# Patient Record
Sex: MALE | Race: BLACK OR AFRICAN AMERICAN | Employment: UNEMPLOYED | URBAN - METROPOLITAN AREA
[De-identification: names, ages, dates, MRNs, and addresses within clinical notes are randomized per-mention and may not be internally consistent; named-entity substitution may affect disease eponyms.]

---

## 2021-05-26 ENCOUNTER — HOSPITAL ENCOUNTER (EMERGENCY)
Age: 61
Discharge: HOME | End: 2021-05-26
Attending: EMERGENCY MEDICINE
Payer: OTHER MISCELLANEOUS

## 2021-05-26 VITALS
TEMPERATURE: 97.5 F | WEIGHT: 200 LBS | SYSTOLIC BLOOD PRESSURE: 153 MMHG | HEIGHT: 73 IN | DIASTOLIC BLOOD PRESSURE: 92 MMHG | HEART RATE: 77 BPM | RESPIRATION RATE: 16 BRPM | OXYGEN SATURATION: 98 % | BODY MASS INDEX: 26.51 KG/M2

## 2021-05-26 DIAGNOSIS — S61.411A LACERATION OF RIGHT HAND WITHOUT FOREIGN BODY, INITIAL ENCOUNTER: Primary | ICD-10-CM

## 2021-05-26 PROCEDURE — 74011250637 HC RX REV CODE- 250/637: Performed by: EMERGENCY MEDICINE

## 2021-05-26 PROCEDURE — 74011000250 HC RX REV CODE- 250: Performed by: EMERGENCY MEDICINE

## 2021-05-26 PROCEDURE — 75810000293 HC SIMP/SUPERF WND  RPR

## 2021-05-26 PROCEDURE — 99283 EMERGENCY DEPT VISIT LOW MDM: CPT

## 2021-05-26 RX ORDER — CEPHALEXIN 250 MG/1
500 CAPSULE ORAL
Status: COMPLETED | OUTPATIENT
Start: 2021-05-26 | End: 2021-05-26

## 2021-05-26 RX ORDER — LIDOCAINE HYDROCHLORIDE AND EPINEPHRINE 10; 10 MG/ML; UG/ML
10 INJECTION, SOLUTION INFILTRATION; PERINEURAL ONCE
Status: COMPLETED | OUTPATIENT
Start: 2021-05-26 | End: 2021-05-26

## 2021-05-26 RX ADMIN — LIDOCAINE HYDROCHLORIDE,EPINEPHRINE BITARTRATE 100 MG: 10; .01 INJECTION, SOLUTION INFILTRATION; PERINEURAL at 05:30

## 2021-05-26 RX ADMIN — CEPHALEXIN 500 MG: 250 CAPSULE ORAL at 06:30

## 2021-05-26 NOTE — ED TRIAGE NOTES
Pt had a cutting blade in his pocket and accidentally hit it with his right hand causing 2 inch laceration to top of hand near thumb.  No bleeding at present

## 2021-06-05 NOTE — ED PROVIDER NOTES
EMERGENCY DEPARTMENT HISTORY AND PHYSICAL EXAM    Date: 5/26/2021  Patient Name: Be Rico    History of Presenting Illness     Chief Complaint   Patient presents with    Laceration         History Provided By: Patient    Additional History (Context):   4:25 AM  Be Rico is a 57 y.o. male with PMHX of hypertension, diabetes who presents to the emergency department C/O right hand laceration. Patient had a laceration across his a.m. after accidentally cutting it with a pocket knife. The blade was clean and he was not using it to clean fish or meat or other dirty material.. He states his tetanus shots are up-to-date. Social History  Denies smoking drinking or drugs    Family History  Positive for high blood pressure and diabetes    PCP: Oni Jerome MD    Current Outpatient Medications   Medication Sig Dispense Refill    ramipril (ALTACE) 2.5 mg capsule Take 1 Cap by mouth daily. 30 Cap 3    omeprazole (PRILOSEC) 20 mg capsule Take 1 Cap by mouth daily. 30 Cap 3    Insulin Syringes, Disposable, 1 mL syrg As directed 50 Syringe 3    insulin glargine (LANTUS) 100 unit/mL injection 25 units sq qday 1 Vial 1    insulin lispro (HUMALOG) 100 unit/mL injection 8 units tid 1 Vial 3    aspirin 81 mg chewable tablet Take 81 mg by mouth daily.  amLODIPine (NORVASC) 10 mg tablet Take 10 mg by mouth daily. Indications: HYPERTENSION      metoprolol (LOPRESSOR) 50 mg tablet Take 50 mg by mouth two (2) times a day. Indications: HYPERTENSION      pravastatin (PRAVACHOL) 40 mg tablet Take 40 mg by mouth nightly. Indications: HYPERCHOLESTEROLEMIA         Past History     Past Medical History:  Past Medical History:   Diagnosis Date    Anginal pain ([facility] [address]     Diabetes ([facility] [address]     new onset 2/2014    High cholesterol        Past Surgical History:  No past surgical history on file. Family History:  No family history on file.     Social History:  Social History     Tobacco Use    Smoking status: Current Some Day Smoker   Substance Use Topics    Alcohol use: No     Comment: former drinker, quit 12/2013    Drug use: No       Allergies:  No Known Allergies      Review of Systems   Review of Systems   Constitutional: Negative. Cardiovascular: Negative. Gastrointestinal: Negative. Musculoskeletal: Negative. Skin: Positive for wound. Negative for color change. Neurological: Positive for numbness. Diabetic peripheral neuropathy. Hematological: Does not bruise/bleed easily. Psychiatric/Behavioral: Negative for agitation and self-injury. The patient is not nervous/anxious. All other systems reviewed and are negative. Physical Exam     Vitals:    05/26/21 0337   BP: (!) 153/92   Pulse: 77   Resp: 16   Temp: 97.5 °F (36.4 °C)   SpO2: 98%   Weight: 90.7 kg (200 lb)   Height: 6' 1\" (1.854 m)     Physical Exam  Vitals and nursing note reviewed. Constitutional:       General: He is not in acute distress. Appearance: He is well-developed. He is not diaphoretic. HENT:      Head: Normocephalic and atraumatic. Eyes:      General: No scleral icterus. Extraocular Movements:      Right eye: Normal extraocular motion. Left eye: Normal extraocular motion. Conjunctiva/sclera: Conjunctivae normal.      Pupils: Pupils are equal, round, and reactive to light. Neck:      Trachea: No tracheal deviation. Pulmonary:      Effort: Pulmonary effort is normal. No respiratory distress. Breath sounds: No stridor. Abdominal:      General: Bowel sounds are normal. There is no distension. Palpations: Abdomen is soft. Tenderness: There is no abdominal tenderness. There is no rebound. Musculoskeletal:         General: No tenderness. Normal range of motion. Hands:       Cervical back: Normal range of motion and neck supple. Comments: 5.5 cm laceration dorsum of the right hand. Bleeding is spontaneously controlled.   Grossly unremarkable without abnormalities   Skin:     General: Skin is warm and dry. Capillary Refill: Capillary refill takes less than 2 seconds. Findings: Laceration and wound present. No rash. Comments: 5.5 cm laceration of the dorsum of the posterior aspect of the thumb on the right hand. He has full opposition of the thumb in full extension. IPJ and MCP function are completely intact. Sensation remains intact the distal phalanges of the thumb. Neurological:      Mental Status: He is alert and oriented to person, place, and time. GCS: GCS eye subscore is 4. GCS verbal subscore is 5. GCS motor subscore is 6. Cranial Nerves: No cranial nerve deficit. Motor: No weakness. Psychiatric:         Mood and Affect: Mood normal.         Behavior: Behavior normal.         Thought Content: Thought content normal.         Judgment: Judgment normal.       Diagnostic Study Results     Labs -  No results found for this or any previous visit (from the past 24 hour(s)). Radiologic Studies -   No orders to display     CT Results  (Last 48 hours)    None        CXR Results  (Last 48 hours)    None          Medications given in the ED-  Medications   lidocaine-EPINEPHrine (XYLOCAINE) 1 %-1:100,000 injection 100 mg (100 mg IntraDERMal Given by Provider 5/26/21 0530)   cephALEXin (KEFLEX) capsule 500 mg (500 mg Oral Given 5/26/21 0630)         Medical Decision Making   I am the first provider for this patient. I reviewed the vital signs, available nursing notes, past medical history, past surgical history, family history and social history. Vital Signs-Reviewed the patient's vital signs. Pulse Oximetry Analysis - 98% on room air      Records Reviewed: NURSING NOTES AND PREVIOUS MEDICAL RECORDS    Provider Notes (Medical Decision Making):   Patient with isolated hand laceration postoperative pain. There is no evidence of foreign body by examination is clearly evident.   We discussed the possibility of x-ray but neither of us feel this is indicated. Wound was cleaned and irrigated and cleansed easily after nerve block and minimal local infiltrate. Patient was instructed in local wound care and asked to return in 10 to 14 days for suture removal.    Procedures:  Wound Repair    Date/Time: 5/26/2021 5:15 AM  Performed by: attendingPreparation: skin prepped with Betadine  Pre-procedure re-eval: Immediately prior to the procedure, the patient was reevaluated and found suitable for the planned procedure and any planned medications. Time out: Immediately prior to the procedure a time out was called to verify the correct patient, procedure, equipment, staff and marking as appropriate. .  Wound length:2.6 - 7.5 cm (5.5 cm)  Anesthesia: nerve block and local infiltration    Anesthesia:  Local Anesthetic: lidocaine 1% with epinephrine  Anesthetic total: 8 mL  Foreign bodies: no foreign bodies  Irrigation solution: saline  Irrigation method: jet lavage  Debridement: none  Skin closure: 4-0 nylon  Subcutaneous closure: Vicryl  Number of sutures: 12  Technique: simple and interrupted  Approximation: close  Dressing: 4x4, antibiotic ointment and non-adhesive packing strip  My total time at bedside, performing this procedure was 16-30 minutes.   Nerve Block    Date/Time: 5/26/2021 5:10 AM  Performed by: Jenney Rubinstein, MD  Authorized by: Jenney Rubinstein, MD     Consent:     Consent obtained:  Verbal    Consent given by:  Patient    Risks discussed:  Bleeding, allergic reaction, infection, intravenous injection, pain, nerve damage, swelling and unsuccessful block    Alternatives discussed:  Delayed treatment  Indications:     Indications:  Pain relief and procedural anesthesia  Location:     Body area:  Upper extremity    Upper extremity nerve:  Radial    Laterality:  Right  Pre-procedure details:     Skin preparation:  Hibiclens  Skin anesthesia (see MAR for exact dosages):     Skin anesthesia method:  None  Procedure details (see MAR for exact dosages): Block needle gauge:  27 G    Anesthetic injected:  Lidocaine 1% WITH epi    Steroid injected:  None    Additive injected:  None    Injection procedure:  Anatomic landmarks identified, anatomic landmarks palpated, incremental injection and negative aspiration for blood    Paresthesia:  None  Post-procedure details:     Dressing:  None    Outcome:  Anesthesia achieved    Patient tolerance of procedure: Tolerated well, no immediate complications        ED Course:   2:01 PM: Initial assessment performed. The patients presenting problems have been discussed, and they are in agreement with the care plan formulated and outlined with them. I have encouraged them to ask questions as they arise throughout their visit. Diagnosis and Disposition       DISCHARGE NOTE:  6:19 AM  Jia Yeboah's  results have been reviewed with him. He has been counseled regarding his diagnosis, treatment, and plan. He verbally conveys understanding and agreement of the signs, symptoms, diagnosis, treatment and prognosis and additionally agrees to follow up as discussed. He also agrees with the care-plan and conveys that all of his questions have been answered. I have also provided discharge instructions for him that include: educational information regarding their diagnosis and treatment, and list of reasons why they would want to return to the ED prior to their follow-up appointment, should his condition change. He has been provided with education for proper emergency department utilization. CLINICAL IMPRESSION:    1. Laceration of right hand without foreign body, initial encounter        PLAN:  1. D/C Home  2. Discharge Medication List as of 5/26/2021  6:22 AM        3.    Follow-up Information     Follow up With Specialties Details Why Contact Info    Lesa Thomas MD Family Medicine In 1 week  [address] [place] [address] EMERGENCY DEPT Emergency Medicine In 10 days For suture removal [address] [place] [address]  [phone]        _______________________________    This note was partially transcribed via voice recognition software. Although efforts have been made to catch any discrepancies, it may contain sound alike words, grammatical errors, or nonsensical words.

## 2023-01-15 ENCOUNTER — HOSPITAL ENCOUNTER (INPATIENT)
Age: 63
LOS: 6 days | Discharge: HOME | DRG: 235 | End: 2023-01-21
Attending: HOSPITALIST | Admitting: HOSPITALIST
Payer: OTHER GOVERNMENT

## 2023-01-15 ENCOUNTER — HOSPITAL ENCOUNTER (INPATIENT)
Age: 63
LOS: 1 days | Discharge: ANOTHER HOSPITAL | DRG: 282 | End: 2023-01-15
Attending: EMERGENCY MEDICINE | Admitting: FAMILY MEDICINE
Payer: OTHER GOVERNMENT

## 2023-01-15 ENCOUNTER — APPOINTMENT (OUTPATIENT)
Dept: VASCULAR SURGERY | Age: 63
DRG: 235 | End: 2023-01-15
Attending: PHYSICIAN ASSISTANT
Payer: OTHER GOVERNMENT

## 2023-01-15 ENCOUNTER — APPOINTMENT (OUTPATIENT)
Dept: GENERAL RADIOLOGY | Age: 63
DRG: 282 | End: 2023-01-15
Attending: HOSPITALIST
Payer: OTHER GOVERNMENT

## 2023-01-15 ENCOUNTER — HOSPITAL ENCOUNTER (INPATIENT)
Dept: CT IMAGING | Age: 63
Discharge: HOME | DRG: 235 | End: 2023-01-15
Attending: PHYSICIAN ASSISTANT
Payer: OTHER GOVERNMENT

## 2023-01-15 ENCOUNTER — APPOINTMENT (OUTPATIENT)
Dept: NON INVASIVE DIAGNOSTICS | Age: 63
DRG: 282 | End: 2023-01-15
Attending: HOSPITALIST
Payer: OTHER GOVERNMENT

## 2023-01-15 VITALS
HEIGHT: 73 IN | RESPIRATION RATE: 13 BRPM | WEIGHT: 195 LBS | BODY MASS INDEX: 25.84 KG/M2 | DIASTOLIC BLOOD PRESSURE: 88 MMHG | SYSTOLIC BLOOD PRESSURE: 162 MMHG | OXYGEN SATURATION: 100 % | HEART RATE: 80 BPM | TEMPERATURE: 98 F

## 2023-01-15 DIAGNOSIS — I21.3 STEMI (ST ELEVATION MYOCARDIAL INFARCTION) (HCC): ICD-10-CM

## 2023-01-15 DIAGNOSIS — I21.11 ST ELEVATION MYOCARDIAL INFARCTION INVOLVING RIGHT CORONARY ARTERY (HCC): Primary | ICD-10-CM

## 2023-01-15 DIAGNOSIS — I25.110 CORONARY ARTERY DISEASE INVOLVING NATIVE CORONARY ARTERY OF NATIVE HEART WITH UNSTABLE ANGINA PECTORIS (HCC): ICD-10-CM

## 2023-01-15 DIAGNOSIS — Z95.1 S/P CABG (CORONARY ARTERY BYPASS GRAFT): ICD-10-CM

## 2023-01-15 DIAGNOSIS — I21.3 ST ELEVATION MYOCARDIAL INFARCTION (STEMI), UNSPECIFIED ARTERY (HCC): Primary | ICD-10-CM

## 2023-01-15 LAB
ACT BLD: 474 SECS (ref 79–138)
ALBUMIN SERPL-MCNC: 4 G/DL (ref 3.4–5)
ALBUMIN/GLOB SERPL: 1.1 (ref 0.8–1.7)
ALP SERPL-CCNC: 109 U/L (ref 45–117)
ALT SERPL-CCNC: 129 U/L (ref 16–61)
AMMONIA PLAS-SCNC: 41 UMOL/L (ref 11–32)
ANION GAP SERPL CALC-SCNC: 14 MMOL/L (ref 3–18)
APTT PPP: 26.3 SEC (ref 23–36.4)
APTT PPP: 43 SEC (ref 23–36.4)
APTT PPP: 56.1 SEC (ref 23–36.4)
AST SERPL-CCNC: 74 U/L (ref 10–38)
ATRIAL RATE: 55 BPM
BASE EXCESS BLD CALC-SCNC: 1.2 MMOL/L
BASOPHILS # BLD: 0 K/UL (ref 0–0.1)
BASOPHILS # BLD: 0 K/UL (ref 0–0.1)
BASOPHILS NFR BLD: 0 % (ref 0–2)
BASOPHILS NFR BLD: 1 % (ref 0–2)
BDY SITE: ABNORMAL
BILIRUB SERPL-MCNC: 0.3 MG/DL (ref 0.2–1)
BODY TEMPERATURE: 98
BUN SERPL-MCNC: 4 MG/DL (ref 7–18)
BUN/CREAT SERPL: 6 (ref 12–20)
CALCIUM SERPL-MCNC: 10 MG/DL (ref 8.5–10.1)
CALCULATED P AXIS, ECG09: 34 DEGREES
CALCULATED R AXIS, ECG10: 43 DEGREES
CALCULATED T AXIS, ECG11: 83 DEGREES
CHLORIDE SERPL-SCNC: 92 MMOL/L (ref 100–111)
CO2 SERPL-SCNC: 23 MMOL/L (ref 21–32)
COVID-19 RAPID TEST, COVR: NOT DETECTED
CREAT SERPL-MCNC: 0.72 MG/DL (ref 0.6–1.3)
DIAGNOSIS, 93000: NORMAL
DIFFERENTIAL METHOD BLD: ABNORMAL
DIFFERENTIAL METHOD BLD: ABNORMAL
EOSINOPHIL # BLD: 0 K/UL (ref 0–0.4)
EOSINOPHIL # BLD: 0.3 K/UL (ref 0–0.4)
EOSINOPHIL NFR BLD: 1 % (ref 0–5)
EOSINOPHIL NFR BLD: 3 % (ref 0–5)
ERYTHROCYTE [DISTWIDTH] IN BLOOD BY AUTOMATED COUNT: 12.3 % (ref 11.6–14.5)
ERYTHROCYTE [DISTWIDTH] IN BLOOD BY AUTOMATED COUNT: 12.4 % (ref 11.6–14.5)
EST. AVERAGE GLUCOSE BLD GHB EST-MCNC: 114 MG/DL
GAS FLOW.O2 O2 DELIVERY SYS: ABNORMAL
GLOBULIN SER CALC-MCNC: 3.8 G/DL (ref 2–4)
GLUCOSE BLD STRIP.AUTO-MCNC: 111 MG/DL (ref 70–110)
GLUCOSE BLD STRIP.AUTO-MCNC: 126 MG/DL (ref 70–110)
GLUCOSE SERPL-MCNC: 128 MG/DL (ref 74–99)
HBA1C MFR BLD: 5.6 % (ref 4.2–5.6)
HCO3 BLD-SCNC: 24.8 MMOL/L (ref 22–26)
HCT VFR BLD AUTO: 43.6 % (ref 36–48)
HCT VFR BLD AUTO: 44.4 % (ref 36–48)
HGB BLD-MCNC: 15.9 G/DL (ref 13–16)
HGB BLD-MCNC: 16.3 G/DL (ref 13–16)
HISTORY CHECKED?,CKHIST: NORMAL
IMM GRANULOCYTES # BLD AUTO: 0 K/UL (ref 0–0.04)
IMM GRANULOCYTES # BLD AUTO: 0 K/UL (ref 0–0.04)
IMM GRANULOCYTES NFR BLD AUTO: 0 % (ref 0–0.5)
IMM GRANULOCYTES NFR BLD AUTO: 1 % (ref 0–0.5)
INR PPP: 0.9 (ref 0.8–1.2)
LEFT BULB EDV: 11.9 CM/S
LEFT BULB PSV: 52 CM/S
LEFT CCA DIST DIAS: 11 CM/S
LEFT CCA DIST SYS: 62.5 CM/S
LEFT CCA MID DIAS: 15.57 CM/S
LEFT CCA MID SYS: 90.47 CM/S
LEFT CCA PROX DIAS: 19.5 CM/S
LEFT CCA PROX SYS: 124 CM/S
LEFT ECA DIAS: 10.9 CM/S
LEFT ECA SYS: 73.6 CM/S
LEFT ICA DIST DIAS: 20.2 CM/S
LEFT ICA DIST SYS: 69.5 CM/S
LEFT ICA MID DIAS: 21.2 CM/S
LEFT ICA MID SYS: 64.4 CM/S
LEFT ICA PROX DIAS: 20.2 CM/S
LEFT ICA PROX SYS: 76.7 CM/S
LEFT ICA/CCA SYS: 1.23 NO UNITS
LEFT VERTEBRAL DIAS: 15.01 CM/S
LEFT VERTEBRAL SYS: 82.3 CM/S
LYMPHOCYTES # BLD: 1.4 K/UL (ref 0.9–3.6)
LYMPHOCYTES # BLD: 4.3 K/UL (ref 0.9–3.6)
LYMPHOCYTES NFR BLD: 17 % (ref 21–52)
LYMPHOCYTES NFR BLD: 51 % (ref 21–52)
MCH RBC QN AUTO: 32.6 PG (ref 24–34)
MCH RBC QN AUTO: 32.7 PG (ref 24–34)
MCHC RBC AUTO-ENTMCNC: 36.5 G/DL (ref 31–37)
MCHC RBC AUTO-ENTMCNC: 36.7 G/DL (ref 31–37)
MCV RBC AUTO: 89.2 FL (ref 78–100)
MCV RBC AUTO: 89.5 FL (ref 78–100)
MONOCYTES # BLD: 0.5 K/UL (ref 0.05–1.2)
MONOCYTES # BLD: 0.9 K/UL (ref 0.05–1.2)
MONOCYTES NFR BLD: 10 % (ref 3–10)
MONOCYTES NFR BLD: 6 % (ref 3–10)
NEUTS SEG # BLD: 2.9 K/UL (ref 1.8–8)
NEUTS SEG # BLD: 6.3 K/UL (ref 1.8–8)
NEUTS SEG NFR BLD: 35 % (ref 40–73)
NEUTS SEG NFR BLD: 76 % (ref 40–73)
NRBC # BLD: 0 K/UL (ref 0–0.01)
NRBC # BLD: 0 K/UL (ref 0–0.01)
NRBC BLD-RTO: 0 PER 100 WBC
NRBC BLD-RTO: 0 PER 100 WBC
P-R INTERVAL, ECG05: 242 MS
PCO2 BLD: 35 MMHG (ref 35–45)
PH BLD: 7.46 (ref 7.35–7.45)
PLATELET # BLD AUTO: 244 K/UL (ref 135–420)
PLATELET # BLD AUTO: 254 K/UL (ref 135–420)
PMV BLD AUTO: 8.4 FL (ref 9.2–11.8)
PMV BLD AUTO: 8.6 FL (ref 9.2–11.8)
PO2 BLD: 81 MMHG (ref 80–100)
POTASSIUM SERPL-SCNC: 3.8 MMOL/L (ref 3.5–5.5)
PROT SERPL-MCNC: 7.8 G/DL (ref 6.4–8.2)
PROTHROMBIN TIME: 12.6 SEC (ref 11.5–15.2)
Q-T INTERVAL, ECG07: 376 MS
QRS DURATION, ECG06: 90 MS
QTC CALCULATION (BEZET), ECG08: 359 MS
RBC # BLD AUTO: 4.87 M/UL (ref 4.35–5.65)
RBC # BLD AUTO: 4.98 M/UL (ref 4.35–5.65)
RIGHT BULB EDV: 7.6 CM/S
RIGHT BULB PSV: 51.6 CM/S
RIGHT CCA DIST DIAS: 8.9 CM/S
RIGHT CCA DIST SYS: 62 CM/S
RIGHT CCA MID DIAS: 13.6 CM/S
RIGHT CCA MID SYS: 98.35 CM/S
RIGHT CCA PROX DIAS: 9.7 CM/S
RIGHT CCA PROX SYS: 122 CM/S
RIGHT ECA DIAS: 8.93 CM/S
RIGHT ECA SYS: 95.6 CM/S
RIGHT ICA DIST DIAS: 24.8 CM/S
RIGHT ICA DIST SYS: 88.6 CM/S
RIGHT ICA MID DIAS: 23.7 CM/S
RIGHT ICA MID SYS: 69.9 CM/S
RIGHT ICA PROX DIAS: 14.7 CM/S
RIGHT ICA PROX SYS: 52.3 CM/S
RIGHT ICA/CCA SYS: 1.4 NO UNITS
RIGHT VERTEBRAL DIAS: 9.45 CM/S
RIGHT VERTEBRAL SYS: 50.1 CM/S
SAO2 % BLD: 96.7 % (ref 92–97)
SERVICE CMNT-IMP: ABNORMAL
SODIUM SERPL-SCNC: 129 MMOL/L (ref 136–145)
SOURCE, COVRS: NORMAL
SPECIMEN TYPE: ABNORMAL
TOTAL RESP. RATE, ITRR: 15
TROPONIN-HIGH SENSITIVITY: 24 NG/L (ref 0–78)
TROPONIN-HIGH SENSITIVITY: 3096 NG/L (ref 0–78)
VAS LEFT SUBCLAVIAN PROX EDV: 11.7 CM/S
VAS LEFT SUBCLAVIAN PROX PSV: 137.1 CM/S
VAS RIGHT SUBCLAVIAN PROX EDV: 0 CM/S
VAS RIGHT SUBCLAVIAN PROX PSV: 121.2 CM/S
VENTRICULAR RATE, ECG03: 55 BPM
WBC # BLD AUTO: 8.3 K/UL (ref 4.6–13.2)
WBC # BLD AUTO: 8.4 K/UL (ref 4.6–13.2)

## 2023-01-15 PROCEDURE — C9113 INJ PANTOPRAZOLE SODIUM, VIA: HCPCS | Performed by: INTERNAL MEDICINE

## 2023-01-15 PROCEDURE — 85730 THROMBOPLASTIN TIME PARTIAL: CPT

## 2023-01-15 PROCEDURE — 80307 DRUG TEST PRSMV CHEM ANLYZR: CPT

## 2023-01-15 PROCEDURE — 74011000258 HC RX REV CODE- 258: Performed by: INTERNAL MEDICINE

## 2023-01-15 PROCEDURE — 87635 SARS-COV-2 COVID-19 AMP PRB: CPT

## 2023-01-15 PROCEDURE — C1769 GUIDE WIRE: HCPCS | Performed by: INTERNAL MEDICINE

## 2023-01-15 PROCEDURE — 84484 ASSAY OF TROPONIN QUANT: CPT

## 2023-01-15 PROCEDURE — 77010033678 HC OXYGEN DAILY

## 2023-01-15 PROCEDURE — 94762 N-INVAS EAR/PLS OXIMTRY CONT: CPT

## 2023-01-15 PROCEDURE — 96375 TX/PRO/DX INJ NEW DRUG ADDON: CPT

## 2023-01-15 PROCEDURE — C1725 CATH, TRANSLUMIN NON-LASER: HCPCS | Performed by: INTERNAL MEDICINE

## 2023-01-15 PROCEDURE — 74011000250 HC RX REV CODE- 250: Performed by: HOSPITALIST

## 2023-01-15 PROCEDURE — 86900 BLOOD TYPING SEROLOGIC ABO: CPT

## 2023-01-15 PROCEDURE — 65620000000 HC RM CCU GENERAL

## 2023-01-15 PROCEDURE — 36415 COLL VENOUS BLD VENIPUNCTURE: CPT

## 2023-01-15 PROCEDURE — 36600 WITHDRAWAL OF ARTERIAL BLOOD: CPT

## 2023-01-15 PROCEDURE — 74011000636 HC RX REV CODE- 636: Performed by: INTERNAL MEDICINE

## 2023-01-15 PROCEDURE — 93880 EXTRACRANIAL BILAT STUDY: CPT

## 2023-01-15 PROCEDURE — 96374 THER/PROPH/DIAG INJ IV PUSH: CPT

## 2023-01-15 PROCEDURE — 65610000006 HC RM INTENSIVE CARE

## 2023-01-15 PROCEDURE — B2111ZZ FLUOROSCOPY OF MULTIPLE CORONARY ARTERIES USING LOW OSMOLAR CONTRAST: ICD-10-PCS | Performed by: INTERNAL MEDICINE

## 2023-01-15 PROCEDURE — 85025 COMPLETE CBC W/AUTO DIFF WBC: CPT

## 2023-01-15 PROCEDURE — 80053 COMPREHEN METABOLIC PANEL: CPT

## 2023-01-15 PROCEDURE — 77030010221 HC SPLNT WR POS TELE -B: Performed by: INTERNAL MEDICINE

## 2023-01-15 PROCEDURE — 83036 HEMOGLOBIN GLYCOSYLATED A1C: CPT

## 2023-01-15 PROCEDURE — 77030013797 HC KT TRNSDUC PRSSR EDWD -A: Performed by: INTERNAL MEDICINE

## 2023-01-15 PROCEDURE — 77030004521 HC CATH ANGI DX COOK -B: Performed by: INTERNAL MEDICINE

## 2023-01-15 PROCEDURE — 99152 MOD SED SAME PHYS/QHP 5/>YRS: CPT | Performed by: INTERNAL MEDICINE

## 2023-01-15 PROCEDURE — 74011250637 HC RX REV CODE- 250/637: Performed by: EMERGENCY MEDICINE

## 2023-01-15 PROCEDURE — 74011250636 HC RX REV CODE- 250/636: Performed by: THORACIC SURGERY (CARDIOTHORACIC VASCULAR SURGERY)

## 2023-01-15 PROCEDURE — 71045 X-RAY EXAM CHEST 1 VIEW: CPT

## 2023-01-15 PROCEDURE — 4A023N7 MEASUREMENT OF CARDIAC SAMPLING AND PRESSURE, LEFT HEART, PERCUTANEOUS APPROACH: ICD-10-PCS | Performed by: INTERNAL MEDICINE

## 2023-01-15 PROCEDURE — 74011250636 HC RX REV CODE- 250/636: Performed by: HOSPITALIST

## 2023-01-15 PROCEDURE — 74011000250 HC RX REV CODE- 250: Performed by: PHYSICIAN ASSISTANT

## 2023-01-15 PROCEDURE — 74011250636 HC RX REV CODE- 250/636: Performed by: PHYSICIAN ASSISTANT

## 2023-01-15 PROCEDURE — 74011250636 HC RX REV CODE- 250/636: Performed by: EMERGENCY MEDICINE

## 2023-01-15 PROCEDURE — 82140 ASSAY OF AMMONIA: CPT

## 2023-01-15 PROCEDURE — 93458 L HRT ARTERY/VENTRICLE ANGIO: CPT | Performed by: INTERNAL MEDICINE

## 2023-01-15 PROCEDURE — 77030004522 HC CATH ANGI DX EXPO BSC -A: Performed by: INTERNAL MEDICINE

## 2023-01-15 PROCEDURE — 86923 COMPATIBILITY TEST ELECTRIC: CPT

## 2023-01-15 PROCEDURE — C1887 CATHETER, GUIDING: HCPCS | Performed by: INTERNAL MEDICINE

## 2023-01-15 PROCEDURE — C9113 INJ PANTOPRAZOLE SODIUM, VIA: HCPCS | Performed by: HOSPITALIST

## 2023-01-15 PROCEDURE — 74176 CT ABD & PELVIS W/O CONTRAST: CPT

## 2023-01-15 PROCEDURE — 82962 GLUCOSE BLOOD TEST: CPT

## 2023-01-15 PROCEDURE — 74011000250 HC RX REV CODE- 250: Performed by: INTERNAL MEDICINE

## 2023-01-15 PROCEDURE — 74011250637 HC RX REV CODE- 250/637: Performed by: PHYSICIAN ASSISTANT

## 2023-01-15 PROCEDURE — 99223 1ST HOSP IP/OBS HIGH 75: CPT | Performed by: THORACIC SURGERY (CARDIOTHORACIC VASCULAR SURGERY)

## 2023-01-15 PROCEDURE — 77030008543 HC TBNG MON PRSS MRTM -A: Performed by: INTERNAL MEDICINE

## 2023-01-15 PROCEDURE — 80346 BENZODIAZEPINES1-12: CPT

## 2023-01-15 PROCEDURE — 74011250636 HC RX REV CODE- 250/636: Performed by: INTERNAL MEDICINE

## 2023-01-15 PROCEDURE — 85347 COAGULATION TIME ACTIVATED: CPT

## 2023-01-15 PROCEDURE — 82542 COL CHROMOTOGRAPHY QUAL/QUAN: CPT

## 2023-01-15 PROCEDURE — 93005 ELECTROCARDIOGRAM TRACING: CPT

## 2023-01-15 PROCEDURE — 82803 BLOOD GASES ANY COMBINATION: CPT

## 2023-01-15 PROCEDURE — 92920 PRQ TRLUML C ANGIOP 1ART&/BR: CPT | Performed by: INTERNAL MEDICINE

## 2023-01-15 PROCEDURE — C1894 INTRO/SHEATH, NON-LASER: HCPCS | Performed by: INTERNAL MEDICINE

## 2023-01-15 PROCEDURE — 99285 EMERGENCY DEPT VISIT HI MDM: CPT

## 2023-01-15 PROCEDURE — 77030016699 HC CATH ANGI DX INFN1 CARD -A: Performed by: INTERNAL MEDICINE

## 2023-01-15 PROCEDURE — 85610 PROTHROMBIN TIME: CPT

## 2023-01-15 PROCEDURE — 99223 1ST HOSP IP/OBS HIGH 75: CPT | Performed by: HOSPITALIST

## 2023-01-15 PROCEDURE — 93571 IV DOP VEL&/PRESS C FLO 1ST: CPT | Performed by: INTERNAL MEDICINE

## 2023-01-15 PROCEDURE — 99153 MOD SED SAME PHYS/QHP EA: CPT | Performed by: INTERNAL MEDICINE

## 2023-01-15 RX ORDER — HEPARIN SODIUM 1000 [USP'U]/ML
3000 INJECTION, SOLUTION INTRAVENOUS; SUBCUTANEOUS ONCE
Status: COMPLETED | OUTPATIENT
Start: 2023-01-15 | End: 2023-01-15

## 2023-01-15 RX ORDER — HEPARIN SODIUM 1000 [USP'U]/ML
4000 INJECTION, SOLUTION INTRAVENOUS; SUBCUTANEOUS ONCE
Status: COMPLETED | OUTPATIENT
Start: 2023-01-15 | End: 2023-01-15

## 2023-01-15 RX ORDER — MORPHINE SULFATE 2 MG/ML
2-4 INJECTION, SOLUTION INTRAMUSCULAR; INTRAVENOUS
Status: DISCONTINUED | OUTPATIENT
Start: 2023-01-15 | End: 2023-01-16

## 2023-01-15 RX ORDER — ONDANSETRON 2 MG/ML
4 INJECTION INTRAMUSCULAR; INTRAVENOUS
Status: DISCONTINUED | OUTPATIENT
Start: 2023-01-15 | End: 2023-01-15 | Stop reason: HOSPADM

## 2023-01-15 RX ORDER — FOLIC ACID 1 MG/1
1 TABLET ORAL DAILY
Status: DISCONTINUED | OUTPATIENT
Start: 2023-01-16 | End: 2023-01-21 | Stop reason: HOSPADM

## 2023-01-15 RX ORDER — GUAIFENESIN 100 MG/5ML
325 LIQUID (ML) ORAL
Status: COMPLETED | OUTPATIENT
Start: 2023-01-15 | End: 2023-01-15

## 2023-01-15 RX ORDER — ONDANSETRON 4 MG/1
4 TABLET, ORALLY DISINTEGRATING ORAL
Status: DISCONTINUED | OUTPATIENT
Start: 2023-01-15 | End: 2023-01-15 | Stop reason: HOSPADM

## 2023-01-15 RX ORDER — LORAZEPAM 2 MG/ML
2 INJECTION INTRAMUSCULAR
Status: DISCONTINUED | OUTPATIENT
Start: 2023-01-15 | End: 2023-01-16

## 2023-01-15 RX ORDER — PHENYLEPHRINE HCL IN 0.9% NACL 1 MG/10 ML
SYRINGE (ML) INTRAVENOUS AS NEEDED
Status: COMPLETED | OUTPATIENT
Start: 2023-01-15 | End: 2023-01-15

## 2023-01-15 RX ORDER — ACETAMINOPHEN 325 MG/1
650 TABLET ORAL
Status: DISCONTINUED | OUTPATIENT
Start: 2023-01-15 | End: 2023-01-15 | Stop reason: HOSPADM

## 2023-01-15 RX ORDER — LORAZEPAM 1 MG/1
1 TABLET ORAL
Status: DISCONTINUED | OUTPATIENT
Start: 2023-01-15 | End: 2023-01-16

## 2023-01-15 RX ORDER — EPTIFIBATIDE 2 MG/ML
INJECTION, SOLUTION INTRAVENOUS AS NEEDED
Status: DISCONTINUED | OUTPATIENT
Start: 2023-01-15 | End: 2023-01-15 | Stop reason: HOSPADM

## 2023-01-15 RX ORDER — ATORVASTATIN CALCIUM 80 MG/1
80 TABLET, FILM COATED ORAL DAILY
Qty: 30 TABLET | Refills: 0 | Status: ON HOLD
Start: 2023-01-15 | End: 2023-01-20 | Stop reason: SDUPTHER

## 2023-01-15 RX ORDER — NITROGLYCERIN 0.4 MG/1
0.4 TABLET SUBLINGUAL
Status: DISCONTINUED | OUTPATIENT
Start: 2023-01-15 | End: 2023-01-15 | Stop reason: HOSPADM

## 2023-01-15 RX ORDER — HYDROCODONE BITARTRATE AND ACETAMINOPHEN 5; 325 MG/1; MG/1
1-2 TABLET ORAL
Status: DISCONTINUED | OUTPATIENT
Start: 2023-01-15 | End: 2023-01-16

## 2023-01-15 RX ORDER — SODIUM CHLORIDE 0.9 % (FLUSH) 0.9 %
5-40 SYRINGE (ML) INJECTION AS NEEDED
Status: DISCONTINUED | OUTPATIENT
Start: 2023-01-15 | End: 2023-01-16

## 2023-01-15 RX ORDER — INSULIN LISPRO 100 [IU]/ML
INJECTION, SOLUTION INTRAVENOUS; SUBCUTANEOUS
Status: DISCONTINUED | OUTPATIENT
Start: 2023-01-15 | End: 2023-01-15

## 2023-01-15 RX ORDER — LIDOCAINE HYDROCHLORIDE 10 MG/ML
INJECTION INFILTRATION; PERINEURAL AS NEEDED
Status: COMPLETED | OUTPATIENT
Start: 2023-01-15 | End: 2023-01-15

## 2023-01-15 RX ORDER — IBUPROFEN 200 MG
4 TABLET ORAL AS NEEDED
Status: DISCONTINUED | OUTPATIENT
Start: 2023-01-15 | End: 2023-01-15 | Stop reason: HOSPADM

## 2023-01-15 RX ORDER — ACETAMINOPHEN 325 MG/1
650 TABLET ORAL
Status: DISCONTINUED | OUTPATIENT
Start: 2023-01-15 | End: 2023-01-16

## 2023-01-15 RX ORDER — INSULIN LISPRO 100 [IU]/ML
INJECTION, SOLUTION INTRAVENOUS; SUBCUTANEOUS
Status: DISCONTINUED | OUTPATIENT
Start: 2023-01-15 | End: 2023-01-16

## 2023-01-15 RX ORDER — ATORVASTATIN CALCIUM 40 MG/1
80 TABLET, FILM COATED ORAL DAILY
Status: DISCONTINUED | OUTPATIENT
Start: 2023-01-16 | End: 2023-01-21 | Stop reason: HOSPADM

## 2023-01-15 RX ORDER — FENTANYL CITRATE 50 UG/ML
INJECTION, SOLUTION INTRAMUSCULAR; INTRAVENOUS AS NEEDED
Status: COMPLETED | OUTPATIENT
Start: 2023-01-15 | End: 2023-01-15

## 2023-01-15 RX ORDER — HEPARIN SODIUM 5000 [USP'U]/ML
5000 INJECTION, SOLUTION INTRAVENOUS; SUBCUTANEOUS EVERY 8 HOURS
Status: DISCONTINUED | OUTPATIENT
Start: 2023-01-15 | End: 2023-01-15

## 2023-01-15 RX ORDER — ACETAMINOPHEN 650 MG/1
650 SUPPOSITORY RECTAL
Status: DISCONTINUED | OUTPATIENT
Start: 2023-01-15 | End: 2023-01-15 | Stop reason: HOSPADM

## 2023-01-15 RX ORDER — IBUPROFEN 200 MG
4 TABLET ORAL AS NEEDED
Status: DISCONTINUED | OUTPATIENT
Start: 2023-01-15 | End: 2023-01-16

## 2023-01-15 RX ORDER — ONDANSETRON 2 MG/ML
INJECTION INTRAMUSCULAR; INTRAVENOUS
Status: DISCONTINUED
Start: 2023-01-15 | End: 2023-01-15 | Stop reason: HOSPADM

## 2023-01-15 RX ORDER — ATORVASTATIN CALCIUM 20 MG/1
80 TABLET, FILM COATED ORAL DAILY
Status: DISCONTINUED | OUTPATIENT
Start: 2023-01-15 | End: 2023-01-15 | Stop reason: HOSPADM

## 2023-01-15 RX ORDER — HEPARIN SODIUM 10000 [USP'U]/100ML
11-25 INJECTION, SOLUTION INTRAVENOUS
Qty: 100 ML | Refills: 0 | Status: ON HOLD | OUTPATIENT
Start: 2023-01-15 | End: 2023-01-20

## 2023-01-15 RX ORDER — METOPROLOL TARTRATE 25 MG/1
12.5 TABLET, FILM COATED ORAL ONCE
Status: COMPLETED | OUTPATIENT
Start: 2023-01-16 | End: 2023-01-16

## 2023-01-15 RX ORDER — LANOLIN ALCOHOL/MO/W.PET/CERES
100 CREAM (GRAM) TOPICAL DAILY
Status: DISCONTINUED | OUTPATIENT
Start: 2023-01-16 | End: 2023-01-21 | Stop reason: HOSPADM

## 2023-01-15 RX ORDER — METOPROLOL TARTRATE 25 MG/1
12.5 TABLET, FILM COATED ORAL 2 TIMES DAILY
Status: DISCONTINUED | OUTPATIENT
Start: 2023-01-16 | End: 2023-01-18

## 2023-01-15 RX ORDER — NITROGLYCERIN 0.4 MG/1
0.4 TABLET SUBLINGUAL
Qty: 3 EACH | Refills: 0 | Status: SHIPPED
Start: 2023-01-15 | End: 2023-01-21 | Stop reason: ALTCHOICE

## 2023-01-15 RX ORDER — SODIUM CHLORIDE 0.9 % (FLUSH) 0.9 %
5-40 SYRINGE (ML) INJECTION AS NEEDED
Status: DISCONTINUED | OUTPATIENT
Start: 2023-01-15 | End: 2023-01-15 | Stop reason: HOSPADM

## 2023-01-15 RX ORDER — DEXTROSE MONOHYDRATE 100 MG/ML
0-250 INJECTION, SOLUTION INTRAVENOUS AS NEEDED
Status: DISCONTINUED | OUTPATIENT
Start: 2023-01-15 | End: 2023-01-15 | Stop reason: HOSPADM

## 2023-01-15 RX ORDER — METOPROLOL TARTRATE 5 MG/5ML
1.25 INJECTION INTRAVENOUS ONCE
Status: DISCONTINUED | OUTPATIENT
Start: 2023-01-16 | End: 2023-01-16

## 2023-01-15 RX ORDER — PHENYLEPHRINE HYDROCHLORIDE 10 MG/ML
INJECTION INTRAVENOUS AS NEEDED
Status: COMPLETED | OUTPATIENT
Start: 2023-01-15 | End: 2023-01-15

## 2023-01-15 RX ORDER — SODIUM CHLORIDE 0.9 % (FLUSH) 0.9 %
5-40 SYRINGE (ML) INJECTION EVERY 8 HOURS
Status: DISCONTINUED | OUTPATIENT
Start: 2023-01-15 | End: 2023-01-16

## 2023-01-15 RX ORDER — HEPARIN SODIUM 200 [USP'U]/100ML
INJECTION, SOLUTION INTRAVENOUS
Status: COMPLETED | OUTPATIENT
Start: 2023-01-15 | End: 2023-01-15

## 2023-01-15 RX ORDER — SODIUM CHLORIDE 9 MG/ML
75 INJECTION, SOLUTION INTRAVENOUS CONTINUOUS
Status: DISCONTINUED | OUTPATIENT
Start: 2023-01-16 | End: 2023-01-16

## 2023-01-15 RX ORDER — LORAZEPAM 2 MG/ML
1 INJECTION INTRAMUSCULAR
Status: DISCONTINUED | OUTPATIENT
Start: 2023-01-15 | End: 2023-01-16

## 2023-01-15 RX ORDER — INSULIN LISPRO 100 [IU]/ML
INJECTION, SOLUTION INTRAVENOUS; SUBCUTANEOUS EVERY 6 HOURS
Status: DISCONTINUED | OUTPATIENT
Start: 2023-01-15 | End: 2023-01-15 | Stop reason: HOSPADM

## 2023-01-15 RX ORDER — MUPIROCIN 20 MG/G
OINTMENT TOPICAL 2 TIMES DAILY
Status: DISCONTINUED | OUTPATIENT
Start: 2023-01-15 | End: 2023-01-16 | Stop reason: SDUPTHER

## 2023-01-15 RX ORDER — EPTIFIBATIDE 0.75 MG/ML
2 INJECTION, SOLUTION INTRAVENOUS CONTINUOUS
Status: DISCONTINUED | OUTPATIENT
Start: 2023-01-15 | End: 2023-01-15 | Stop reason: HOSPADM

## 2023-01-15 RX ORDER — EPTIFIBATIDE 0.75 MG/ML
INJECTION, SOLUTION INTRAVENOUS
Status: COMPLETED | OUTPATIENT
Start: 2023-01-15 | End: 2023-01-15

## 2023-01-15 RX ORDER — POLYETHYLENE GLYCOL 3350 17 G/17G
17 POWDER, FOR SOLUTION ORAL DAILY PRN
Status: DISCONTINUED | OUTPATIENT
Start: 2023-01-15 | End: 2023-01-15 | Stop reason: HOSPADM

## 2023-01-15 RX ORDER — HEPARIN SODIUM 10000 [USP'U]/100ML
11-25 INJECTION, SOLUTION INTRAVENOUS
Status: DISCONTINUED | OUTPATIENT
Start: 2023-01-15 | End: 2023-01-15 | Stop reason: HOSPADM

## 2023-01-15 RX ORDER — LORAZEPAM 1 MG/1
2 TABLET ORAL
Status: DISCONTINUED | OUTPATIENT
Start: 2023-01-15 | End: 2023-01-16

## 2023-01-15 RX ORDER — HEPARIN SODIUM 10000 [USP'U]/100ML
11-25 INJECTION, SOLUTION INTRAVENOUS
Status: DISPENSED | OUTPATIENT
Start: 2023-01-15 | End: 2023-01-16

## 2023-01-15 RX ORDER — DOCUSATE SODIUM 100 MG/1
100 CAPSULE, LIQUID FILLED ORAL 2 TIMES DAILY
Status: DISCONTINUED | OUTPATIENT
Start: 2023-01-15 | End: 2023-01-16

## 2023-01-15 RX ORDER — INSULIN LISPRO 100 [IU]/ML
INJECTION, SOLUTION INTRAVENOUS; SUBCUTANEOUS
Qty: 1 EACH | Refills: 0 | Status: ON HOLD
Start: 2023-01-15 | End: 2023-01-21

## 2023-01-15 RX ORDER — ENOXAPARIN SODIUM 100 MG/ML
40 INJECTION SUBCUTANEOUS DAILY
Status: DISCONTINUED | OUTPATIENT
Start: 2023-01-16 | End: 2023-01-15

## 2023-01-15 RX ORDER — GUAIFENESIN 100 MG/5ML
81 LIQUID (ML) ORAL DAILY
Status: DISCONTINUED | OUTPATIENT
Start: 2023-01-16 | End: 2023-01-15 | Stop reason: HOSPADM

## 2023-01-15 RX ORDER — ONDANSETRON 2 MG/ML
4 INJECTION INTRAMUSCULAR; INTRAVENOUS
Status: COMPLETED | OUTPATIENT
Start: 2023-01-15 | End: 2023-01-15

## 2023-01-15 RX ORDER — SODIUM CHLORIDE 0.9 % (FLUSH) 0.9 %
5-40 SYRINGE (ML) INJECTION EVERY 8 HOURS
Status: DISCONTINUED | OUTPATIENT
Start: 2023-01-15 | End: 2023-01-15 | Stop reason: HOSPADM

## 2023-01-15 RX ORDER — THERA TABS 400 MCG
1 TAB ORAL DAILY
Status: DISCONTINUED | OUTPATIENT
Start: 2023-01-16 | End: 2023-01-21 | Stop reason: HOSPADM

## 2023-01-15 RX ORDER — MIDAZOLAM HYDROCHLORIDE 1 MG/ML
INJECTION, SOLUTION INTRAMUSCULAR; INTRAVENOUS AS NEEDED
Status: COMPLETED | OUTPATIENT
Start: 2023-01-15 | End: 2023-01-15

## 2023-01-15 RX ORDER — LORAZEPAM 2 MG/ML
3 INJECTION INTRAMUSCULAR
Status: DISCONTINUED | OUTPATIENT
Start: 2023-01-15 | End: 2023-01-16

## 2023-01-15 RX ORDER — NOREPINEPHRINE BITARTRATE/D5W 8 MG/250ML
PLASTIC BAG, INJECTION (ML) INTRAVENOUS
Status: COMPLETED | OUTPATIENT
Start: 2023-01-15 | End: 2023-01-15

## 2023-01-15 RX ORDER — POLYETHYLENE GLYCOL 3350 17 G/17G
17 POWDER, FOR SOLUTION ORAL DAILY PRN
Status: DISCONTINUED | OUTPATIENT
Start: 2023-01-15 | End: 2023-01-16

## 2023-01-15 RX ORDER — DEXTROSE MONOHYDRATE 100 MG/ML
0-250 INJECTION, SOLUTION INTRAVENOUS AS NEEDED
Status: DISCONTINUED | OUTPATIENT
Start: 2023-01-15 | End: 2023-01-16

## 2023-01-15 RX ORDER — ONDANSETRON 2 MG/ML
4 INJECTION INTRAMUSCULAR; INTRAVENOUS
Status: DISCONTINUED | OUTPATIENT
Start: 2023-01-15 | End: 2023-01-16

## 2023-01-15 RX ORDER — FENTANYL CITRATE 50 UG/ML
50 INJECTION, SOLUTION INTRAMUSCULAR; INTRAVENOUS
Status: COMPLETED | OUTPATIENT
Start: 2023-01-15 | End: 2023-01-15

## 2023-01-15 RX ORDER — MORPHINE SULFATE 2 MG/ML
1 INJECTION, SOLUTION INTRAMUSCULAR; INTRAVENOUS
Status: DISCONTINUED | OUTPATIENT
Start: 2023-01-15 | End: 2023-01-15 | Stop reason: HOSPADM

## 2023-01-15 RX ORDER — POLYETHYLENE GLYCOL 3350 17 G/17G
17 POWDER, FOR SOLUTION ORAL DAILY
Status: DISCONTINUED | OUTPATIENT
Start: 2023-01-15 | End: 2023-01-16

## 2023-01-15 RX ORDER — ACETAMINOPHEN 650 MG/1
650 SUPPOSITORY RECTAL
Status: DISCONTINUED | OUTPATIENT
Start: 2023-01-15 | End: 2023-01-16

## 2023-01-15 RX ORDER — ONDANSETRON 4 MG/1
4 TABLET, ORALLY DISINTEGRATING ORAL
Status: DISCONTINUED | OUTPATIENT
Start: 2023-01-15 | End: 2023-01-16

## 2023-01-15 RX ADMIN — SODIUM CHLORIDE, PRESERVATIVE FREE 10 ML: 5 INJECTION INTRAVENOUS at 15:19

## 2023-01-15 RX ADMIN — SODIUM CHLORIDE, PRESERVATIVE FREE 40 MG: 5 INJECTION INTRAVENOUS at 07:39

## 2023-01-15 RX ADMIN — HEPARIN SODIUM 4000 UNITS: 1000 INJECTION INTRAVENOUS; SUBCUTANEOUS at 06:14

## 2023-01-15 RX ADMIN — POLYETHYLENE GLYCOL 3350 17 G: 17 POWDER, FOR SOLUTION ORAL at 15:19

## 2023-01-15 RX ADMIN — HEPARIN SODIUM 3000 UNITS: 1000 INJECTION INTRAVENOUS; SUBCUTANEOUS at 23:14

## 2023-01-15 RX ADMIN — HEPARIN SODIUM 11 UNITS/KG/HR: 10000 INJECTION, SOLUTION INTRAVENOUS at 19:19

## 2023-01-15 RX ADMIN — SODIUM CHLORIDE 500 ML: 9 INJECTION, SOLUTION INTRAVENOUS at 06:07

## 2023-01-15 RX ADMIN — EPTIFIBATIDE 2 MCG/KG/MIN: 0.75 INJECTION, SOLUTION INTRAVENOUS at 10:00

## 2023-01-15 RX ADMIN — ONDANSETRON 4 MG: 2 INJECTION INTRAMUSCULAR; INTRAVENOUS at 06:34

## 2023-01-15 RX ADMIN — SODIUM CHLORIDE, PRESERVATIVE FREE 10 ML: 5 INJECTION INTRAVENOUS at 14:14

## 2023-01-15 RX ADMIN — FENTANYL CITRATE 50 MCG: 50 INJECTION, SOLUTION INTRAMUSCULAR; INTRAVENOUS at 06:08

## 2023-01-15 RX ADMIN — DOCUSATE SODIUM 100 MG: 100 CAPSULE, LIQUID FILLED ORAL at 15:19

## 2023-01-15 RX ADMIN — HEPARIN SODIUM 11 UNITS/KG/HR: 10000 INJECTION, SOLUTION INTRAVENOUS at 14:09

## 2023-01-15 RX ADMIN — MUPIROCIN: 20 OINTMENT TOPICAL at 17:34

## 2023-01-15 RX ADMIN — ASPIRIN 243 MG: 81 TABLET, CHEWABLE ORAL at 06:08

## 2023-01-15 RX ADMIN — SODIUM CHLORIDE, PRESERVATIVE FREE 10 ML: 5 INJECTION INTRAVENOUS at 23:08

## 2023-01-15 RX ADMIN — SODIUM CHLORIDE, PRESERVATIVE FREE 40 MG: 5 INJECTION INTRAVENOUS at 14:14

## 2023-01-15 NOTE — ED PROVIDER NOTES
EMERGENCY DEPARTMENT HISTORY AND PHYSICAL EXAM      Date: 1/15/2023  Patient Name: Sixto Snyder    History of Presenting Illness     Chief Complaint   Patient presents with    Chest Pain       History Provided By: Patient    HPI: Sixto Snyder, 60 y.o. male with PMHx as noted below presents the emergency department chief complaint of chest pain. Patient had onset of severe chest pain that began approximately midnight. Pain is constant, nonradiating. Denies any cardiac history. Denies vomiting, abdominal pain, recent illness    PCP: Jaun Rodriguez MD    Current Facility-Administered Medications   Medication Dose Route Frequency Provider Last Rate Last Admin    heparin (porcine) 1,000 unit/mL injection 4,000 Units  4,000 Units IntraVENous Marge Cid MD         Current Outpatient Medications   Medication Sig Dispense Refill    aspirin 81 mg chewable tablet Take 81 mg by mouth daily. amLODIPine (NORVASC) 10 mg tablet Take 10 mg by mouth daily. Indications: HYPERTENSION      metoprolol tartrate (LOPRESSOR) 50 mg tablet Take 50 mg by mouth two (2) times a day. Indications: HYPERTENSION      ramipril (ALTACE) 2.5 mg capsule Take 1 Cap by mouth daily. 30 Cap 3    omeprazole (PRILOSEC) 20 mg capsule Take 1 Cap by mouth daily. 30 Cap 3    Insulin Syringes, Disposable, 1 mL syrg As directed 50 Syringe 3    insulin glargine (LANTUS) 100 unit/mL injection 25 units sq qday (Patient not taking: Reported on 1/15/2023) 1 Vial 1    insulin lispro (HUMALOG) 100 unit/mL injection 8 units tid 1 Vial 3    pravastatin (PRAVACHOL) 40 mg tablet Take 40 mg by mouth nightly. Indications: HYPERCHOLESTEROLEMIA         Past History     Past Medical History:  Past Medical History:   Diagnosis Date    Anginal pain ([facility] [address]     Diabetes ([facility] [address]     new onset 2/2014    High cholesterol     Hypertension        Past Surgical History:  History reviewed. No pertinent surgical history. Family History:  History reviewed. No pertinent family history. Social History:  Social History     Tobacco Use    Smoking status: Some Days   Vaping Use    Vaping Use: Never used   Substance Use Topics    Alcohol use: No     Comment: drinks daily last at midnight    Drug use: Yes     Types: Marijuana       Allergies:  No Known Allergies      Review of Systems   Review of Systems  Pertinent positives negatives per HPI    Physical Exam   Physical Exam    GENERAL: alert and oriented, no acute distress  EYES: PEERL, No injection, discharge or icterus. ENT: Mucous membranes pink and moist.  NECK: Supple  LUNGS: Airway patent. Non-labored respirations. Breath sounds clear with good air entry bilaterally. HEART: Regular rate and rhythm. No peripheral edema  ABDOMEN: Non-distended and non-tender, without guarding or rebound. SKIN:  warm, diaphoretic  EXTREMITIES: Without swelling, tenderness or deformity, symmetric with normal ROM  NEUROLOGICAL: Alert, oriented      Diagnostic Study Results     Labs -         Radiologic Studies -   No orders to display     CT Results  (Last 48 hours)      None          CXR Results  (Last 48 hours)      None              Medical Decision Making     I reviewed the vital signs, available nursing notes, past medical history, past surgical history, family history and social history. Vital Signs-Reviewed the patient's vital signs. Patient Vitals for the past 12 hrs:   Temp Pulse Resp SpO2   01/15/23 0600 98.2 °F (36.8 °C) (!) 52 16 99 %           EKG interpretation: (Preliminary)  Rhythm: normal sinus rhythm; and regular . Rate (approx.): 55; Axis: normal; P wave: normal; QRS interval: normal ; ST/T wave: Inferior and anterior ST elevations was interpreted by Lien Sr MD,ED Provider. Provider Notes (Medical Decision Making): On presentation, the patient is ill-appearing, chest pain. EKG on arrival is consistent with inferior STEMI, likely RV infarct.   Patient was given 325 aspirin, 4000 units of heparin, will refrain from nitro due to the likely RV involvement. Patient was started on IV fluids. Consult with Dr. Nedra Marcelo, cardiology who will take patient emergently to the Cath Lab. ED Course:   Initial assessment performed. The patients presenting problems have been discussed, and they are in agreement with the care plan formulated and outlined with them. I have encouraged them to ask questions as they arise throughout their visit. Patient was given the following medications:  Medications   heparin (porcine) 1,000 unit/mL injection 4,000 Units (has no administration in time range)   aspirin chewable tablet 324 mg (243 mg Oral Given 1/15/23 0608)   sodium chloride 0.9 % bolus infusion 500 mL (500 mL IntraVENous New Bag 1/15/23 0607)   fentaNYL citrate (PF) injection 50 mcg (50 mcg IntraVENous Given 1/15/23 0608)              CONSULTS: (Who and What was discussed)  Cardiology: [facility] for details      PROGRESS:  The patient has been re-evaluated and sx have improved. Reviewed available results with patient and have counseled them on diagnosis and care plan. They have expressed understanding, and all their questions have been answered. They agree with plan for admission. Admit Note  Patient is being admitted to the cardiologist the results of their tests and reasons for their admission have been discussed with them and/or available family. They convey agreement and understanding for the need to be admitted and for their admission diagnosis. Consultation has been made with the inpatient physician specialist for hospitalization.     Critical Care Note      IMPENDING DETERIORATION -Cardiovascular  ASSOCIATED RISK FACTORS - Hypotension  MANAGEMENT- Bedside Assessment and Supervision of Care  INTERPRETATION -  ECG and Blood Pressure  INTERVENTIONS -Cath Lab activation, IV heparin  CASE REVIEW - Medical Sub-Specialist  TREATMENT RESPONSE -Stable  PERFORMED BY - Self    NOTES   :  I have provided a total of 35 minutes of critical time not including time spent on separately documented procedures. The reason for providing this level of medical care for this critically ill patient was due to a critical illness that impaired one or more vital organ systems such that there was a high probability of imminent or life threatening deterioration in the patients condition. This care involved high complexity decision making to assess, manipulate, and support vital system functions,  lab review, consultations with specialist, family decision- making, bedside attention and documentation. During this entire length of time I was immediately available to the patient      Disposition:  admission    PLAN:  1. Admit     Diagnosis     Clinical Impression:   1. ST elevation myocardial infarction (STEMI), unspecified artery [facility])        Please note that this dictation was completed with Dragon, computer voice recognition software. Quite often unanticipated grammatical, syntax, homophones, and other interpretive errors are inadvertently transcribed by the computer software. Please disregard these errors. Additionally, please excuse any errors that have escaped final proofreading.

## 2023-01-15 NOTE — BRIEF OP NOTE
Brief Postoperative Note    Patient: Boyd Felipe  YOB: 1960  MRN: [id number]    Date of Procedure: 1/15/2023     Pre-Op Diagnosis: STEMI (ST elevation myocardial infarction) ([facility] [address] [I21.3]    Post-Op Diagnosis: Same as preoperative diagnosis. Procedure(s):  LEFT HEART CATH / CORONARY ANGIOGRAPHY  PERCUTANEOUS CORONARY INTERVENTION  FRACTIONAL FLOW RESERVE    Surgeon(s):  Risa Augustine MD    Surgical Assistant: None    Anesthesia: Con-Sed     Estimated Blood Loss (mL): less than 089     Complications: None    Specimens: * No specimens in log *     Implants:   Implant Name Type Inv. Item Serial No.  Lot No. LRB No. Used Action   STENT CORONARY RX [id number] MM ZOTAROLIMUS ELUT SUE - [id number]  STENT CORONARY RX 4X38 MM ZOTAROLIMUS ELUT SUE  [employer][id number]   1 Implanted       Drains: * No LDAs found *    Findings: 100% occluded proximal RCA. Baseline CAROLINE 0 flow post angioplasty CAROLINE-3 flow. Patient has severely calcified RCA and left coronary system. Patient also have significant disease in the ramus branch and mid/distal LAD. I have recommended multivessel bypass surgery. Discussed with surgeon Dr. Mona Fletcher who have kindly excepted the patient. Angiogram reviewed with patient and wife. Complete report to follow.     Electronically Signed by Sina Cr MD on 1/15/2023 at 9:20 AM

## 2023-01-15 NOTE — PROGRESS NOTES
Cardiology Progress Note        Patient: Jacqueline Han        Sex: male          DOA: 1/15/2023  YOB: 1960      Age:  60 y.o.        LOS:  LOS: 0 days   Assessment/Plan   Cath angiogram reviewed with patient and wife  Finding of Cath Lab was also discussed with patient  Patient will need CABG  Discussed with Dr. Jenny Molina who have kindly accepted the patient for CABG  Discussed with Dr. Susan Trinidad hospitalist at [facility] who have kindly accepted the patient on his service  Discussed with Dr. Patricia Donovan.    Thx    Signed By: Niranjan Arreguin MD     January 15, 2023

## 2023-01-15 NOTE — ED TRIAGE NOTES
Pt reports he has chest pain, nausea \"and I feel like I have a fever\"  Pt ambulates to room w/o complications. Pt alert and oriented.

## 2023-01-15 NOTE — H&P
History & Physical    Patient: Boyd Felipe MRN: [id number]  CSN: [id number]    YOB: 1960  Age: 60 y.o. Sex: male      DOA: 1/15/2023  Primary Care Provider:  Maureen Sever, MD      Assessment/Plan     Hospital Problems  Date Reviewed: 2/28/2014            Codes Class Noted POA    * (Principal) STEMI (ST elevation myocardial infarction) ([facility] [address] ICD-10-CM: I21.3  ICD-9-CM: 410.90  1/15/2023 Unknown    Overview Signed 1/15/2023  6:26 AM by Raquel Pratt     Added automatically from request for surgery [id number]             Essential hypertension, benign ICD-10-CM: I10  ICD-9-CM: 401.1  2/28/2014 Yes        Hyperosmolar (nonketotic) coma ([facility] ICD-10-CM: E11.01  ICD-9-CM: 250.20  2/27/2014 Yes           Pt was admitted for stemi   Admit to icu      CRITICAL CARE PLAN    Resp -   No acute issue now   ID - so far no infection     CVS -   Stemi -inferior wall stemi   Post cath, needs cabg    Case discussed with Dr. Juan Manuel Boyce   Echo , trop trend , ntiro prn  Will continue heparin gtt, lipitor and aspirin , bbb will defer to cardiologist    Bed rest     Hypotension in cath lab  Resolved per short time levophed   Monitor HD. Wean pressors, levo as needed  for MAP>65. Heme/onc -   Will continue monitoring   Follow H&H, plts. INR. Renal - Trend BUN, Cr, follow I/O, stevenson in place. Check and replace Mg, K, phos. Icu electrolytes placement     Endocrine -    DM type II , not on insulin at home,ssi , npo now     Neuro/  Alert and oriented  Morphine prn for chest pain     GI - NPO for now. NG tube, tube feeding. Prophylaxis - DVT: heparin, GI: protonix       Full code   Please note that this dictation was completed with Jamplify, the [employer] voice recognition software. Quite often unanticipated grammatical, syntax, homophones, and other interpretive errors are inadvertently transcribed by the computer software. Please disregard these errors.   Please excuse any errors that have escaped final proofreading    Wife was at the bedside    60 minutes of critical care time spent in the direct evaluation and treatment of this high risk patient. The reason for providing this level of medical care for this critically ill patient was due a critical illness that impaired one or more vital organ systems such that there was a high probability of imminent or life threatening deterioration in the patients condition. This care involved high complexity decision making to assess, manipulate, and support vital system functions, to treat this degreee vital organ system failure and to prevent further life threatening deterioration of the patients condition. Estimate  length of stay : need to be transfer to [facility]     DVT : heparin gtt   CC: chest pain        HPI:     Sue Velasco is a 60 y.o. male with history of diabetes, hypertension, hyperlipidemia presented to ER due to chest pain started at midnight. The chest pain is sharp having pain, located on the middle of the chest no radiation. EKG indicated STEMI. Code STEMI is called in ER. Patient Was Sent to Cath Lab directly from ER. Visit Vitals  BP (!) 162/90 (BP 1 Location: Right upper arm, BP Patient Position: At rest)   Pulse (!) 59   Temp 98.2 °F (36.8 °C)   Resp 18   Ht 6' 1\" (1.854 m)   Wt 88.5 kg (195 lb)   SpO2 100%   BMI 25.73 kg/m²    O2 Flow Rate (L/min): 4 l/min O2 Device: Nasal cannula      Past Medical History:   Diagnosis Date    Anginal pain (HCC)     Diabetes ([facility] [address]     new onset 2/2014    High cholesterol     Hypertension      Hx of surgery none   Fhx HTn parents     Social History     Socioeconomic History    Marital status:    Tobacco Use    Smoking status: Some Days   Vaping Use    Vaping Use: Never used   Substance and Sexual Activity    Alcohol use: No     Comment: drinks daily last at midnight    Drug use: Yes     Types: Marijuana       Prior to Admission medications    Medication Sig Start Date End Date Taking?  Authorizing Provider   aspirin 81 mg chewable tablet Take 81 mg by mouth daily. Yes Other, MD Laura   amLODIPine (NORVASC) 10 mg tablet Take 10 mg by mouth daily. Indications: HYPERTENSION   Yes Provider, Historical   metoprolol tartrate (LOPRESSOR) 50 mg tablet Take 50 mg by mouth two (2) times a day. Indications: HYPERTENSION   Yes Provider, Historical   ramipril (ALTACE) 2.5 mg capsule Take 1 Cap by mouth daily. 3/1/14   Srinivas Ashton MD   omeprazole (PRILOSEC) 20 mg capsule Take 1 Cap by mouth daily. 3/1/14   Srinivas Ashton MD   Insulin Syringes, Disposable, 1 mL syrg As directed 3/1/14   Srinivas Ashton MD   insulin glargine (LANTUS) 100 unit/mL injection 25 units sq qday  Patient not taking: Reported on 1/15/2023 3/1/14   Srinivas Ashton MD   insulin lispro (HUMALOG) 100 unit/mL injection 8 units tid 3/1/14   Srinivas Ashton MD   pravastatin (PRAVACHOL) 40 mg tablet Take 40 mg by mouth nightly. Indications: HYPERCHOLESTEROLEMIA    Provider, Historical       No Known Allergies    Review of Systems  Gen: No fever, chills, malaise, weight loss/gain. Heent: No headache, rhinorrhea, epistaxis, ear pain, hearing loss, sinus pain, neck pain/stiffness, sore throat. Heart: +chest pain, no  palpitations, CASSIDY, pnd, or orthopnea. Resp: No cough, hemoptysis, wheezing and shortness of breath. GI: No nausea, vomiting, diarrhea, constipation, melena or hematochezia. : No urinary obstruction, dysuria or hematuria. Derm: No rash, new skin lesion or pruritis. Musc/skeletal: no bone or joint complains. Vasc: No edema, cyanosis or claudication. Endo: No heat/cold intolerance, no polyuria,polydipsia or polyphagia. Neuro: No unilateral weakness, numbness, tingling. No seizures. Heme: No easy bruising or bleeding.           Physical Exam:     Physical Exam:  Visit Vitals  BP (!) 162/90 (BP 1 Location: Right upper arm, BP Patient Position: At rest)   Pulse (!) 59   Temp 98.2 °F (36.8 °C)   Resp 18   Ht 6' 1\" (1.854 m) Wt 88.5 kg (195 lb)   SpO2 100%   BMI 25.73 kg/m²    O2 Flow Rate (L/min): 4 l/min O2 Device: Nasal cannula    Temp (24hrs), Av.2 °F (36.8 °C), Min:98.2 °F (36.8 °C), Max:98.2 °F (36.8 °C)    No intake/output data recorded. No intake/output data recorded. General:  Awake, cooperative, no distress. Head:  Normocephalic, without obvious abnormality, atraumatic. Eyes:  Conjunctivae/corneas clear, sclera anicteric, PERRL, EOMs intact. Nose: Nares normal. No drainage or sinus tenderness. Throat: Lips, mucosa, and tongue normal. .   Neck: Supple, symmetrical, trachea midline, no adenopathy. Lungs:   Clear to auscultation bilaterally. Heart:  Regular rate and rhythm, S1, S2 normal, no murmur, click, rub or gallop. Abdomen: Soft, non-tender. Bowel sounds normal. No masses,  No organomegaly. Extremities: Extremities normal, atraumatic, no cyanosis or edema. Pulses: 2+ and symmetric all extremities. Skin: Skin color-pink, texture, turgor normal. No rashes or lesions. Capillary refill normal    Neurologic: CNII-XII intact. No focal motor or sensory deficit.        Labs Reviewed:    BMP:   Lab Results   Component Value Date/Time     (L) 01/15/2023 06:11 AM    K 3.8 01/15/2023 06:11 AM    CL 92 (L) 01/15/2023 06:11 AM    CO2 23 01/15/2023 06:11 AM    AGAP 14 01/15/2023 06:11 AM     (H) 01/15/2023 06:11 AM    BUN 4 (L) 01/15/2023 06:11 AM    CREA 0.72 01/15/2023 06:11 AM     CMP:   Lab Results   Component Value Date/Time     (L) 01/15/2023 06:11 AM    K 3.8 01/15/2023 06:11 AM    CL 92 (L) 01/15/2023 06:11 AM    CO2 23 01/15/2023 06:11 AM    AGAP 14 01/15/2023 06:11 AM     (H) 01/15/2023 06:11 AM    BUN 4 (L) 01/15/2023 06:11 AM    CREA 0.72 01/15/2023 06:11 AM    CA 10.0 01/15/2023 06:11 AM    ALB 4.0 01/15/2023 06:11 AM    TP 7.8 01/15/2023 06:11 AM    GLOB 3.8 01/15/2023 06:11 AM    AGRAT 1.1 01/15/2023 06:11 AM     (H) 01/15/2023 06:11 AM     CBC:   Lab Results   Component Value Date/Time    WBC 8.3 01/15/2023 06:11 AM    HGB 15.9 01/15/2023 06:11 AM    HCT 43.6 01/15/2023 06:11 AM     01/15/2023 06:11 AM     All Cardiac Markers in the last 24 hours: No results found for: CPK, CK, CKMMB, CKMB, RCK3, CKMBT, CKNDX, CKND1, YARELIS, TROPT, TROIQ, ARIELA, TROPT, TNIPOC, BNP, BNPP  Recent Glucose Results:   Lab Results   Component Value Date/Time     (H) 01/15/2023 06:11 AM     ABG: No results found for: PH, PHI, PCO2, PCO2I, PO2, PO2I, HCO3, HCO3I, FIO2, FIO2I  COAGS:   Lab Results   Component Value Date/Time    APTT 26.3 01/15/2023 06:11 AM    PTP 12.6 01/15/2023 06:11 AM    INR 0.9 01/15/2023 06:11 AM     Liver Panel:   Lab Results   Component Value Date/Time    ALB 4.0 01/15/2023 06:11 AM    TP 7.8 01/15/2023 06:11 AM    GLOB 3.8 01/15/2023 06:11 AM    AGRAT 1.1 01/15/2023 06:11 AM     (H) 01/15/2023 06:11 AM     01/15/2023 06:11 AM     Pancreatic Markers: No results found for: AMYLPOCT, AML, LIPPOCT, LPSE    No results found.   Procedures/imaging: see electronic medical records for all procedures/Xrays and details which were not copied into this note but were reviewed prior to creation of Rafa Samano MD, Internal Medicine     CC: Sonia Angelucci, MD

## 2023-01-15 NOTE — ED NOTES
Dr Randolph Pérez has left bedside. Pt has signed consent for for the cath lab procedure, pt remains diaphoretic. Pt ready for transport.

## 2023-01-15 NOTE — PROGRESS NOTES
Discussed with Dr Candida Bowens at [facility] regarding transfer to [facility] CVT ICU. Pt presented with inferior wall STEMI and taken to Cath lab - Shows Calcified RCA - pre Cath CAROLINE flow 0, Post cath CAROLINE flow 3. Also shows moderate to severe LAD stenosis - pt is a candidate for CABG, discussed with Dr Magda Jason. Pt will be accepted to CVT ICU.

## 2023-01-15 NOTE — PROGRESS NOTES
Reason for Admission:  ED chart reviewed, patient is a \" 60 y.o. male with PMHx as noted below presents the emergency department chief complaint of chest pain. Patient had onset of severe chest pain that began approximately midnight. Pain is constant, nonradiating. Denies any cardiac history. Denies vomiting, abdominal pain, recent illness. \" STEMI by EKG, to cath lab for left heart cath and PCI.                      RUR Score:      low, 9%               Plan for utilizing home health:     TBD, do not anticipate     PCP: First and Last name:  Bing Sandhoff, MD     Name of Practice: Family Medicine   Are you a current patient: Yes/No:    Approximate date of last visit:    Can you participate in a virtual visit with your PCP:                     Current Advanced Directive/Advance Care Plan: Full Code      Healthcare Decision Maker:   Click here to complete [id number] including selection of the Healthcare Decision Maker Relationship (ie \"Primary\")                             Transition of Care Plan:

## 2023-01-15 NOTE — CONSULTS
[facility]  -  Consult      1/15/2023        Umair العلي is a 60 y.o. male who is being seen in consult for STEMI from coronary artery disease, at  Dr. Monique Rooney request.      Assessment:     Inferior STEMI from severe three-vessel coronary artery disease, including left main, now status post balloon angioplasty of RCA. Diabetes mellitus  Hypertension  Hypercholesterolemia  Daily drinker with history of withdrawals, no seizures or AMS  Daily smoker x25 years  Transaminitis  Marijuana use  COVID vaccinated (obtaining documentation for EMR)  Bilateral foot diabetic neuropathy      Plan:     1. The pathophysiology and treatment options were discussed by Dr. Casimiro Agudelo and the patient and his family. Recommendations of surgical revascularization were made. The procedure, complications and expected recovery were discussed and questions were answered. We will plan on CABG tomorrow morning. 2.  We will put him on alcohol withdrawal protocol, check ammonia and assess CT scan for signs of cirrhosis. Complex decision making used for acute and/or chronic illness with multiple treatment options and risks defined. He will need the additional preoperative tests, which I will order:  Type and cross for 2 units PRBC  CBC  BMP  Liver function tests  HbA1c  EKG  Chest CT scan to assess the size of the ascending aorta, to check for ascending aortic calcifications  Echocardiogram to assess RV and LV function and assess for valvular abnormality  Carotid duplex scanning given left main disease  Room air arterial blood gas given extensive smoking history  COVID rule out if COVID vaccination documentation cannot be obtained  No amiodarone prophylaxis due to bradycardia    Subjective:     CHIEF COMPLAINT: Currently without complaint.     History of Present Illness:     77-year-old male patient who works nights dinner at midnight last night per his routine when he developed chest pain, diaphoresis and then, several hours later, nausea, short of breath and a progression in his chest pain. He is a daily long-term smoker and drinker. He denies any frequent respiratory infections or pneumonias. He does not have a daily cough. He is at least a sixpack drinker per day. His daughter reports withdrawal symptoms not including altered mental status or seizures in the past when he is attempted to stop drinking. He denies any diagnosis of cirrhosis. He denies any GI bleed. He sought treatment at [facility].  He was found to have an inferior STEMI. He was brought to the Cath Lab and found to have a totally occluded RCA which was balloon angioplastied with good results. He has significant three-vessel coronary artery disease including left main disease. His LV gram appeared normal.  He is currently chest pain-free. He was started on heparin and Angiomax and required low dose levo for support. [facility]. He did not arrive on levo and he was not hypotensive. Past Medical History:     Past Medical History:   Diagnosis Date    Anginal pain ([facility] [address]     Diabetes ([facility] [address]     new onset 2/2014    High cholesterol     Hypertension        Past Surgical History:   Denies    Social History:    with 3 adult children. Daily smoker for 25 years. Daily heavy drinker. Works as a  at night. Family History:   Family history reviewed and does not include a first or second degree relative with heart disease. Allergies and Intolerances:   No Known Allergies    Home Medications:     Prior to Admission Medications   Prescriptions Last Dose Informant Patient Reported? Taking? Insulin Syringes, Disposable, 1 mL syrg   No No   Sig: As directed   amLODIPine (NORVASC) 10 mg tablet   Yes No   Sig: Take 10 mg by mouth daily.  Indications: HYPERTENSION   aspirin 81 mg chewable tablet   Yes No Sig: Take 81 mg by mouth daily. atorvastatin (LIPITOR) 80 mg tablet   No No   Sig: Take 1 Tablet by mouth daily. heparin sodium,porcine/D5W (heparin 25,000 units in D5W 250 ml) 25,000 unit/250 mL(100 unit/mL) infusion   No No   Si.5-2,212.5 Units/hr by IntraVENous route TITRATE.   insulin glargine (LANTUS) 100 unit/mL injection   No No   Si units sq qday   Patient not taking: Reported on 1/15/2023   insulin lispro (HUMALOG) 100 unit/mL injection   No No   Sig: INITIATE INSULIN CORRECTIVE PROTOCOL: Normal Insulin Sensitivity   For Blood Sugar (mg/dL) of:     Less than 150 =   0 units           150 -199 =   2 units  200 -249 =   4 units  250 -299 =   6 units  300 -349 =   8 units  350 and above = 10 units and Call Physician  If 2 glucose readings are above 200 mg/dL within a 24 hr period, proceed to \"Insulin Resistant\" dosing. Initiate Hypoglycemia protocol if blood glucose is <70 mg/dL  Fast Acting - Administer Immediately - or within 15 minutes of start of meal, if mealtime coverage. metoprolol tartrate (LOPRESSOR) 50 mg tablet   Yes No   Sig: Take 50 mg by mouth two (2) times a day. Indications: HYPERTENSION   nitroglycerin (NITROSTAT) 0.4 mg SL tablet   No No   Si Tablet by SubLINGual route every five (5) minutes as needed for Chest Pain for up to 3 doses. Up to 3 doses. omeprazole (PRILOSEC) 20 mg capsule   No No   Sig: Take 1 Cap by mouth daily. pravastatin (PRAVACHOL) 40 mg tablet   Yes No   Sig: Take 40 mg by mouth nightly. Indications: HYPERCHOLESTEROLEMIA   ramipril (ALTACE) 2.5 mg capsule   No No   Sig: Take 1 Cap by mouth daily.       Facility-Administered Medications: None       Current Facility-Administered Medications   Medication Dose Route Frequency Provider Last Rate Last Admin    [START ON 2023] atorvastatin (LIPITOR) tablet 80 mg  80 mg Oral DAILY Haylie Napier MD        pantoprazole (PROTONIX) 40 mg in 0.9% sodium chloride 10 mL injection  40 mg IntraVENous Q24H Haylie Napier MD        heparin (porcine) 25,000 units in 0.45% saline 250 ml infusion  11-25 Units/kg/hr IntraVENous TITRATE Haylie Napier MD        sodium chloride (NS) flush 5-40 mL  5-40 mL IntraVENous Q8H Haylie Napier MD        sodium chloride (NS) flush 5-40 mL  5-40 mL IntraVENous PRN Haylie Napier MD        acetaminophen (TYLENOL) tablet 650 mg  650 mg Oral Q6H PRN Haylie Napier MD        Or    acetaminophen (TYLENOL) suppository 650 mg  650 mg Rectal Q6H PRN Haylie Napier MD        polyethylene glycol (MIRALAX) packet 17 g  17 g Oral DAILY PRN Haylie Napier MD        ondansetron (ZOFRAN ODT) tablet 4 mg  4 mg Oral Q8H PRN Haylie Napier MD        Or    ondansetron [facility]) injection 4 mg  4 mg IntraVENous Q6H PRN Haylie Napier MD       .     Review of Systems:   As in  HPI including and:    Constitutional: No fever, unintentional weight loss or weight gain. Skin: No rashes, petechiae or easy bruising. Does report bumps on his feet. Denies open skin wounds. HENT: No headache, hearing loss or nosebleeds. He reports he has loose teeth but no known infection. Eyes: No visual field cuts or double vision. No drainage from eyes. Respiratory: No frequent respiratory infections or pneumonia. No history of difficult intubation. No history of wheezing. No hemoptysis. Cardiovascular: . No history of bulging veins, clots in the veins or infection of veins. Gastrointestinal: No constipation or diarrhea. No dark or bloody stools. No frequent nausea or vomiting. Genitourinary: No difficulty with urination. No urgency or frequency. No blood in urine. Musculoskeletal: No muscle or joint pain. No difficulty walking or climbing. Endo/Heme/Allergy: yes diabetes. no skin rashes. No easy or severe bleeding history. Neurological: No loss of consciousness, one sided weakness, difficulty with speech or eating or drinking.   No foot drop or difficulty with walking. He denies TIAs or strokes. All other systems reviewed and negative. Physical Examination:   There were no vitals taken for this visit. General: Well appearing, NAD   Psych:  Good eye contact and flow of speech and content   Eyes  PEARLA, anicteric   Ears/nose/mouth/throat:  MMM, tongue is midline. Poor dentition. Respiratory  Clear to auscultation without wheezes, rales or rhonchi   Cardiovascular:  Regular rate and rhythm without murmur or rub. 2+ distal pulses x4. Negative bruit. NO Varicose veins or evidence of chronic venous skin changes. Abdominal:  Not distended. Soft and non tender without HSM. Musculoskeletal  warm and well perfused. No clubbing,  cyanosis or edema. Neurological:  DARDEN x 4 strong and equal.  No motor or sensory loss noted     Laboratory Data:     Lab Results   Component Value Date/Time    WBC 8.4 01/15/2023 10:12 AM    HGB 16.3 (H) 01/15/2023 10:12 AM    HCT 44.4 01/15/2023 10:12 AM    PLATELET 810 42/67/2048 10:12 AM     Lab Results   Component Value Date/Time    Sodium 129 (L) 01/15/2023 06:11 AM    Potassium 3.8 01/15/2023 06:11 AM    Chloride 92 (L) 01/15/2023 06:11 AM    CO2 23 01/15/2023 06:11 AM    Glucose 128 (H) 01/15/2023 06:11 AM    BUN 4 (L) 01/15/2023 06:11 AM    Creatinine 0.72 01/15/2023 06:11 AM     Lab Results   Component Value Date/Time    Cholesterol, total 140 03/01/2014 04:28 AM    HDL Cholesterol 27 (L) 03/01/2014 04:28 AM    LDL, calculated 74.2 03/01/2014 04:28 AM    Triglyceride 194 (H) 03/01/2014 04:28 AM     Lab Results   Component Value Date/Time    Hemoglobin A1c >16.0 (H) 02/27/2014 07:25 AM     Recent Labs     01/15/23  0611   CA 10.0   ALB 4.0   TP 7.8   TBILI 0.3     ABG:  No results found for: PH, PHI, PCO2, PCO2I, PO2, PO2I, HCO3, HCO3I, FIO2, FIO2I  No results for input(s): TROIQ, CPK, CKMB in the last 72 hours.     STS RISK:      Risk of Mortality:  0.882%  Renal Failure:  0.698%  Permanent Stroke:  1.327%  Prolonged Ventilation:  7.307%  DSW Infection:  0.181%  Reoperation:  1.570%  Morbidity or Mortality:  9.920%  Short Length of Stay:  57.435%  Long Length of Stay:  4.194%    EKG Results       None          ECHO: pending    STRESS TEST: None    01/15/23    CARDIAC PROCEDURE 01/15/2023 (Needs Review) 1/15/2023  This result has not been signed. Information might be incomplete. Conclusion  Acute inferior STEMI    100% occluded proximal/mid very calcified large RCA. Baseline CAROLINE 0 flow post angioplasty CAROLINE-3 flow. Due to severe calcification stent is unable to cross the lesion. Moderate-sized ramus intermedius have a 80% calcified ostial/proximal disease. Large sized mid/distal LAD have 80% disease. Large left main artery have ostial 40% to 50% disease in cranial views with nonischemic IFR of 0.96. LVEDP 16 mmHg. Recommendation. Multivessel CABG. Discussed with patient and wife both agreed. Signed by: Chaim Mcclain MD on 1/15/2023 10:36 AM      RADIOLOGY DATA: (images independently reviewed)   XR Results (most recent):  Results from Hospital Encounter encounter on 01/15/23    XR CHEST PORT    Narrative  EXAM: CHEST RADIOGRAPH    CLINICAL INDICATION/HISTORY: chest pain  > Additional: None    COMPARISON: None    TECHNIQUE: Portable frontal view of the chest    _______________    FINDINGS:    SUPPORT DEVICES: None. HEART AND MEDIASTINUM: Heart size is normal.    LUNGS AND PLEURAL SPACES: No focal consolidation, effusion, or pneumothorax. BONES AND SOFT TISSUES: Unremarkable.    _______________    Impression  No active cardiopulmonary disease. Stanley Callaway PA-C    ATTENDING ADDENDUM:  I saw and examined the patient. I agree with the history and physical examination as well as assessment documented above. 58-year-old gentleman admitted to [facility] with chest pain starting at midnight.   Patient ruled in for STEMI and was taken emergently to cardiac catheterization laboratory. Patient was found to have a totally occluded right coronary artery in the setting of a very heavily calcified vessel. Percutaneous coronary intervention resulted in recanalization and establishment of CAROLINE-3 flow using balloon angioplasty however due to heavy calcification intracoronary stent could not be placed. Patient also has significant stenosis of the ramus intermedius as well as distal left main and ostial left main disease as well as proximal left anterior descending artery stenosis. Patient is diabetic, hypertensive, and he has history of heavy alcohol abuse. He is also a smoker. An echocardiogram is pending at the time of this dictation. I reviewed the cardiac catheterization images personally and discussed with Dr. Angel Scott. Considering the significant three-vessel coronary artery disease and apparent left mainstem coronary artery stenosis, a large right coronary artery with heavy calcification being the culprit with limited percutaneous options, in the setting of diabetes, hypertension, heavy tobacco abuse and high risk for recurrent ischemic event due to less than optimal revascularization of the right coronary artery lesion, I think patient will benefit from urgent coronary artery bypass graft surgery. Patient is chest pain-free at the moment on Integrilin as well as heparin. I spoke to the patient and his daughter and son-in-law in great detail regarding the extent of coronary artery disease, degenerative adequately revascularized right coronary artery due to heavy calcification, treatment options including continued medical therapy versus coronary artery bypass graft surgery. I explained the risks and benefits of both treatment options in great detail. Surgical revascularization procedure, postprocedure hospital stay, recuperation after discharge from the hospital was discussed with the patient and his family in great detail.   Risks of coronary artery bypass graft surgery including but not limited to infection the sternotomy incision which might require drainage and debridement, bleeding and blood transfusion, perioperative myocardial infarction, perioperative organ failure, perioperative liver failure, perioperative stroke, perioperative death was discussed with the patient and family in great detail. They understand and they are ready to proceed. Patient will have a CT scan of the chest without contrast, bilateral carotid duplex ultrasounds, a set of liver function tests including ammonia, hemoglobin A1c to establish degree of diabetic control will be ordered. Patient will be scheduled early tomorrow morning considering that he stays chest pain-free. PLEASE NOTE:  This document has been produced using voice recognition software. Unrecognized errors in transcription may be present. NOTE TO PATIENT:  The purpose of this note is to communicate optimally with the other providers involved in your care. It is written using standard medical terminology. If you have questions regarding details of the note please call my office at [phone] and make an appointment to discuss your concerns.

## 2023-01-15 NOTE — Clinical Note
TRANSFER - OUT REPORT:     Verbal report given to: ED RN. Report consisted of patient's Situation, Background, Assessment and   Recommendations(SBAR). Opportunity for questions and clarification was provided. Patient transported with a Registered Nurse and [address] [place] [address] / [facility].

## 2023-01-15 NOTE — PROGRESS NOTES
TRANSFER - OUT REPORT:    Verbal report given to Kanika RN(name) on Sourav Yeboah  being transferred to [facility] RN(unit) for urgent transfer       Report consisted of patients Situation, Background, Assessment and   Recommendations(SBAR). Information from the following report(s) SBAR, Kardex, Intake/Output, and MAR was reviewed with the receiving nurse. Lines:   Peripheral IV 01/15/23 Left Antecubital (Active)       Peripheral IV 01/15/23 Arm (Active)   Site Assessment Clean, dry, & intact 01/15/23 0617   Phlebitis Assessment 0 01/15/23 0617   Infiltration Assessment 0 01/15/23 0617   Dressing Status Clean, dry, & intact 01/15/23 0617   Hub Color/Line Status Pink 01/15/23 0617        Opportunity for questions and clarification was provided.       Patient transported with:   O2 @ 4 liters  Tech

## 2023-01-15 NOTE — CONSULTS
[facility] Consult Note      Patient: Dorie Costa MRN: [id number]  SSN: [id number]    YOB: 1960  Age: 60 y.o. Sex: male    Date of Consultation: 1/15/2023    Reason for Consultation: Chest pain and inferior STEMI    HPI:  I was asked by  to see this pleasant patient for chest pain. Carmen Jaffe is a 65-year-old gentleman with a history of diabetes, hyperlipidemia, hypertension came to the hospital with severe chest pain started in the middle of night and found to have inferior STEMI on EKG. Code STEMI was called I have discussed with Dr. Xochilt Merchant. Patient started having chest pain 9/10 in intensity central in location associated with some nauseated feeling and sweating no shortness of breath. Patient denied any history of CAD, PCI, CABG. No history of DVT pulmonary embolism  No history of GI bleeding. No plan for recent history of surgery. Past Medical History:   Diagnosis Date    Anginal pain ([facility] [address]     Diabetes ([facility] [address]     new onset 2/2014    High cholesterol     Hypertension      History reviewed. No pertinent surgical history.   Current Facility-Administered Medications   Medication Dose Route Frequency    ondansetron (ZOFRAN) 4 mg/2 mL injection        sodium chloride (NS) flush 5-40 mL  5-40 mL IntraVENous Q8H    sodium chloride (NS) flush 5-40 mL  5-40 mL IntraVENous PRN    acetaminophen (TYLENOL) tablet 650 mg  650 mg Oral Q6H PRN    Or    acetaminophen (TYLENOL) suppository 650 mg  650 mg Rectal Q6H PRN    polyethylene glycol (MIRALAX) packet 17 g  17 g Oral DAILY PRN    ondansetron (ZOFRAN ODT) tablet 4 mg  4 mg Oral Q8H PRN    Or    ondansetron (ZOFRAN) injection 4 mg  4 mg IntraVENous Q6H PRN    [START ON 1/16/2023] aspirin chewable tablet 81 mg  81 mg Oral DAILY    nitroglycerin (NITROSTAT) tablet 0.4 mg  0.4 mg SubLINGual Q5MIN PRN    acetaminophen (TYLENOL) tablet 650 mg  650 mg Oral Q4H PRN    morphine injection 1 mg  1 mg IntraVENous Q4H PRN    atorvastatin (LIPITOR) tablet 80 mg  80 mg Oral DAILY    glucose chewable tablet 16 g  4 Tablet Oral PRN    glucagon (GLUCAGEN) injection 1 mg  1 mg IntraMUSCular PRN    dextrose 10% infusion 0-250 mL  0-250 mL IntraVENous PRN    ELECTROLYTE REPLACEMENT PROTOCOL - Potassium Standard Dosing   1 Each Other PRN    ELECTROLYTE REPLACEMENT PROTOCOL - Magnesium   1 Each Other PRN    ELECTROLYTE REPLACEMENT PROTOCOL - Phosphorus  Standard Dosing  1 Each Other PRN    ELECTROLYTE REPLACEMENT PROTOCOL - Calcium   1 Each Other PRN    eptifibatide (INTEGRILIN) 0.75 mg/mL infusion  2 mcg/kg/min IntraVENous CONTINUOUS    heparin 25,000 units in D5W 250 ml infusion  11-25 Units/kg/hr IntraVENous TITRATE    insulin lispro (HUMALOG) injection   SubCUTAneous Q6H       Allergies and Intolerances:   No Known Allergies    Family History:   History reviewed. No pertinent family history. Social History:   He  reports that he has been smoking. He does not have any smokeless tobacco history on file. He  reports no history of alcohol use. Review of Systems:     Review of Systems  Gen: No fever, chills, malaise, weight loss/gain. Heent: No headache, rhinorrhea, epistaxis, ear pain, hearing loss, sinus pain, neck pain/stiffness, sore throat. Heart: +chest pain, palpitations, CASSIDY, pnd, or orthopnea. Resp: No cough, hemoptysis, wheezing and shortness of breath. GI: No nausea, vomiting, diarrhea, constipation, melena or hematochezia. : No urinary obstruction, dysuria or hematuria. Derm: No rash, new skin lesion or pruritis. Musc/skeletal: no bone or joint complains. Vasc: No edema, cyanosis or claudication. Endo: No heat/cold intolerance, no polyuria,polydipsia or polyphagia. Neuro: No unilateral weakness, numbness, tingling. No seizures. Heme: No easy bruising or bleeding.         Physical:   Patient Vitals for the past 6 hrs:   Temp Pulse Resp BP SpO2   01/15/23 0842 -- (!) 59 18 (!) 162/90 100 %   01/15/23 0638 -- (!) 55 17 -- 100 %   01/15/23 0636 -- 60 16 112/75 100 %   01/15/23 0630 -- (!) 47 16 98/66 100 %   01/15/23 0627 -- (!) 49 -- 93/64 100 %   01/15/23 0625 -- (!) 48 19 -- 100 %   01/15/23 0624 -- -- -- -- 100 %   01/15/23 0622 -- (!) 48 16 (!) 90/59 100 %   01/15/23 0600 98.2 °F (36.8 °C) (!) 52 16 -- 99 %         Exam:   General Appearance: Patient is in moderate distress  HEENT: EDGAR. HEAD: Atraumatic  NECK: No JVD, no thyroidomeglay. LUNGS: Clear bilaterally. HEART: S1+S2     ABD: Non-tender, BS Audible    EXT: No edema, and no cysnosis. VASCULAR EXAM: Pulses are intact. PSYCHIATRIC EXAM: Mood is appropriate. MUSCULOSKELETAL: Grossly no joint deformity. NEUROLOGICAL: Motor and sensory sytem intact and Cranial nerves II-XII intact.     Review of Data:   LABS:   Lab Results   Component Value Date/Time    WBC 8.3 01/15/2023 06:11 AM    HGB 15.9 01/15/2023 06:11 AM    HCT 43.6 01/15/2023 06:11 AM    PLATELET 250 98/37/3213 06:11 AM     Lab Results   Component Value Date/Time    Sodium 129 (L) 01/15/2023 06:11 AM    Potassium 3.8 01/15/2023 06:11 AM    Chloride 92 (L) 01/15/2023 06:11 AM    CO2 23 01/15/2023 06:11 AM    Glucose 128 (H) 01/15/2023 06:11 AM    BUN 4 (L) 01/15/2023 06:11 AM    Creatinine 0.72 01/15/2023 06:11 AM     Lab Results   Component Value Date/Time    Cholesterol, total 140 03/01/2014 04:28 AM    HDL Cholesterol 27 (L) 03/01/2014 04:28 AM    LDL, calculated 74.2 03/01/2014 04:28 AM    Triglyceride 194 (H) 03/01/2014 04:28 AM     No components found for: GPT  Lab Results   Component Value Date/Time    Hemoglobin A1c >16.0 (H) 02/27/2014 07:25 AM       RADIOLOGY:  CT Results  (Last 48 hours)      None          CXR Results  (Last 48 hours)      None              Cardiology Procedures:      Results for orders placed or performed during the hospital encounter of 01/15/23   EKG, 12 LEAD, INITIAL   Result Value Ref Range    Ventricular Rate 55 BPM    Atrial Rate 55 BPM    P-R Interval 242 ms    QRS Duration 90 ms    Q-T Interval 376 ms    QTC Calculation (Bezet) 359 ms    Calculated P Axis 34 degrees    Calculated R Axis 43 degrees    Calculated T Axis 83 degrees    Diagnosis       Critical Test Result: STEMI  Sinus bradycardia with 1st degree AV block  Inferior infarct , new  Anterior infarct (cited on or before 15-JOHNNY-2023)  ** ** ACUTE MI / STEMI ** **  Consider right ventricular involvement in acute inferior infarct  Abnormal ECG  When compared with ECG of 27-FEB-2014 00:57,  [place] interval has increased  Acute Inferior infarct is now present       Echo Results  (Last 48 hours)      None         Cardiolite (Tc-99m Sestamibi) stress test        Impression / Plan:    Patient Active Problem List   Diagnosis Code    Hyperosmolar (nonketotic) coma ([facility]) E11.01    Type II or unspecified type diabetes mellitus without mention of complication, uncontrolled [id number]    Hypokalemia E87.6    Essential hypertension, benign I10    STEMI (ST elevation myocardial infarction) ([facility]) I21.3     Assessment and plan    Acute chest pain  Acute inferior STEMI possible RV infarct  Mild hypotension  Mild bradycardia  History of hypertension  Diabetes mellitus  Hyperlipidemia  Smoking history        Plan  Patient's clinical findings consistent with acute inferior massive AMI. I have recommended emergent cardiac catheterization possible PCI. Risk, benefits and alternatives were discussed in detail with the patient and patient agreed to proceed. Start aspirin  Start heparin bolus and then infusion  Continue with statin  Continue with antihypertensive medications  Rest of the recommendation after cath procedure  Patient was seen and examined in the ER  Discussed with ER physician.           Signed By: Niranjan Arreguin MD     January 15, 2023

## 2023-01-15 NOTE — Clinical Note
November 4, 2020        Vicki Basurto MD  [address]  [place] [address]             [facility]  [address] [facility]  [place] [place] [address]  Phone: [phone]  Fax: [phone]   Patient: Martínez Serrano   MR Number: [id number]   YOB: 2015   Date of Visit: 11/4/2020       Dear Dr. Basurto:    Thank you for referring Martínez Serrano to me for evaluation. Attached you will find relevant portions of my assessment and plan of care.    If you have questions, please do not hesitate to call me. I look forward to following Martínez Serrano along with you.    Sincerely,      Daniel Burton MD            CC  No Recipients    Enclosure          Physician has arrived.

## 2023-01-15 NOTE — H&P
HISTORY & PHYSICAL      Patient: Robert Montero MRN: [id number]  CSN: [id number]    YOB: 1960  Age: 60 y.o. Sex: male    DOA: 1/15/2023 LOS:  LOS: 0 days        DOA: 1/15/2023        Assessment/Plan     Active Problems:    STEMI (ST elevation myocardial infarction) ([facility] [address] (1/15/2023)      Overview: Added automatically from request for surgery [id number]        Patient Active Problem List   Diagnosis Code    Hyperosmolar (nonketotic) coma (HCC) E11.01    Type II or unspecified type diabetes mellitus without mention of complication, uncontrolled [id number]    Hypokalemia E87.6    Essential hypertension, benign I10    STEMI (ST elevation myocardial infarction) ([facility] [address] I21.3         Plan:  STEMI status post cardiac catheterization at [facility], patient transferred to [facility] for evaluation by CT surgery for possible CABG. Continue heparin GTT, continue statin therapy. Chronic smoker-patient has been counseled to stop smoking  Alcohol abuse-patient mentions he drinks a sixpack daily, will place on CIWA protocol  History of hypertension-was hypotensive at [facility] on arrival and was placed on a Levophed drip which is currently off, will hold on blood pressure medications at this time. GERD-continue Protonix  History of THC use-patient has been counseled to stop  History of type 2 diabetes-continue insulin sliding scale, monitor blood sugars  DVT prophylaxis-Heparin GTT  Full code          History of Presenting Illness:      69-year-old male with a past medical history of type 2 diabetes, hypertension, hyperlipidemia, chronic smoker, alcohol abuse presents to the emergency room secondary to chest pain that started last night. Patient mentions that chest pain initially relieved with rest however continued to get worse and he presented to the emergency room at 4:30 AM this morning. Code STEMI was called and patient was taken to the Cath Lab.   Per cardiology patient noted to have significant right coronary artery disease with calcification. Initial CAROLINE flow 0, post catheterization CAROLINE flow 3 however stent could not be placed secondary to heavy calcification. Patient was also noted to have significant left-sided coronary artery disease and recommended that patient be transferred to a higher level of care for evaluation for CABG. Patient is currently admitted to the hospital for further evaluation. Past Medical History:   Diagnosis Date    Anginal pain ([facility] [address]     Diabetes ([facility] [address]     new onset 2/2014    High cholesterol     Hypertension        No past surgical history on file. No family history on file. Social History     Socioeconomic History    Marital status:    Tobacco Use    Smoking status: Some Days   Vaping Use    Vaping Use: Never used   Substance and Sexual Activity    Alcohol use: No     Comment: drinks daily last at midnight    Drug use: Yes     Types: Marijuana       Prior to Admission medications    Medication Sig Start Date End Date Taking? Authorizing Provider   insulin lispro (HUMALOG) 100 unit/mL injection INITIATE INSULIN CORRECTIVE PROTOCOL: Normal Insulin Sensitivity   For Blood Sugar (mg/dL) of:     Less than 150 =   0 units           150 -199 =   2 units  200 -249 =   4 units  250 -299 =   6 units  300 -349 =   8 units  350 and above = 10 units and Call Physician  If 2 glucose readings are above 200 mg/dL within a 24 hr period, proceed to \"Insulin Resistant\" dosing. Initiate Hypoglycemia protocol if blood glucose is <70 mg/dL  Fast Acting - Administer Immediately - or within 15 minutes of start of meal, if mealtime coverage. 1/15/23   Rizwan Hardy MD   atorvastatin (LIPITOR) 80 mg tablet Take 1 Tablet by mouth daily. 1/15/23   Rizwan Hardy MD   nitroglycerin (NITROSTAT) 0.4 mg SL tablet 1 Tablet by SubLINGual route every five (5) minutes as needed for Chest Pain for up to 3 doses. Up to 3 doses.  1/15/23   Rizwan Hardy MD   heparin sodium,porcine/D5W (heparin 25,000 units in D5W 250 ml) 25,000 unit/250 mL(100 unit/mL) infusion 973.5-2,212.5 Units/hr by IntraVENous route TITRATE. 1/15/23   Ophelia Garcia MD   ramipril (ALTACE) 2.5 mg capsule Take 1 Cap by mouth daily. 3/1/14   Veda Muñoz MD   omeprazole (PRILOSEC) 20 mg capsule Take 1 Cap by mouth daily. 3/1/14   Veda Muñoz MD   Insulin Syringes, Disposable, 1 mL syrg As directed 3/1/14   Veda Muñoz MD   insulin glargine (LANTUS) 100 unit/mL injection 25 units sq qday  Patient not taking: Reported on 1/15/2023 3/1/14   Veda Muñoz MD   aspirin 81 mg chewable tablet Take 81 mg by mouth daily. Other, MD Laura   amLODIPine (NORVASC) 10 mg tablet Take 10 mg by mouth daily. Indications: HYPERTENSION    Provider, Historical   metoprolol tartrate (LOPRESSOR) 50 mg tablet Take 50 mg by mouth two (2) times a day. Indications: HYPERTENSION    Provider, Historical   pravastatin (PRAVACHOL) 40 mg tablet Take 40 mg by mouth nightly. Indications: HYPERCHOLESTEROLEMIA    Provider, Historical       No Known Allergies    Review of Systems:    Pertinent Positives noted in HPI. Rest all other ROS were noted to be negative. Physical Exam:      There were no vitals taken for this visit. Physical Exam:    Gen: In general, this is a well nourished male in no acute distress  HEENT: Sclerae nonicteric. Oral mucous membranes moist. Dentition poor  Neck: Supple with midline trachea. CV: RRR without murmur or rub appreciated. Resp:Respirations are unlabored without use of accessory muscles. Lung fields B/L without wheezes or rhonchi. Abd: Soft, nontender, nondistended. Extrem: Extremities are warm, without cyanosis or clubbing. No pitting pretibial edema. Skin: Warm, no visible rashes. Neuro: Patient is alert, oriented, and cooperative. No obvious focal defects. Moves all 4 extremities.     Labs Reviewed:    Recent Results (from the past 24 hour(s))   EKG, 12 LEAD, INITIAL    Collection Time: 01/15/23  5:49 AM   Result Value Ref Range Ventricular Rate 55 BPM    Atrial Rate 55 BPM    P-R Interval 242 ms    QRS Duration 90 ms    Q-T Interval 376 ms    QTC Calculation (Bezet) 359 ms    Calculated P Axis 34 degrees    Calculated R Axis 43 degrees    Calculated T Axis 83 degrees    Diagnosis       Critical Test Result: STEMI  Sinus bradycardia with 1st degree AV block  Inferior infarct , new  Anterior infarct (cited on or before 15-JOHNNY-2023)  ACUTE MI / STEMI  Consider right ventricular involvement in acute inferior infarct  Abnormal ECG  When compared with ECG of 27-FEB-2014 00:57,  NC interval has increased  Acute Inferior infarct is now present     CBC WITH AUTOMATED DIFF    Collection Time: 01/15/23  6:11 AM   Result Value Ref Range    WBC 8.3 4.6 - 13.2 K/uL    RBC 4.87 4.35 - 5.65 M/uL    HGB 15.9 13.0 - 16.0 g/dL    HCT 43.6 36.0 - 48.0 %    MCV 89.5 78.0 - 100.0 FL    MCH 32.6 24.0 - 34.0 PG    MCHC 36.5 31.0 - 37.0 g/dL    RDW 12.3 11.6 - 14.5 %    PLATELET 939 880 - 697 K/uL    MPV 8.4 (L) 9.2 - 11.8 FL    NRBC 0.0 0  WBC    ABSOLUTE NRBC 0.00 0.00 - 0.01 K/uL    NEUTROPHILS 35 (L) 40 - 73 %    LYMPHOCYTES 51 21 - 52 %    MONOCYTES 10 3 - 10 %    EOSINOPHILS 3 0 - 5 %    BASOPHILS 1 0 - 2 %    IMMATURE GRANULOCYTES 0 0.0 - 0.5 %    ABS. NEUTROPHILS 2.9 1.8 - 8.0 K/UL    ABS. LYMPHOCYTES 4.3 (H) 0.9 - 3.6 K/UL    ABS. MONOCYTES 0.9 0.05 - 1.2 K/UL    ABS. EOSINOPHILS 0.3 0.0 - 0.4 K/UL    ABS. BASOPHILS 0.0 0.0 - 0.1 K/UL    ABS. IMM.  GRANS. 0.0 0.00 - 0.04 K/UL    DF AUTOMATED     TROPONIN-HIGH SENSITIVITY    Collection Time: 01/15/23  6:11 AM   Result Value Ref Range    Troponin-High Sensitivity 24 0 - 78 ng/L   METABOLIC PANEL, COMPREHENSIVE    Collection Time: 01/15/23  6:11 AM   Result Value Ref Range    Sodium 129 (L) 136 - 145 mmol/L    Potassium 3.8 3.5 - 5.5 mmol/L    Chloride 92 (L) 100 - 111 mmol/L    CO2 23 21 - 32 mmol/L    Anion gap 14 3.0 - 18 mmol/L    Glucose 128 (H) 74 - 99 mg/dL    BUN 4 (L) 7.0 - 18 MG/DL Creatinine 0.72 0.6 - 1.3 MG/DL    BUN/Creatinine ratio 6 (L) 12 - 20      eGFR >60 >60 ml/min/1.73m2    Calcium 10.0 8.5 - 10.1 MG/DL    Bilirubin, total 0.3 0.2 - 1.0 MG/DL    ALT (SGPT) 129 (H) 16 - 61 U/L    AST (SGOT) 74 (H) 10 - 38 U/L    Alk. phosphatase 109 45 - 117 U/L    Protein, total 7.8 6.4 - 8.2 g/dL    Albumin 4.0 3.4 - 5.0 g/dL    Globulin 3.8 2.0 - 4.0 g/dL    A-G Ratio 1.1 0.8 - 1.7     PROTHROMBIN TIME + INR    Collection Time: 01/15/23  6:11 AM   Result Value Ref Range    Prothrombin time 12.6 11.5 - 15.2 sec    INR 0.9 0.8 - 1.2     PTT    Collection Time: 01/15/23  6:11 AM   Result Value Ref Range    aPTT 26.3 23.0 - 36.4 SEC   POC ACTIVATED CLOTTING TIME    Collection Time: 01/15/23  7:18 AM   Result Value Ref Range    Activated Clotting Time (POC) 474 (H) 79 - 138 SECS   TROPONIN-HIGH SENSITIVITY    Collection Time: 01/15/23 10:12 AM   Result Value Ref Range    Troponin-High Sensitivity 3,096 (HH) 0 - 78 ng/L   CBC WITH AUTOMATED DIFF    Collection Time: 01/15/23 10:12 AM   Result Value Ref Range    WBC 8.4 4.6 - 13.2 K/uL    RBC 4.98 4.35 - 5.65 M/uL    HGB 16.3 (H) 13.0 - 16.0 g/dL    HCT 44.4 36.0 - 48.0 %    MCV 89.2 78.0 - 100.0 FL    MCH 32.7 24.0 - 34.0 PG    MCHC 36.7 31.0 - 37.0 g/dL    RDW 12.4 11.6 - 14.5 %    PLATELET 820 678 - 884 K/uL    MPV 8.6 (L) 9.2 - 11.8 FL    NRBC 0.0 0  WBC    ABSOLUTE NRBC 0.00 0.00 - 0.01 K/uL    NEUTROPHILS 76 (H) 40 - 73 %    LYMPHOCYTES 17 (L) 21 - 52 %    MONOCYTES 6 3 - 10 %    EOSINOPHILS 1 0 - 5 %    BASOPHILS 0 0 - 2 %    IMMATURE GRANULOCYTES 1 (H) 0.0 - 0.5 %    ABS. NEUTROPHILS 6.3 1.8 - 8.0 K/UL    ABS. LYMPHOCYTES 1.4 0.9 - 3.6 K/UL    ABS. MONOCYTES 0.5 0.05 - 1.2 K/UL    ABS. EOSINOPHILS 0.0 0.0 - 0.4 K/UL    ABS. BASOPHILS 0.0 0.0 - 0.1 K/UL    ABS. IMM.  GRANS. 0.0 0.00 - 0.04 K/UL    DF AUTOMATED     PTT    Collection Time: 01/15/23 10:12 AM   Result Value Ref Range    aPTT 56.1 (H) 23.0 - 36.4 SEC       Imaging Reviewed:    XR Results (most recent):  Results from Hospital Encounter encounter on 01/15/23    XR CHEST PORT    Narrative  EXAM: CHEST RADIOGRAPH    CLINICAL INDICATION/HISTORY: chest pain  > Additional: None    COMPARISON: None    TECHNIQUE: Portable frontal view of the chest    _______________    FINDINGS:    SUPPORT DEVICES: None. HEART AND MEDIASTINUM: Heart size is normal.    LUNGS AND PLEURAL SPACES: No focal consolidation, effusion, or pneumothorax. BONES AND SOFT TISSUES: Unremarkable.    _______________    Impression  No active cardiopulmonary disease. CT Results (most recent):  No results found for this or any previous visit. MRI Results (most recent):  No results found for this or any previous visit. Bere Alvarado MD  1/15/2023, 1:12 PM        Disclaimer: Sections of this note are dictated using utilizing voice recognition software. Minor typographical errors may be present. If questions arise, please do not hesitate to contact me or call our department.

## 2023-01-15 NOTE — Clinical Note
TRANSFER - IN REPORT:     Verbal report received from: ED RN. Report consisted of patient's Situation, Background, Assessment and   Recommendations(SBAR). Opportunity for questions and clarification was provided. Assessment completed upon patient's arrival to unit and care assumed. Patient transported with a Registered Nurse and [address] [place] [address] / [facility] [employer].

## 2023-01-15 NOTE — CONSULTS
[facility]  Pulmonary, Critical Care, and Sleep Medicine    Name: John Gagnon MRN: [id number]   : 1960 Hospital: [facility] [place]    Date: 1/15/2023  Room: [address] [place] [address] Note                                              Consult requesting physician: Dr. Jay Bustamante  Reason for Consult: STEMI    IMPRESSION:       Active Hospital Problems    Diagnosis Date Noted    STEMI (ST elevation myocardial infarction) ([facility] 01/15/2023     Added automatically from request for surgery [id number]      Essential hypertension, benign 2014    Hyperosmolar (nonketotic) coma ([facility] 2014        Patient Active Problem List   Diagnosis Code    Hyperosmolar (nonketotic) coma ([facility] [address] E11.01    Type II or unspecified type diabetes mellitus without mention of complication, uncontrolled [id number]    Hypokalemia E87.6    Essential hypertension, benign I10    STEMI (ST elevation myocardial infarction) ([facility] [address] I21.3         Code status: Full Code       RECOMMENDATIONS:     Respiratory:   No acute issues. Protecting airway. Active smoker; 1/3 PPD since more than 25 years. Advised to quit smoking. Recommended outpatient pulmonary work-up and evaluation after discharge. Keep SPO2 >=92%. HOB 30 degree elevation all the time. Aggressive pulmonary toileting. Aspiration precautions. Incentive spirometry. CVS: History of HTN, admitted with NSTEMI, s/p RCA balloon angioplasty. Triple-vessel disease; going for CABG evaluation at [facility]. Continue Integrilin. Continue aspirin, Lipitor. Continue cardiac medication with Dr. Jensen Gonzales. Discussed with Dr. Jensen Gonzales. ID: No acute issues. Hematology/Oncology: Normal hemoglobin and platelets. Monitor. Renal: Normal creatinine. Hypokalemia replaced. Hyponatremia; monitor closely. GI/: Mildly elevated ALT and AST; continue to monitor. Endocrine: Monitor BS. SSI. Neurology: Alert awake oriented x3. No focal deficit.     Psychiatry: No acute issues. Toxicology: Advised to quit smoking. Pain/Sedation: Chest pain significantly improved since after cardiac cath. Skin/Wound: No acute issues. Electrolytes: Replace electrolytes per ICU electrolyte replacement protocol. IVF: None    Nutrition: N.p.o.    Prophylaxis: DVT Prophylaxis: SCD. GI Prophylaxis: Protonix. Restraints: none    Lines/Tubes: PIV  Patient does not have central line or Kim catheter. Will defer respective systems problem management to primary and other respective consultant and follow patient in ICU with primary and other medical team.  Further recommendations will be based on the patient's response to recommended treatment and results of the investigation ordered. Quality Care: PPI, DVT prophylaxis, HOB elevated, Infection control all reviewed and addressed. Care of plan d/w RN, RT, MDR, Dr. Melissa Adams  D/w patient, family-wife and daughter at bedside (answered all questions to satisfaction). High complexity decision making was performed during the evaluation of this patient at high risk for decompensation with multiple organ involvement. Total critical care time spent rendering care exclusive of procedures/family discussion/coordination of care: 35 minutes. Subjective/History of Present Illness:     Patient is a 60 y.o. male with PMHx significant for smoker, HTN, DM; came to ER with chest pain; diagnosed to have NSTEMI; underwent cardiac catheterization and RCA balloon angioplasty; due to triple-vessel disease, patient is kept on Integrilin and will be transferred to [facility] for CABG. 1/15/2023 :     Seen in ICU room 101. Chest pain has significantly improved; now very mild precordial chest pain. Denied lightheadedness, dizziness, nausea, vomiting, dyspnea, cough, diaphoresis. Right wrist under immobilizer; no bleeding or hematoma.       I/O last 24 hrs: No intake or output data in the 24 hours ending 01/15/23 1012      The patient is critically ill History taken from patient, EMR     Review of Systems:  HEENT: No epistaxis, no nasal drainage, no difficulty in swallowing, no redness in eyes  Respiratory: as above  Cardiovascular: no palpitations, no chronic leg edema, no syncope  Gastrointestinal: no abd pain, no vomiting, no diarrhea, no bleeding symptoms  Genitourinary: No urinary symptoms or hematuria  Musculoskeletal: Neg  Neurological: No focal weakness, no seizures, no headaches  Behvioral/Psych: No anxiety, no depression  Constitutional: No fever, no chills, no weight loss, no night sweats     No Known Allergies   Past Medical History:   Diagnosis Date    Anginal pain ([facility]     Diabetes ([facility]     new onset 2/2014    High cholesterol     Hypertension       History reviewed. No pertinent surgical history. Social History     Tobacco Use    Smoking status: Some Days    Smokeless tobacco: Not on file   Substance Use Topics    Alcohol use: No     Comment: drinks daily last at midnight      History reviewed. No pertinent family history. Prior to Admission medications    Medication Sig Start Date End Date Taking? Authorizing Provider   insulin lispro (HUMALOG) 100 unit/mL injection INITIATE INSULIN CORRECTIVE PROTOCOL: Normal Insulin Sensitivity   For Blood Sugar (mg/dL) of:     Less than 150 =   0 units           150 -199 =   2 units  200 -249 =   4 units  250 -299 =   6 units  300 -349 =   8 units  350 and above = 10 units and Call Physician  If 2 glucose readings are above 200 mg/dL within a 24 hr period, proceed to \"Insulin Resistant\" dosing. Initiate Hypoglycemia protocol if blood glucose is <70 mg/dL  Fast Acting - Administer Immediately - or within 15 minutes of start of meal, if mealtime coverage. 1/15/23  Yes Neno Mchugh MD   atorvastatin (LIPITOR) 80 mg tablet Take 1 Tablet by mouth daily.  1/15/23  Yes Neno Mchugh MD   nitroglycerin (NITROSTAT) 0.4 mg SL tablet 1 Tablet by SubLINGual route every five (5) minutes as needed for Chest Pain for up to 3 doses. Up to 3 doses. 1/15/23  Yes Hitesh Farris MD   heparin sodium,porcine/D5W (heparin 25,000 units in D5W 250 ml) 25,000 unit/250 mL(100 unit/mL) infusion 973.5-2,212.5 Units/hr by IntraVENous route TITRATE. 1/15/23  Yes Hitesh Farris MD   aspirin 81 mg chewable tablet Take 81 mg by mouth daily. Yes Other, MD Laura   amLODIPine (NORVASC) 10 mg tablet Take 10 mg by mouth daily. Indications: HYPERTENSION   Yes Provider, Historical   metoprolol tartrate (LOPRESSOR) 50 mg tablet Take 50 mg by mouth two (2) times a day. Indications: HYPERTENSION   Yes Provider, Historical   ramipril (ALTACE) 2.5 mg capsule Take 1 Cap by mouth daily. 3/1/14   Kevin Brenner MD   omeprazole (PRILOSEC) 20 mg capsule Take 1 Cap by mouth daily. 3/1/14   Kevin Brenner MD   Insulin Syringes, Disposable, 1 mL syrg As directed 3/1/14   Kevin Brenner MD   insulin glargine (LANTUS) 100 unit/mL injection 25 units sq qday  Patient not taking: Reported on 1/15/2023 3/1/14   Kevin Brenner MD   insulin lispro (HUMALOG) 100 unit/mL injection 8 units tid 3/1/14   Kevin Brenner MD   pravastatin (PRAVACHOL) 40 mg tablet Take 40 mg by mouth nightly.  Indications: HYPERCHOLESTEROLEMIA    Provider, Historical     Current Facility-Administered Medications   Medication Dose Route Frequency    ondansetron (ZOFRAN) 4 mg/2 mL injection        sodium chloride (NS) flush 5-40 mL  5-40 mL IntraVENous Q8H    [START ON 1/16/2023] aspirin chewable tablet 81 mg  81 mg Oral DAILY    atorvastatin (LIPITOR) tablet 80 mg  80 mg Oral DAILY    eptifibatide (INTEGRILIN) 0.75 mg/mL infusion  2 mcg/kg/min IntraVENous CONTINUOUS    heparin 25,000 units in D5W 250 ml infusion  11-25 Units/kg/hr IntraVENous TITRATE    insulin lispro (HUMALOG) injection   SubCUTAneous Q6H         Objective:   Vital Signs:    Visit Vitals  BP (!) 162/90 (BP 1 Location: Right upper arm, BP Patient Position: At rest)   Pulse (!) 59   Temp 98.2 °F (36.8 °C)   Resp 18   Ht 6' 1\" (1.854 m)   Wt 88.5 kg (195 lb)   SpO2 100%   BMI 25.73 kg/m²       O2 Device: Nasal cannula   O2 Flow Rate (L/min): 4 l/min   Temp (24hrs), Av.2 °F (36.8 °C), Min:98.2 °F (36.8 °C), Max:98.2 °F (36.8 °C)       Intake/Output:   Last shift:      No intake/output data recorded. Last 3 shifts: No intake/output data recorded. No intake or output data in the 24 hours ending 01/15/23 1012    Last 3 Recorded Weights in this Encounter    01/15/23 0600   Weight: 88.5 kg (195 lb)       Physical Exam:     General/Neurology: Alert, Awake, NAD. O2 NC. Head:   Normocephalic, without obvious abnormality, atraumatic. Eye:   EOM intact. No scleral icterus, no pallor, no cyanosis. Nose:   No sinus tenderness  Throat:  Lips, mucosa, and tongue normal. No oral thrush. Neck:   Supple, symmetric. No lymphadenopathy. Trachea midline  Lung: Moderate air entry bilateral equal. No rales. No rhonchi. No wheezing. No stridors. No prolongded expiration. No accessory muscle use. Heart:   Regular rate & rhythm. S1 S2 present. No murmur. No JVD. Abdomen:  Soft. NT. ND. +BS. No masses. Extremities:  No pedal edema. No cyanosis. No clubbing. Pulses: 2+ and symmetric in DP. Capillary refill: normal  Lymphatic:  No cervical or supraclavicular palpable lymphadenopathy. Musculoskeletal: No joint swelling. No tenderness. Right wrist cardiac cath site under immobilizer. Skin:   Color, texture, turgor normal. No rashes or lesions. Data:             Chemistry Recent Labs     01/15/23  0611   *   *   K 3.8   CL 92*   CO2 23   BUN 4*   CREA 0.72   CA 10.0   AGAP 14   BUCR 6*      TP 7.8   ALB 4.0   GLOB 3.8   AGRAT 1.1        Lactic Acid No results found for: LAC  No results for input(s): LAC in the last 72 hours.      Liver Enzymes Protein, total   Date Value Ref Range Status   01/15/2023 7.8 6.4 - 8.2 g/dL Final     Albumin   Date Value Ref Range Status   01/15/2023 4.0 3.4 - 5.0 g/dL Final     Globulin   Date Value Ref Range Status   01/15/2023 3.8 2.0 - 4.0 g/dL Final     A-G Ratio   Date Value Ref Range Status   01/15/2023 1.1 0.8 - 1.7   Final     Alk. phosphatase   Date Value Ref Range Status   01/15/2023 109 45 - 117 U/L Final     Recent Labs     01/15/23  0611   TP 7.8   ALB 4.0   GLOB 3.8   AGRAT 1.1           CBC w/Diff Recent Labs     01/15/23  0611   WBC 8.3   RBC 4.87   HGB 15.9   HCT 43.6      GRANS 35*   LYMPH 51   EOS 3        Cardiac Enzymes No results found for: CPK, CK, CKMMB, CKMB, RCK3, CKMBT, CKNDX, CKND1, YARELIS, TROPT, TROIQ, ARIELA, TROPT, TNIPOC, BNP, BNPP     BNP No results found for: BNP, BNPP, XBNPT     Coagulation Recent Labs     01/15/23  0611   PTP 12.6   INR 0.9   APTT 26.3         Thyroid  No results found for: T4, T3U, TSH, TSHEXT    No results found for: T4     Urinalysis Lab Results   Component Value Date/Time    Color STRAW 02/27/2014 12:16 AM    Appearance CLEAR 02/27/2014 12:16 AM    Specific gravity <1.005 02/27/2014 12:16 AM    pH (UA) 5.5 02/27/2014 12:16 AM    Protein NEGATIVE 02/27/2014 12:16 AM    Glucose >1,000 (A) 02/27/2014 12:16 AM    Ketone 15 (A) 02/27/2014 12:16 AM    Bilirubin NEGATIVE 02/27/2014 12:16 AM    Urobilinogen 0.2 02/27/2014 12:16 AM    Nitrites NEGATIVE 02/27/2014 12:16 AM    Leukocyte Esterase NEGATIVE 02/27/2014 12:16 AM        Micro  No results for input(s): SDES, CULT in the last 72 hours. No results for input(s): CULT in the last 72 hours. Culture data during this hospitalization. All Micro Results       None                 Images report reviewed by me:  CT (Most Recent) (CT chest reviewed by me) No results found for this or any previous visit. CXR reviewed by me:  XR (Most Recent). CXR  reviewed by me and compared with previous CXR No results found for this or any previous visit.          ·Please note: Voice-recognition software may have been used to generate this report, which may have resulted in some phonetic-based errors in grammar and contents. Even though attempts were made to correct all the mistakes, some may have been missed, and remained in the body of the document.       Jasmeet Shields MD  1/15/2023

## 2023-01-15 NOTE — DISCHARGE SUMMARY
Discharge Summary    Patient: Mariaelena Harden MRN: [id number]  CSN: [id number]    YOB: 1960  Age: 60 y.o. Sex: male    DOA: 1/15/2023 LOS:  LOS: 0 days   Discharge Date:      Primary Care Provider:  Siomara Casarez MD    Admission Diagnoses: STEMI (ST elevation myocardial infarction) [facility]) [I21.3]    Discharge Diagnoses:    Hospital Problems  Date Reviewed: 2/28/2014            Codes Class Noted POA    * (Principal) [facility] ([facility] ICD-10-CM: I21.3  ICD-9-CM: 410.90  1/15/2023 Unknown    Overview Signed 1/15/2023  6:26 AM by Gwendolyn Thomason     Added automatically from request for surgery [id number]             Essential hypertension, benign ICD-10-CM: I10  ICD-9-CM: 401.1  2/28/2014 Yes        Hyperosmolar (nonketotic) coma ([facility] ICD-10-CM: E11.01  ICD-9-CM: 250.20  2/27/2014 Yes           Discharge Condition: stable     Discharge Medications:     Current Discharge Medication List        START taking these medications    Details   atorvastatin (LIPITOR) 80 mg tablet Take 1 Tablet by mouth daily. Qty: 30 Tablet, Refills: 0  Start date: 1/15/2023      nitroglycerin (NITROSTAT) 0.4 mg SL tablet 1 Tablet by SubLINGual route every five (5) minutes as needed for Chest Pain for up to 3 doses. Up to 3 doses. Qty: 3 Each, Refills: 0  Start date: 1/15/2023      heparin sodium,porcine/D5W (heparin 25,000 units in D5W 250 ml) 25,000 unit/250 mL(100 unit/mL) infusion 973.5-2,212.5 Units/hr by IntraVENous route TITRATE.   Qty: 100 mL, Refills: 0  Start date: 1/15/2023           CONTINUE these medications which have CHANGED    Details   insulin lispro (HUMALOG) 100 unit/mL injection INITIATE INSULIN CORRECTIVE PROTOCOL: Normal Insulin Sensitivity   For Blood Sugar (mg/dL) of:     Less than 150 =   0 units           150 -199 =   2 units  200 -249 =   4 units  250 -299 =   6 units  300 -349 =   8 units  350 and above = 10 units and Call Physician  If 2 glucose readings are above 200 mg/dL within a 24 hr period, proceed to \"Insulin Resistant\" dosing. Initiate Hypoglycemia protocol if blood glucose is <70 mg/dL  Fast Acting - Administer Immediately - or within 15 minutes of start of meal, if mealtime coverage. Qty: 1 Each, Refills: 0  Start date: 1/15/2023           CONTINUE these medications which have NOT CHANGED    Details   aspirin 81 mg chewable tablet Take 81 mg by mouth daily. Procedures : left heart cath     Consults: Cardiology, pulmonologist -icu attending       PHYSICAL EXAM   Visit Vitals  BP (!) 162/90 (BP 1 Location: Right upper arm, BP Patient Position: At rest)   Pulse (!) 59   Temp 98.2 °F (36.8 °C)   Resp 18   Ht 6' 1\" (1.854 m)   Wt 88.5 kg (195 lb)   SpO2 100%   BMI 25.73 kg/m²     General: Awake, cooperative, no acute distress    HEENT: NC, Atraumatic. PERRLA, EOMI. Anicteric sclerae. Lungs:  CTA Bilaterally. No Wheezing/Rhonchi/Rales. Heart:  Regular  rhythm,  No murmur, No Rubs, No Gallops  Abdomen: Soft, Non distended, Non tender. +Bowel sounds,   Extremities: No c/c/e  Psych:   Not anxious or agitated. Neurologic:  No acute neurological deficits. Admission HPI :   Sixto Snyder is a 60 y.o. male with history of diabetes, hypertension, hyperlipidemia presented to ER due to chest pain started at midnight. The chest pain is sharp having pain, located on the middle of the chest no radiation. EKG indicated STEMI. Code STEMI is called in ER. Patient Was Sent to Cath Lab directly from ER. Hospital Course :   Pt was admitted to the icu after the left heart cath. He received aspirin and heparin gtt before going to the cath. Recommend to have CABG. Dr. Ruthann Haley discussed [facility] Dr. Yosvany Rangel who have kindly accepted the patient for CABG and also Dr. Maral Feliciano hospitalist accepted the pt on his service. He received integrillin during the procedure.  Dr. Ruthann Haley recommend to start  heparin gtt around 15 PM.     Pt and wife agree with the plan. Activity: Bedrest    Diet: npo    Follow-up: per [facility] physician recommendation     Disposition: transfer to [facility]    Minutes spent on discharge: 45 min       Labs: Results:       Chemistry Recent Labs     01/15/23  0611   *   *   K 3.8   CL 92*   CO2 23   BUN 4*   CREA 0.72   CA 10.0   AGAP 14   BUCR 6*      TP 7.8   ALB 4.0   GLOB 3.8   AGRAT 1.1      CBC w/Diff Recent Labs     01/15/23  0611   WBC 8.3   RBC 4.87   HGB 15.9   HCT 43.6      GRANS 35*   LYMPH 51   EOS 3      Cardiac Enzymes No results for input(s): CPK, CKND1, YARELIS in the last 72 hours. No lab exists for component: CKRMB, TROIP   Coagulation Recent Labs     01/15/23  0611   PTP 12.6   INR 0.9   APTT 26.3       Lipid Panel Lab Results   Component Value Date/Time    Cholesterol, total 140 03/01/2014 04:28 AM    HDL Cholesterol 27 (L) 03/01/2014 04:28 AM    LDL, calculated 74.2 03/01/2014 04:28 AM    VLDL, calculated 38.8 03/01/2014 04:28 AM    Triglyceride 194 (H) 03/01/2014 04:28 AM    CHOL/HDL Ratio 5.2 (H) 03/01/2014 04:28 AM      BNP No results for input(s): BNPP in the last 72 hours. Liver Enzymes Recent Labs     01/15/23  0611   TP 7.8   ALB 4.0         Thyroid Studies No results found for: T4, T3U, TSH, TSHEXT         Significant Diagnostic Studies: No results found.           [facility]     CC: Art Fuentes MD

## 2023-01-16 ENCOUNTER — ANESTHESIA (OUTPATIENT)
Dept: CARDIOTHORACIC SURGERY | Age: 63
DRG: 236 | End: 2023-01-16
Payer: OTHER GOVERNMENT

## 2023-01-16 ENCOUNTER — APPOINTMENT (OUTPATIENT)
Dept: GENERAL RADIOLOGY | Age: 63
DRG: 235 | End: 2023-01-16
Attending: PHYSICIAN ASSISTANT
Payer: OTHER GOVERNMENT

## 2023-01-16 ENCOUNTER — ANESTHESIA EVENT (OUTPATIENT)
Dept: CARDIOTHORACIC SURGERY | Age: 63
DRG: 236 | End: 2023-01-16
Payer: OTHER GOVERNMENT

## 2023-01-16 ENCOUNTER — HOSPITAL ENCOUNTER (INPATIENT)
Dept: LAB | Age: 63
Discharge: HOME | DRG: 235 | End: 2023-01-16
Payer: OTHER GOVERNMENT

## 2023-01-16 ENCOUNTER — APPOINTMENT (OUTPATIENT)
Dept: NON INVASIVE DIAGNOSTICS | Age: 63
DRG: 235 | End: 2023-01-16
Attending: PHYSICIAN ASSISTANT
Payer: OTHER GOVERNMENT

## 2023-01-16 LAB
ALBUMIN SERPL-MCNC: 3.3 G/DL (ref 3.4–5)
ALBUMIN SERPL-MCNC: 3.7 G/DL (ref 3.4–5)
ALBUMIN/GLOB SERPL: 0.9 (ref 0.8–1.7)
ALBUMIN/GLOB SERPL: 1.5 (ref 0.8–1.7)
ALP SERPL-CCNC: 61 U/L (ref 45–117)
ALP SERPL-CCNC: 87 U/L (ref 45–117)
ALT SERPL-CCNC: 102 U/L (ref 16–61)
ALT SERPL-CCNC: 62 U/L (ref 16–61)
ANION GAP BLD CALC-SCNC: 13 (ref 10–20)
ANION GAP BLD CALC-SCNC: 14 (ref 10–20)
ANION GAP BLD CALC-SCNC: 15 (ref 10–20)
ANION GAP BLD CALC-SCNC: 16 (ref 10–20)
ANION GAP BLD CALC-SCNC: 17 (ref 10–20)
ANION GAP SERPL CALC-SCNC: 6 MMOL/L (ref 3–18)
ANION GAP SERPL CALC-SCNC: 9 MMOL/L (ref 3–18)
APTT PPP: 32.2 SEC (ref 23–36.4)
AST SERPL-CCNC: 89 U/L (ref 10–38)
AST SERPL-CCNC: 91 U/L (ref 10–38)
ATRIAL RATE: 83 BPM
BASE DEFICIT BLD-SCNC: 0.4 MMOL/L
BASE DEFICIT BLD-SCNC: 0.6 MMOL/L
BASE DEFICIT BLD-SCNC: 0.8 MMOL/L
BASE DEFICIT BLD-SCNC: 1.1 MMOL/L
BASE EXCESS BLD CALC-SCNC: 0 MMOL/L
BASE EXCESS BLD CALC-SCNC: 0 MMOL/L
BASE EXCESS BLD CALC-SCNC: 0.2 MMOL/L
BASE EXCESS BLD CALC-SCNC: 0.4 MMOL/L
BASE EXCESS BLD CALC-SCNC: 0.4 MMOL/L
BASE EXCESS BLD CALC-SCNC: 1.1 MMOL/L
BASE EXCESS BLD CALC-SCNC: 1.2 MMOL/L
BASOPHILS # BLD: 0 K/UL (ref 0–0.1)
BASOPHILS # BLD: 0 K/UL (ref 0–0.1)
BASOPHILS NFR BLD: 0 % (ref 0–2)
BASOPHILS NFR BLD: 0 % (ref 0–2)
BDY SITE: ABNORMAL
BILIRUB DIRECT SERPL-MCNC: 0.4 MG/DL (ref 0–0.2)
BILIRUB SERPL-MCNC: 1 MG/DL (ref 0.2–1)
BILIRUB SERPL-MCNC: 1.1 MG/DL (ref 0.2–1)
BUN SERPL-MCNC: 5 MG/DL (ref 7–18)
BUN SERPL-MCNC: 5 MG/DL (ref 7–18)
BUN/CREAT SERPL: 6 (ref 12–20)
BUN/CREAT SERPL: 7 (ref 12–20)
CA-I BLD-MCNC: 1.05 MMOL/L (ref 1.12–1.32)
CA-I BLD-MCNC: 1.12 MMOL/L (ref 1.12–1.32)
CA-I BLD-MCNC: 1.13 MMOL/L (ref 1.12–1.32)
CA-I BLD-MCNC: 1.2 MMOL/L (ref 1.12–1.32)
CA-I BLD-MCNC: 1.21 MMOL/L (ref 1.12–1.32)
CA-I SERPL-SCNC: 1.18 MMOL/L (ref 1.15–1.33)
CALCIUM SERPL-MCNC: 8.8 MG/DL (ref 8.5–10.1)
CALCIUM SERPL-MCNC: 9.7 MG/DL (ref 8.5–10.1)
CALCULATED P AXIS, ECG09: 17 DEGREES
CALCULATED R AXIS, ECG10: -30 DEGREES
CALCULATED T AXIS, ECG11: -52 DEGREES
CHLORIDE BLD-SCNC: 87 MMOL/L (ref 98–107)
CHLORIDE BLD-SCNC: 91 MMOL/L (ref 98–107)
CHLORIDE BLD-SCNC: 92 MMOL/L (ref 98–107)
CHLORIDE BLD-SCNC: 94 MMOL/L (ref 98–107)
CHLORIDE BLD-SCNC: 94 MMOL/L (ref 98–107)
CHLORIDE BLD-SCNC: 97 MMOL/L (ref 98–107)
CHLORIDE BLD-SCNC: 97 MMOL/L (ref 98–107)
CHLORIDE SERPL-SCNC: 102 MMOL/L (ref 100–111)
CHLORIDE SERPL-SCNC: 96 MMOL/L (ref 100–111)
CO2 BLD-SCNC: 23 MMOL/L (ref 19–24)
CO2 BLD-SCNC: 24 MMOL/L (ref 19–24)
CO2 BLD-SCNC: 25 MMOL/L (ref 19–24)
CO2 BLD-SCNC: 26 MMOL/L (ref 19–24)
CO2 SERPL-SCNC: 26 MMOL/L (ref 21–32)
CO2 SERPL-SCNC: 27 MMOL/L (ref 21–32)
CREAT BLD-MCNC: 0.68 MG/DL (ref 0.6–1.3)
CREAT BLD-MCNC: 0.69 MG/DL (ref 0.6–1.3)
CREAT BLD-MCNC: 0.69 MG/DL (ref 0.6–1.3)
CREAT BLD-MCNC: 0.7 MG/DL (ref 0.6–1.3)
CREAT BLD-MCNC: 0.73 MG/DL (ref 0.6–1.3)
CREAT BLD-MCNC: 0.75 MG/DL (ref 0.6–1.3)
CREAT BLD-MCNC: 0.76 MG/DL (ref 0.6–1.3)
CREAT SERPL-MCNC: 0.67 MG/DL (ref 0.6–1.3)
CREAT SERPL-MCNC: 0.81 MG/DL (ref 0.6–1.3)
DIAGNOSIS, 93000: NORMAL
DIFFERENTIAL METHOD BLD: ABNORMAL
DIFFERENTIAL METHOD BLD: ABNORMAL
ECHO AO ROOT DIAM: 2.5 CM
ECHO AO ROOT INDEX: 1.17 CM/M2
ECHO LV INTERNAL DIMENSION DIASTOLE INDEX: 1.88 CM/M2
ECHO LV INTERNAL DIMENSION DIASTOLIC: 4 CM (ref 4.2–5.9)
ECHO LV IVSD: 1.4 CM (ref 0.6–1)
ECHO LV MASS 2D: 155.4 G (ref 88–224)
ECHO LV MASS INDEX 2D: 73 G/M2 (ref 49–115)
ECHO LV POSTERIOR WALL DIASTOLIC: 0.9 CM (ref 0.6–1)
ECHO LV RELATIVE WALL THICKNESS RATIO: 0.45
ECHO LVOT AREA: 4.5 CM2
ECHO LVOT DIAM: 2.4 CM
ECHO RV LONGITUDINAL DIMENSION: 5.2 CM
ECHO RV TAPSE: 1.9 CM (ref 1.7–?)
EOSINOPHIL # BLD: 0.1 K/UL (ref 0–0.4)
EOSINOPHIL # BLD: 0.1 K/UL (ref 0–0.4)
EOSINOPHIL NFR BLD: 1 % (ref 0–5)
EOSINOPHIL NFR BLD: 1 % (ref 0–5)
ERYTHROCYTE [DISTWIDTH] IN BLOOD BY AUTOMATED COUNT: 12.1 % (ref 11.6–14.5)
ERYTHROCYTE [DISTWIDTH] IN BLOOD BY AUTOMATED COUNT: 12.5 % (ref 11.6–14.5)
GAS FLOW.O2 O2 DELIVERY SYS: ABNORMAL
GAS FLOW.O2 O2 DELIVERY SYS: NORMAL
GAS FLOW.O2 SETTING OXYMISER: 12 BPM
GAS FLOW.O2 SETTING OXYMISER: 12 BPM
GAS FLOW.O2 SETTING OXYMISER: 15 BPM
GAS FLOW.O2 SETTING OXYMISER: 18 BPM
GLOBULIN SER CALC-MCNC: 2.2 G/DL (ref 2–4)
GLOBULIN SER CALC-MCNC: 4 G/DL (ref 2–4)
GLUCOSE BLD STRIP.AUTO-MCNC: 107 MG/DL (ref 70–110)
GLUCOSE BLD STRIP.AUTO-MCNC: 109 MG/DL (ref 70–110)
GLUCOSE BLD STRIP.AUTO-MCNC: 116 MG/DL (ref 70–110)
GLUCOSE BLD STRIP.AUTO-MCNC: 121 MG/DL (ref 70–110)
GLUCOSE BLD STRIP.AUTO-MCNC: 125 MG/DL (ref 70–110)
GLUCOSE BLD STRIP.AUTO-MCNC: 135 MG/DL (ref 70–110)
GLUCOSE BLD STRIP.AUTO-MCNC: 139 MG/DL (ref 70–110)
GLUCOSE BLD STRIP.AUTO-MCNC: 147 MG/DL (ref 70–110)
GLUCOSE BLD-MCNC: 106 MG/DL (ref 65–100)
GLUCOSE BLD-MCNC: 111 MG/DL (ref 65–100)
GLUCOSE BLD-MCNC: 111 MG/DL (ref 65–100)
GLUCOSE BLD-MCNC: 120 MG/DL (ref 65–100)
GLUCOSE BLD-MCNC: 92 MG/DL (ref 65–100)
GLUCOSE BLD-MCNC: 95 MG/DL (ref 65–100)
GLUCOSE BLD-MCNC: 96 MG/DL (ref 65–100)
GLUCOSE SERPL-MCNC: 104 MG/DL (ref 74–99)
GLUCOSE SERPL-MCNC: 119 MG/DL (ref 74–99)
HCO3 BLD-SCNC: 24.3 MMOL/L (ref 22–26)
HCO3 BLD-SCNC: 24.5 MMOL/L (ref 22–26)
HCO3 BLD-SCNC: 24.5 MMOL/L (ref 22–26)
HCO3 BLD-SCNC: 24.7 MMOL/L (ref 22–26)
HCO3 BLD-SCNC: 24.8 MMOL/L (ref 22–26)
HCO3 BLD-SCNC: 25.2 MMOL/L (ref 22–26)
HCO3 BLD-SCNC: 25.3 MMOL/L (ref 22–26)
HCO3 BLD-SCNC: 25.4 MMOL/L (ref 22–26)
HCO3 BLD-SCNC: 25.5 MMOL/L (ref 22–26)
HCO3 BLD-SCNC: 25.7 MMOL/L (ref 22–26)
HCO3 BLD-SCNC: 26.9 MMOL/L (ref 22–26)
HCT VFR BLD AUTO: 29.7 % (ref 36–48)
HCT VFR BLD AUTO: 42 % (ref 36–48)
HGB BLD-MCNC: 11.2 G/DL (ref 13–16)
HGB BLD-MCNC: 15.9 G/DL (ref 13–16)
HIV1 P24 AG SERPL QL IA: NONREACTIVE
HIV1+2 AB SERPL QL IA: NONREACTIVE
IMM GRANULOCYTES # BLD AUTO: 0 K/UL (ref 0–0.04)
IMM GRANULOCYTES # BLD AUTO: 0.1 K/UL (ref 0–0.04)
IMM GRANULOCYTES NFR BLD AUTO: 0 % (ref 0–0.5)
IMM GRANULOCYTES NFR BLD AUTO: 1 % (ref 0–0.5)
INR PPP: 1.4 (ref 0.8–1.2)
LACTATE BLD-SCNC: 1.31 MMOL/L (ref 0.4–2)
LACTATE BLD-SCNC: 1.74 MMOL/L (ref 0.4–2)
LACTATE BLD-SCNC: 1.84 MMOL/L (ref 0.4–2)
LACTATE BLD-SCNC: 1.92 MMOL/L (ref 0.4–2)
LACTATE BLD-SCNC: 1.98 MMOL/L (ref 0.4–2)
LACTATE BLD-SCNC: 2.34 MMOL/L (ref 0.4–2)
LACTATE BLD-SCNC: 2.35 MMOL/L (ref 0.4–2)
LACTATE BLD-SCNC: 2.54 MMOL/L (ref 0.4–2)
LACTATE BLD-SCNC: 2.9 MMOL/L (ref 0.4–2)
LACTATE BLD-SCNC: 2.95 MMOL/L (ref 0.4–2)
LACTATE BLD-SCNC: 3.08 MMOL/L (ref 0.4–2)
LACTATE SERPL-SCNC: 3.2 MMOL/L (ref 0.4–2)
LYMPHOCYTES # BLD: 1.5 K/UL (ref 0.9–3.6)
LYMPHOCYTES # BLD: 1.6 K/UL (ref 0.9–3.6)
LYMPHOCYTES NFR BLD: 15 % (ref 21–52)
LYMPHOCYTES NFR BLD: 23 % (ref 21–52)
MAGNESIUM SERPL-MCNC: 2.6 MG/DL (ref 1.6–2.6)
MCH RBC QN AUTO: 33.1 PG (ref 24–34)
MCH RBC QN AUTO: 33.1 PG (ref 24–34)
MCHC RBC AUTO-ENTMCNC: 37.7 G/DL (ref 31–37)
MCHC RBC AUTO-ENTMCNC: 37.9 G/DL (ref 31–37)
MCV RBC AUTO: 87.3 FL (ref 78–100)
MCV RBC AUTO: 87.9 FL (ref 78–100)
MONOCYTES # BLD: 0.7 K/UL (ref 0.05–1.2)
MONOCYTES # BLD: 0.9 K/UL (ref 0.05–1.2)
MONOCYTES NFR BLD: 12 % (ref 3–10)
MONOCYTES NFR BLD: 7 % (ref 3–10)
NEUTS SEG # BLD: 4.5 K/UL (ref 1.8–8)
NEUTS SEG # BLD: 7.7 K/UL (ref 1.8–8)
NEUTS SEG NFR BLD: 63 % (ref 40–73)
NEUTS SEG NFR BLD: 77 % (ref 40–73)
NRBC # BLD: 0 K/UL (ref 0–0.01)
NRBC # BLD: 0 K/UL (ref 0–0.01)
NRBC BLD-RTO: 0 PER 100 WBC
NRBC BLD-RTO: 0 PER 100 WBC
O2/TOTAL GAS SETTING VFR VENT: 40 %
O2/TOTAL GAS SETTING VFR VENT: 40 %
O2/TOTAL GAS SETTING VFR VENT: 80 %
P-R INTERVAL, ECG05: 176 MS
PCO2 BLD: 36.6 MMHG (ref 35–45)
PCO2 BLD: 38.4 MMHG (ref 35–45)
PCO2 BLD: 39.2 MMHG (ref 35–45)
PCO2 BLD: 40.7 MMHG (ref 35–45)
PCO2 BLD: 42.1 MMHG (ref 35–45)
PCO2 BLD: 42.4 MMHG (ref 35–45)
PCO2 BLD: 42.6 MMHG (ref 35–45)
PCO2 BLD: 43.1 MMHG (ref 35–45)
PCO2 BLD: 43.6 MMHG (ref 35–45)
PCO2 BLD: 45.3 MMHG (ref 35–45)
PCO2 BLD: 47.6 MMHG (ref 35–45)
PEEP RESPIRATORY: 5 CMH2O
PH BLD: 7.36 (ref 7.35–7.45)
PH BLD: 7.37 (ref 7.35–7.45)
PH BLD: 7.38 (ref 7.35–7.45)
PH BLD: 7.39 (ref 7.35–7.45)
PH BLD: 7.4 (ref 7.35–7.45)
PH BLD: 7.4 (ref 7.35–7.45)
PH BLD: 7.43 (ref 7.35–7.45)
PH BLD: 7.43 (ref 7.35–7.45)
PIP ISTAT,IPIP: 18
PLATELET # BLD AUTO: 132 K/UL (ref 135–420)
PLATELET # BLD AUTO: 233 K/UL (ref 135–420)
PMV BLD AUTO: 8.4 FL (ref 9.2–11.8)
PMV BLD AUTO: 9 FL (ref 9.2–11.8)
PO2 BLD: 102 MMHG (ref 80–100)
PO2 BLD: 242 MMHG (ref 80–100)
PO2 BLD: 245 MMHG (ref 80–100)
PO2 BLD: 263 MMHG (ref 80–100)
PO2 BLD: 282 MMHG (ref 80–100)
PO2 BLD: 308 MMHG (ref 80–100)
PO2 BLD: 349 MMHG (ref 80–100)
PO2 BLD: 414 MMHG (ref 80–100)
PO2 BLD: 524 MMHG (ref 80–100)
PO2 BLD: 86 MMHG (ref 80–100)
PO2 BLD: 92 MMHG (ref 80–100)
POTASSIUM BLD-SCNC: 3.3 MMOL/L (ref 3.5–5.1)
POTASSIUM BLD-SCNC: 3.5 MMOL/L (ref 3.5–5.1)
POTASSIUM BLD-SCNC: 3.8 MMOL/L (ref 3.5–5.1)
POTASSIUM BLD-SCNC: 3.8 MMOL/L (ref 3.5–5.1)
POTASSIUM BLD-SCNC: 3.9 MMOL/L (ref 3.5–5.1)
POTASSIUM BLD-SCNC: 4.1 MMOL/L (ref 3.5–5.1)
POTASSIUM BLD-SCNC: 4.2 MMOL/L (ref 3.5–5.1)
POTASSIUM SERPL-SCNC: 3.5 MMOL/L (ref 3.5–5.5)
POTASSIUM SERPL-SCNC: 3.8 MMOL/L (ref 3.5–5.5)
PRESSURE SUPPORT SETTING VENT: 8 CMH2O
PROT SERPL-MCNC: 5.5 G/DL (ref 6.4–8.2)
PROT SERPL-MCNC: 7.7 G/DL (ref 6.4–8.2)
PROTHROMBIN TIME: 17.2 SEC (ref 11.5–15.2)
Q-T INTERVAL, ECG07: 438 MS
QRS DURATION, ECG06: 94 MS
QTC CALCULATION (BEZET), ECG08: 514 MS
RBC # BLD AUTO: 3.38 M/UL (ref 4.35–5.65)
RBC # BLD AUTO: 4.81 M/UL (ref 4.35–5.65)
SAO2 % BLD: 100 %
SAO2 % BLD: 96.9 % (ref 92–97)
SAO2 % BLD: 97.5 % (ref 92–97)
SAO2 % BLD: 97.6 % (ref 92–97)
SAO2 % BLD: 99.9 % (ref 92–97)
SERVICE CMNT-IMP: ABNORMAL
SERVICE CMNT-IMP: NORMAL
SODIUM BLD-SCNC: 127 MMOL/L (ref 136–145)
SODIUM BLD-SCNC: 129 MMOL/L (ref 136–145)
SODIUM BLD-SCNC: 130 MMOL/L (ref 136–145)
SODIUM BLD-SCNC: 131 MMOL/L (ref 136–145)
SODIUM BLD-SCNC: 134 MMOL/L (ref 136–145)
SODIUM SERPL-SCNC: 129 MMOL/L (ref 136–145)
SODIUM SERPL-SCNC: 137 MMOL/L (ref 136–145)
SPECIMEN SITE: ABNORMAL
SPECIMEN TYPE: ABNORMAL
SPECIMEN TYPE: NORMAL
VENTILATION MODE VENT: ABNORMAL
VENTRICULAR RATE, ECG03: 83 BPM
VT SETTING VENT: 550 ML
WBC # BLD AUTO: 10.1 K/UL (ref 4.6–13.2)
WBC # BLD AUTO: 7.1 K/UL (ref 4.6–13.2)

## 2023-01-16 PROCEDURE — 77030020061 HC IV BLD WRMR ADMIN SET 3M -B: Performed by: ANESTHESIOLOGY

## 2023-01-16 PROCEDURE — 74011000250 HC RX REV CODE- 250: Performed by: PHYSICIAN ASSISTANT

## 2023-01-16 PROCEDURE — 74011000272 HC RX REV CODE- 272: Performed by: THORACIC SURGERY (CARDIOTHORACIC VASCULAR SURGERY)

## 2023-01-16 PROCEDURE — 77030010813: Performed by: THORACIC SURGERY (CARDIOTHORACIC VASCULAR SURGERY)

## 2023-01-16 PROCEDURE — 77030011267 HC ELECTRD BLD COVD -A: Performed by: THORACIC SURGERY (CARDIOTHORACIC VASCULAR SURGERY)

## 2023-01-16 PROCEDURE — 76010000198 HC CV SURG 5 TO 5.5 HR INTENSV-TIER 1: Performed by: THORACIC SURGERY (CARDIOTHORACIC VASCULAR SURGERY)

## 2023-01-16 PROCEDURE — 77030041021 HC STBLZR CARD OCTPS MEDT -G1: Performed by: THORACIC SURGERY (CARDIOTHORACIC VASCULAR SURGERY)

## 2023-01-16 PROCEDURE — 0BH17EZ INSERTION OF ENDOTRACHEAL AIRWAY INTO TRACHEA, VIA NATURAL OR ARTIFICIAL OPENING: ICD-10-PCS | Performed by: THORACIC SURGERY (CARDIOTHORACIC VASCULAR SURGERY)

## 2023-01-16 PROCEDURE — 77030018723 HC ELCTRD BLD COVD -A: Performed by: THORACIC SURGERY (CARDIOTHORACIC VASCULAR SURGERY)

## 2023-01-16 PROCEDURE — 33533 CABG ARTERIAL SINGLE: CPT | Performed by: PHYSICIAN ASSISTANT

## 2023-01-16 PROCEDURE — 77030002945 HC SUT NRLN J&J -A: Performed by: THORACIC SURGERY (CARDIOTHORACIC VASCULAR SURGERY)

## 2023-01-16 PROCEDURE — 77030016037 HC MANFLD MULTI QUES -B: Performed by: ANESTHESIOLOGY

## 2023-01-16 PROCEDURE — 65620000000 HC RM CCU GENERAL

## 2023-01-16 PROCEDURE — 2709999900 HC NON-CHARGEABLE SUPPLY

## 2023-01-16 PROCEDURE — 93308 TTE F-UP OR LMTD: CPT

## 2023-01-16 PROCEDURE — 77030002987 HC SUT PROL J&J -B: Performed by: THORACIC SURGERY (CARDIOTHORACIC VASCULAR SURGERY)

## 2023-01-16 PROCEDURE — 74011250636 HC RX REV CODE- 250/636: Performed by: PHYSICIAN ASSISTANT

## 2023-01-16 PROCEDURE — 83605 ASSAY OF LACTIC ACID: CPT

## 2023-01-16 PROCEDURE — 74011250636 HC RX REV CODE- 250/636: Performed by: THORACIC SURGERY (CARDIOTHORACIC VASCULAR SURGERY)

## 2023-01-16 PROCEDURE — 77030018547 HC SUT ETHBND1 J&J -B: Performed by: THORACIC SURGERY (CARDIOTHORACIC VASCULAR SURGERY)

## 2023-01-16 PROCEDURE — 77030018719 HC DRSG PTCH ANTIMIC J&J -A: Performed by: ANESTHESIOLOGY

## 2023-01-16 PROCEDURE — 36620 INSERTION CATHETER ARTERY: CPT | Performed by: ANESTHESIOLOGY

## 2023-01-16 PROCEDURE — 77010033678 HC OXYGEN DAILY

## 2023-01-16 PROCEDURE — 77030010929 HC CLMP VASC FGRTY EDWD -B: Performed by: THORACIC SURGERY (CARDIOTHORACIC VASCULAR SURGERY)

## 2023-01-16 PROCEDURE — 77030010827: Performed by: THORACIC SURGERY (CARDIOTHORACIC VASCULAR SURGERY)

## 2023-01-16 PROCEDURE — 77030008771 HC TU NG SALEM SUMP -A: Performed by: ANESTHESIOLOGY

## 2023-01-16 PROCEDURE — 77030034848: Performed by: THORACIC SURGERY (CARDIOTHORACIC VASCULAR SURGERY)

## 2023-01-16 PROCEDURE — 93321 DOPPLER ECHO F-UP/LMTD STD: CPT | Performed by: INTERNAL MEDICINE

## 2023-01-16 PROCEDURE — 77030012390 HC DRN CHST BTL GTNG -B: Performed by: THORACIC SURGERY (CARDIOTHORACIC VASCULAR SURGERY)

## 2023-01-16 PROCEDURE — 36415 COLL VENOUS BLD VENIPUNCTURE: CPT

## 2023-01-16 PROCEDURE — 00567 ANES DIRECT CABG W/PUMP: CPT | Performed by: ANESTHESIOLOGY

## 2023-01-16 PROCEDURE — 93325 DOPPLER ECHO COLOR FLOW MAPG: CPT | Performed by: ANESTHESIOLOGY

## 2023-01-16 PROCEDURE — C9113 INJ PANTOPRAZOLE SODIUM, VIA: HCPCS | Performed by: PHYSICIAN ASSISTANT

## 2023-01-16 PROCEDURE — 77030002933 HC SUT MCRYL J&J -A: Performed by: THORACIC SURGERY (CARDIOTHORACIC VASCULAR SURGERY)

## 2023-01-16 PROCEDURE — 93320 DOPPLER ECHO COMPLETE: CPT | Performed by: ANESTHESIOLOGY

## 2023-01-16 PROCEDURE — 77030013797 HC KT TRNSDUC PRSSR EDWD -A: Performed by: ANESTHESIOLOGY

## 2023-01-16 PROCEDURE — 83735 ASSAY OF MAGNESIUM: CPT

## 2023-01-16 PROCEDURE — 77030040392 HC DRSG OPTIFOAM MDII -A: Performed by: THORACIC SURGERY (CARDIOTHORACIC VASCULAR SURGERY)

## 2023-01-16 PROCEDURE — 85025 COMPLETE CBC W/AUTO DIFF WBC: CPT

## 2023-01-16 PROCEDURE — 77030018390 HC SPNG HEMSTAT2 J&J -B: Performed by: THORACIC SURGERY (CARDIOTHORACIC VASCULAR SURGERY)

## 2023-01-16 PROCEDURE — 77030002912 HC SUT ETHBND J&J -A: Performed by: THORACIC SURGERY (CARDIOTHORACIC VASCULAR SURGERY)

## 2023-01-16 PROCEDURE — 77030007667 HC INSRT SUT HLD MEDT -B: Performed by: THORACIC SURGERY (CARDIOTHORACIC VASCULAR SURGERY)

## 2023-01-16 PROCEDURE — 74011250637 HC RX REV CODE- 250/637: Performed by: PHYSICIAN ASSISTANT

## 2023-01-16 PROCEDURE — 5A1935Z RESPIRATORY VENTILATION, LESS THAN 24 CONSECUTIVE HOURS: ICD-10-PCS | Performed by: THORACIC SURGERY (CARDIOTHORACIC VASCULAR SURGERY)

## 2023-01-16 PROCEDURE — 77030031139 HC SUT VCRL2 J&J -A: Performed by: THORACIC SURGERY (CARDIOTHORACIC VASCULAR SURGERY)

## 2023-01-16 PROCEDURE — 77030013313: Performed by: THORACIC SURGERY (CARDIOTHORACIC VASCULAR SURGERY)

## 2023-01-16 PROCEDURE — 77030008683 HC TU ET CUF COVD -A: Performed by: ANESTHESIOLOGY

## 2023-01-16 PROCEDURE — 82330 ASSAY OF CALCIUM: CPT

## 2023-01-16 PROCEDURE — C1751 CATH, INF, PER/CENT/MIDLINE: HCPCS | Performed by: THORACIC SURGERY (CARDIOTHORACIC VASCULAR SURGERY)

## 2023-01-16 PROCEDURE — 77030028842 HC SHNT IC CLRVW MEDT -B: Performed by: THORACIC SURGERY (CARDIOTHORACIC VASCULAR SURGERY)

## 2023-01-16 PROCEDURE — 76060000041 HC ANESTHESIA 5 TO 5.5 HR: Performed by: THORACIC SURGERY (CARDIOTHORACIC VASCULAR SURGERY)

## 2023-01-16 PROCEDURE — 77030013079 HC BLNKT BAIR HGGR 3M -A: Performed by: ANESTHESIOLOGY

## 2023-01-16 PROCEDURE — 33533 CABG ARTERIAL SINGLE: CPT | Performed by: THORACIC SURGERY (CARDIOTHORACIC VASCULAR SURGERY)

## 2023-01-16 PROCEDURE — 77030026855 HC PNCH AORT MYO AEMC -B: Performed by: THORACIC SURGERY (CARDIOTHORACIC VASCULAR SURGERY)

## 2023-01-16 PROCEDURE — 93005 ELECTROCARDIOGRAM TRACING: CPT

## 2023-01-16 PROCEDURE — 74011250636 HC RX REV CODE- 250/636: Performed by: ANESTHESIOLOGY

## 2023-01-16 PROCEDURE — P9045 ALBUMIN (HUMAN), 5%, 250 ML: HCPCS | Performed by: ANESTHESIOLOGY

## 2023-01-16 PROCEDURE — 74011636637 HC RX REV CODE- 636/637: Performed by: THORACIC SURGERY (CARDIOTHORACIC VASCULAR SURGERY)

## 2023-01-16 PROCEDURE — 85347 COAGULATION TIME ACTIVATED: CPT

## 2023-01-16 PROCEDURE — 77030002986 HC SUT PROL J&J -A: Performed by: THORACIC SURGERY (CARDIOTHORACIC VASCULAR SURGERY)

## 2023-01-16 PROCEDURE — 82962 GLUCOSE BLOOD TEST: CPT

## 2023-01-16 PROCEDURE — 33518 CABG ARTERY-VEIN TWO: CPT | Performed by: THORACIC SURGERY (CARDIOTHORACIC VASCULAR SURGERY)

## 2023-01-16 PROCEDURE — 021109W BYPASS CORONARY ARTERY, TWO ARTERIES FROM AORTA WITH AUTOLOGOUS VENOUS TISSUE, OPEN APPROACH: ICD-10-PCS | Performed by: THORACIC SURGERY (CARDIOTHORACIC VASCULAR SURGERY)

## 2023-01-16 PROCEDURE — 93308 TTE F-UP OR LMTD: CPT | Performed by: INTERNAL MEDICINE

## 2023-01-16 PROCEDURE — 74011000258 HC RX REV CODE- 258: Performed by: THORACIC SURGERY (CARDIOTHORACIC VASCULAR SURGERY)

## 2023-01-16 PROCEDURE — 06BP4ZZ EXCISION OF RIGHT SAPHENOUS VEIN, PERCUTANEOUS ENDOSCOPIC APPROACH: ICD-10-PCS | Performed by: THORACIC SURGERY (CARDIOTHORACIC VASCULAR SURGERY)

## 2023-01-16 PROCEDURE — 77030015683 HC ADPT CRDPLG MEDT -B: Performed by: THORACIC SURGERY (CARDIOTHORACIC VASCULAR SURGERY)

## 2023-01-16 PROCEDURE — 74011636637 HC RX REV CODE- 636/637: Performed by: ANESTHESIOLOGY

## 2023-01-16 PROCEDURE — 76937 US GUIDE VASCULAR ACCESS: CPT | Performed by: ANESTHESIOLOGY

## 2023-01-16 PROCEDURE — 80053 COMPREHEN METABOLIC PANEL: CPT

## 2023-01-16 PROCEDURE — 2709999900 HC NON-CHARGEABLE SUPPLY: Performed by: THORACIC SURGERY (CARDIOTHORACIC VASCULAR SURGERY)

## 2023-01-16 PROCEDURE — 33508 ENDOSCOPIC VEIN HARVEST: CPT | Performed by: THORACIC SURGERY (CARDIOTHORACIC VASCULAR SURGERY)

## 2023-01-16 PROCEDURE — 5A1221Z PERFORMANCE OF CARDIAC OUTPUT, CONTINUOUS: ICD-10-PCS | Performed by: THORACIC SURGERY (CARDIOTHORACIC VASCULAR SURGERY)

## 2023-01-16 PROCEDURE — 74011000250 HC RX REV CODE- 250: Performed by: ANESTHESIOLOGY

## 2023-01-16 PROCEDURE — 77030018729 HC ELECTRD DEFIB PAD CARD -B: Performed by: ANESTHESIOLOGY

## 2023-01-16 PROCEDURE — 77030002996 HC SUT SLK J&J -A: Performed by: ANESTHESIOLOGY

## 2023-01-16 PROCEDURE — 94762 N-INVAS EAR/PLS OXIMTRY CONT: CPT

## 2023-01-16 PROCEDURE — 33508 ENDOSCOPIC VEIN HARVEST: CPT | Performed by: PHYSICIAN ASSISTANT

## 2023-01-16 PROCEDURE — 93325 DOPPLER ECHO COLOR FLOW MAPG: CPT | Performed by: INTERNAL MEDICINE

## 2023-01-16 PROCEDURE — 77030026918 HC ADMN ST IV BLD BD -A: Performed by: ANESTHESIOLOGY

## 2023-01-16 PROCEDURE — 80076 HEPATIC FUNCTION PANEL: CPT

## 2023-01-16 PROCEDURE — 33518 CABG ARTERY-VEIN TWO: CPT | Performed by: PHYSICIAN ASSISTANT

## 2023-01-16 PROCEDURE — 85730 THROMBOPLASTIN TIME PARTIAL: CPT

## 2023-01-16 PROCEDURE — 77030013140 HC MSK NEB VYRM -A: Performed by: ANESTHESIOLOGY

## 2023-01-16 PROCEDURE — 71045 X-RAY EXAM CHEST 1 VIEW: CPT

## 2023-01-16 PROCEDURE — 77030018836 HC SOL IRR NACL ICUM -A: Performed by: THORACIC SURGERY (CARDIOTHORACIC VASCULAR SURGERY)

## 2023-01-16 PROCEDURE — 85610 PROTHROMBIN TIME: CPT

## 2023-01-16 PROCEDURE — 82947 ASSAY GLUCOSE BLOOD QUANT: CPT

## 2023-01-16 PROCEDURE — 77030005401 HC CATH RAD ARRO -A: Performed by: ANESTHESIOLOGY

## 2023-01-16 PROCEDURE — 02100Z9 BYPASS CORONARY ARTERY, ONE ARTERY FROM LEFT INTERNAL MAMMARY, OPEN APPROACH: ICD-10-PCS | Performed by: THORACIC SURGERY (CARDIOTHORACIC VASCULAR SURGERY)

## 2023-01-16 PROCEDURE — 93312 ECHO TRANSESOPHAGEAL: CPT | Performed by: ANESTHESIOLOGY

## 2023-01-16 PROCEDURE — C1769 GUIDE WIRE: HCPCS | Performed by: THORACIC SURGERY (CARDIOTHORACIC VASCULAR SURGERY)

## 2023-01-16 PROCEDURE — 77030008500 HC TBNG CONN PRSS BD -A: Performed by: ANESTHESIOLOGY

## 2023-01-16 PROCEDURE — 87389 HIV-1 AG W/HIV-1&-2 AB AG IA: CPT

## 2023-01-16 PROCEDURE — 77030014491 HC PLEDG PTFE BARD -A: Performed by: THORACIC SURGERY (CARDIOTHORACIC VASCULAR SURGERY)

## 2023-01-16 PROCEDURE — 99223 1ST HOSP IP/OBS HIGH 75: CPT | Performed by: INTERNAL MEDICINE

## 2023-01-16 PROCEDURE — 77030012407 HC DRN WND BARD -B: Performed by: THORACIC SURGERY (CARDIOTHORACIC VASCULAR SURGERY)

## 2023-01-16 PROCEDURE — 77030002996 HC SUT SLK J&J -A: Performed by: THORACIC SURGERY (CARDIOTHORACIC VASCULAR SURGERY)

## 2023-01-16 PROCEDURE — 74011000258 HC RX REV CODE- 258: Performed by: ANESTHESIOLOGY

## 2023-01-16 PROCEDURE — 84132 ASSAY OF SERUM POTASSIUM: CPT

## 2023-01-16 PROCEDURE — 36556 INSERT NON-TUNNEL CV CATH: CPT | Performed by: ANESTHESIOLOGY

## 2023-01-16 PROCEDURE — 87521 HEPATITIS C PROBE&RVRS TRNSC: CPT

## 2023-01-16 PROCEDURE — 77030010818: Performed by: THORACIC SURGERY (CARDIOTHORACIC VASCULAR SURGERY)

## 2023-01-16 PROCEDURE — 77030022199 HC SYS HARV VESL GTNG -G1: Performed by: THORACIC SURGERY (CARDIOTHORACIC VASCULAR SURGERY)

## 2023-01-16 PROCEDURE — 77030005401 HC CATH RAD ARRO -A: Performed by: THORACIC SURGERY (CARDIOTHORACIC VASCULAR SURGERY)

## 2023-01-16 PROCEDURE — 77030033036 HC BLWR MISTR ACUMIST MEDT -C: Performed by: THORACIC SURGERY (CARDIOTHORACIC VASCULAR SURGERY)

## 2023-01-16 PROCEDURE — 94002 VENT MGMT INPAT INIT DAY: CPT

## 2023-01-16 PROCEDURE — 87340 HEPATITIS B SURFACE AG IA: CPT

## 2023-01-16 PROCEDURE — 77030025415: Performed by: THORACIC SURGERY (CARDIOTHORACIC VASCULAR SURGERY)

## 2023-01-16 PROCEDURE — 74011250637 HC RX REV CODE- 250/637: Performed by: THORACIC SURGERY (CARDIOTHORACIC VASCULAR SURGERY)

## 2023-01-16 PROCEDURE — 77030019896 HC KT ARTERIAL LN TELE -B: Performed by: THORACIC SURGERY (CARDIOTHORACIC VASCULAR SURGERY)

## 2023-01-16 PROCEDURE — P9045 ALBUMIN (HUMAN), 5%, 250 ML: HCPCS | Performed by: PHYSICIAN ASSISTANT

## 2023-01-16 RX ORDER — METOPROLOL TARTRATE 25 MG/1
12.5 TABLET, FILM COATED ORAL 2 TIMES DAILY
Status: CANCELLED | OUTPATIENT
Start: 2023-01-16

## 2023-01-16 RX ORDER — MUPIROCIN 20 MG/G
OINTMENT TOPICAL 2 TIMES DAILY
Status: COMPLETED | OUTPATIENT
Start: 2023-01-16 | End: 2023-01-21

## 2023-01-16 RX ORDER — NALOXONE HYDROCHLORIDE 0.4 MG/ML
0.4 INJECTION, SOLUTION INTRAMUSCULAR; INTRAVENOUS; SUBCUTANEOUS AS NEEDED
Status: DISCONTINUED | OUTPATIENT
Start: 2023-01-16 | End: 2023-01-21 | Stop reason: HOSPADM

## 2023-01-16 RX ORDER — DEXTROSE MONOHYDRATE 100 MG/ML
0-250 INJECTION, SOLUTION INTRAVENOUS AS NEEDED
Status: DISCONTINUED | OUTPATIENT
Start: 2023-01-16 | End: 2023-01-21 | Stop reason: HOSPADM

## 2023-01-16 RX ORDER — LIDOCAINE HYDROCHLORIDE 20 MG/ML
INJECTION, SOLUTION EPIDURAL; INFILTRATION; INTRACAUDAL; PERINEURAL AS NEEDED
Status: DISCONTINUED | OUTPATIENT
Start: 2023-01-16 | End: 2023-01-16 | Stop reason: HOSPADM

## 2023-01-16 RX ORDER — ROCURONIUM BROMIDE 10 MG/ML
INJECTION, SOLUTION INTRAVENOUS AS NEEDED
Status: DISCONTINUED | OUTPATIENT
Start: 2023-01-16 | End: 2023-01-16 | Stop reason: HOSPADM

## 2023-01-16 RX ORDER — ONDANSETRON 2 MG/ML
4 INJECTION INTRAMUSCULAR; INTRAVENOUS
Status: DISCONTINUED | OUTPATIENT
Start: 2023-01-16 | End: 2023-01-21 | Stop reason: HOSPADM

## 2023-01-16 RX ORDER — IBUPROFEN 200 MG
4 TABLET ORAL AS NEEDED
Status: DISCONTINUED | OUTPATIENT
Start: 2023-01-16 | End: 2023-01-17 | Stop reason: SDUPTHER

## 2023-01-16 RX ORDER — ALBUTEROL SULFATE 0.83 MG/ML
2.5 SOLUTION RESPIRATORY (INHALATION)
Status: DISCONTINUED | OUTPATIENT
Start: 2023-01-16 | End: 2023-01-21 | Stop reason: HOSPADM

## 2023-01-16 RX ORDER — ALBUMIN HUMAN 50 G/1000ML
12.5 SOLUTION INTRAVENOUS
Status: DISCONTINUED | OUTPATIENT
Start: 2023-01-16 | End: 2023-01-21 | Stop reason: HOSPADM

## 2023-01-16 RX ORDER — DOCUSATE SODIUM 100 MG/1
100 CAPSULE, LIQUID FILLED ORAL 2 TIMES DAILY
Status: DISCONTINUED | OUTPATIENT
Start: 2023-01-17 | End: 2023-01-21 | Stop reason: HOSPADM

## 2023-01-16 RX ORDER — SODIUM CHLORIDE 0.9 % (FLUSH) 0.9 %
5-40 SYRINGE (ML) INJECTION AS NEEDED
Status: DISCONTINUED | OUTPATIENT
Start: 2023-01-16 | End: 2023-01-21 | Stop reason: HOSPADM

## 2023-01-16 RX ORDER — NOREPINEPHRINE BITARTRATE/D5W 8 MG/250ML
2-16 PLASTIC BAG, INJECTION (ML) INTRAVENOUS
Status: DISCONTINUED | OUTPATIENT
Start: 2023-01-16 | End: 2023-01-17

## 2023-01-16 RX ORDER — POLYETHYLENE GLYCOL 3350 17 G/17G
17 POWDER, FOR SOLUTION ORAL DAILY
Status: DISCONTINUED | OUTPATIENT
Start: 2023-01-17 | End: 2023-01-21 | Stop reason: HOSPADM

## 2023-01-16 RX ORDER — OXYCODONE HYDROCHLORIDE 5 MG/1
5-10 TABLET ORAL
Status: DISPENSED | OUTPATIENT
Start: 2023-01-16 | End: 2023-01-17

## 2023-01-16 RX ORDER — ASPIRIN 81 MG/1
81 TABLET ORAL DAILY
Status: DISCONTINUED | OUTPATIENT
Start: 2023-01-17 | End: 2023-01-21 | Stop reason: HOSPADM

## 2023-01-16 RX ORDER — FENTANYL CITRATE 50 UG/ML
INJECTION, SOLUTION INTRAMUSCULAR; INTRAVENOUS AS NEEDED
Status: DISCONTINUED | OUTPATIENT
Start: 2023-01-16 | End: 2023-01-16 | Stop reason: HOSPADM

## 2023-01-16 RX ORDER — PROPOFOL 10 MG/ML
0-50 VIAL (ML) INTRAVENOUS
Status: DISCONTINUED | OUTPATIENT
Start: 2023-01-16 | End: 2023-01-17

## 2023-01-16 RX ORDER — ACETAMINOPHEN 650 MG/1
SUPPOSITORY RECTAL
Status: DISCONTINUED
Start: 2023-01-16 | End: 2023-01-16

## 2023-01-16 RX ORDER — NITROGLYCERIN 40 MG/100ML
0-200 INJECTION INTRAVENOUS
Status: DISCONTINUED | OUTPATIENT
Start: 2023-01-16 | End: 2023-01-17

## 2023-01-16 RX ORDER — PROPOFOL 10 MG/ML
INJECTION, EMULSION INTRAVENOUS AS NEEDED
Status: DISCONTINUED | OUTPATIENT
Start: 2023-01-16 | End: 2023-01-16 | Stop reason: HOSPADM

## 2023-01-16 RX ORDER — SODIUM CHLORIDE, SODIUM LACTATE, POTASSIUM CHLORIDE, CALCIUM CHLORIDE 600; 310; 30; 20 MG/100ML; MG/100ML; MG/100ML; MG/100ML
INJECTION, SOLUTION INTRAVENOUS
Status: DISCONTINUED | OUTPATIENT
Start: 2023-01-16 | End: 2023-01-16 | Stop reason: HOSPADM

## 2023-01-16 RX ORDER — PROPOFOL 10 MG/ML
VIAL (ML) INTRAVENOUS
Status: DISCONTINUED | OUTPATIENT
Start: 2023-01-16 | End: 2023-01-16 | Stop reason: HOSPADM

## 2023-01-16 RX ORDER — ALBUMIN HUMAN 50 G/1000ML
SOLUTION INTRAVENOUS
Status: DISPENSED
Start: 2023-01-16 | End: 2023-01-16

## 2023-01-16 RX ORDER — CLOPIDOGREL BISULFATE 75 MG/1
75 TABLET ORAL DAILY
Status: DISCONTINUED | OUTPATIENT
Start: 2023-01-19 | End: 2023-01-17

## 2023-01-16 RX ORDER — SODIUM CHLORIDE 0.9 % (FLUSH) 0.9 %
5-40 SYRINGE (ML) INJECTION EVERY 8 HOURS
Status: DISCONTINUED | OUTPATIENT
Start: 2023-01-16 | End: 2023-01-21 | Stop reason: HOSPADM

## 2023-01-16 RX ORDER — SODIUM CHLORIDE 9 MG/ML
10 INJECTION, SOLUTION INTRAVENOUS CONTINUOUS
Status: DISCONTINUED | OUTPATIENT
Start: 2023-01-16 | End: 2023-01-18

## 2023-01-16 RX ORDER — MIDAZOLAM HYDROCHLORIDE 1 MG/ML
INJECTION, SOLUTION INTRAMUSCULAR; INTRAVENOUS AS NEEDED
Status: DISCONTINUED | OUTPATIENT
Start: 2023-01-16 | End: 2023-01-16 | Stop reason: HOSPADM

## 2023-01-16 RX ORDER — HYDROMORPHONE HYDROCHLORIDE 1 MG/ML
INJECTION, SOLUTION INTRAMUSCULAR; INTRAVENOUS; SUBCUTANEOUS AS NEEDED
Status: DISCONTINUED | OUTPATIENT
Start: 2023-01-16 | End: 2023-01-16 | Stop reason: HOSPADM

## 2023-01-16 RX ORDER — FENTANYL CITRATE 50 UG/ML
12.5-25 INJECTION, SOLUTION INTRAMUSCULAR; INTRAVENOUS
Status: DISCONTINUED | OUTPATIENT
Start: 2023-01-16 | End: 2023-01-17

## 2023-01-16 RX ORDER — VANCOMYCIN HYDROCHLORIDE 1 G/20ML
INJECTION, POWDER, LYOPHILIZED, FOR SOLUTION INTRAVENOUS AS NEEDED
Status: DISCONTINUED | OUTPATIENT
Start: 2023-01-16 | End: 2023-01-16 | Stop reason: HOSPADM

## 2023-01-16 RX ORDER — PROTAMINE SULFATE 10 MG/ML
INJECTION, SOLUTION INTRAVENOUS AS NEEDED
Status: DISCONTINUED | OUTPATIENT
Start: 2023-01-16 | End: 2023-01-16 | Stop reason: HOSPADM

## 2023-01-16 RX ORDER — AMIODARONE HYDROCHLORIDE 200 MG/1
200 TABLET ORAL 3 TIMES DAILY
Status: DISCONTINUED | OUTPATIENT
Start: 2023-01-17 | End: 2023-01-20

## 2023-01-16 RX ORDER — SUCCINYLCHOLINE CHLORIDE 20 MG/ML
INJECTION INTRAMUSCULAR; INTRAVENOUS AS NEEDED
Status: DISCONTINUED | OUTPATIENT
Start: 2023-01-16 | End: 2023-01-16 | Stop reason: HOSPADM

## 2023-01-16 RX ORDER — ACETAMINOPHEN 325 MG/1
650 TABLET ORAL
Status: DISCONTINUED | OUTPATIENT
Start: 2023-01-16 | End: 2023-01-21 | Stop reason: HOSPADM

## 2023-01-16 RX ORDER — HEPARIN SODIUM 1000 [USP'U]/ML
INJECTION, SOLUTION INTRAVENOUS; SUBCUTANEOUS
Status: COMPLETED
Start: 2023-01-16 | End: 2023-01-16

## 2023-01-16 RX ORDER — VANCOMYCIN HYDROCHLORIDE 1 G/20ML
INJECTION, POWDER, LYOPHILIZED, FOR SOLUTION INTRAVENOUS
Status: DISCONTINUED
Start: 2023-01-16 | End: 2023-01-16

## 2023-01-16 RX ORDER — HEPARIN SODIUM 5000 [USP'U]/ML
5000 INJECTION, SOLUTION INTRAVENOUS; SUBCUTANEOUS EVERY 8 HOURS
Status: DISCONTINUED | OUTPATIENT
Start: 2023-01-17 | End: 2023-01-21 | Stop reason: HOSPADM

## 2023-01-16 RX ORDER — PAPAVERINE HYDROCHLORIDE 30 MG/ML
INJECTION INTRAMUSCULAR; INTRAVENOUS
Status: DISCONTINUED
Start: 2023-01-16 | End: 2023-01-16

## 2023-01-16 RX ORDER — ACETAMINOPHEN 650 MG/1
SUPPOSITORY RECTAL AS NEEDED
Status: DISCONTINUED | OUTPATIENT
Start: 2023-01-16 | End: 2023-01-16 | Stop reason: HOSPADM

## 2023-01-16 RX ORDER — ALBUMIN HUMAN 50 G/1000ML
SOLUTION INTRAVENOUS AS NEEDED
Status: DISCONTINUED | OUTPATIENT
Start: 2023-01-16 | End: 2023-01-16 | Stop reason: HOSPADM

## 2023-01-16 RX ORDER — MORPHINE SULFATE 2 MG/ML
2-4 INJECTION, SOLUTION INTRAMUSCULAR; INTRAVENOUS
Status: DISCONTINUED | OUTPATIENT
Start: 2023-01-16 | End: 2023-01-21 | Stop reason: HOSPADM

## 2023-01-16 RX ORDER — HYDROCODONE BITARTRATE AND ACETAMINOPHEN 5; 325 MG/1; MG/1
1-2 TABLET ORAL
Status: DISCONTINUED | OUTPATIENT
Start: 2023-01-16 | End: 2023-01-17

## 2023-01-16 RX ORDER — METOPROLOL TARTRATE 5 MG/5ML
1.25 INJECTION INTRAVENOUS ONCE
Status: CANCELLED | OUTPATIENT
Start: 2023-01-16 | End: 2023-01-16

## 2023-01-16 RX ORDER — HEPARIN SODIUM 1000 [USP'U]/ML
INJECTION, SOLUTION INTRAVENOUS; SUBCUTANEOUS AS NEEDED
Status: DISCONTINUED | OUTPATIENT
Start: 2023-01-16 | End: 2023-01-16 | Stop reason: HOSPADM

## 2023-01-16 RX ORDER — BISACODYL 5 MG
10 TABLET, DELAYED RELEASE (ENTERIC COATED) ORAL DAILY
Status: DISCONTINUED | OUTPATIENT
Start: 2023-01-17 | End: 2023-01-20

## 2023-01-16 RX ORDER — MANNITOL 20 G/100ML
INJECTION, SOLUTION INTRAVENOUS
Status: DISCONTINUED | OUTPATIENT
Start: 2023-01-16 | End: 2023-01-16 | Stop reason: HOSPADM

## 2023-01-16 RX ORDER — POTASSIUM CHLORIDE 14.9 MG/ML
20 INJECTION INTRAVENOUS ONCE
Status: COMPLETED | OUTPATIENT
Start: 2023-01-16 | End: 2023-01-17

## 2023-01-16 RX ORDER — CHLORHEXIDINE GLUCONATE 1.2 MG/ML
10 RINSE ORAL 2 TIMES DAILY
Status: DISCONTINUED | OUTPATIENT
Start: 2023-01-16 | End: 2023-01-21 | Stop reason: HOSPADM

## 2023-01-16 RX ORDER — CALCIUM CHLORIDE INJECTION 100 MG/ML
INJECTION, SOLUTION INTRAVENOUS AS NEEDED
Status: DISCONTINUED | OUTPATIENT
Start: 2023-01-16 | End: 2023-01-16 | Stop reason: HOSPADM

## 2023-01-16 RX ORDER — AMINOCAPROIC ACID 250 MG/ML
INJECTION, SOLUTION INTRAVENOUS AS NEEDED
Status: DISCONTINUED | OUTPATIENT
Start: 2023-01-16 | End: 2023-01-16 | Stop reason: HOSPADM

## 2023-01-16 RX ADMIN — ALBUMIN (HUMAN) 12.5 G: 12.5 INJECTION, SOLUTION INTRAVENOUS at 13:36

## 2023-01-16 RX ADMIN — FENTANYL CITRATE 50 MCG: 50 INJECTION INTRAMUSCULAR; INTRAVENOUS at 11:48

## 2023-01-16 RX ADMIN — HYDROMORPHONE HYDROCHLORIDE 1 MG: 1 INJECTION, SOLUTION INTRAMUSCULAR; INTRAVENOUS; SUBCUTANEOUS at 09:48

## 2023-01-16 RX ADMIN — FENTANYL CITRATE 100 MCG: 50 INJECTION INTRAMUSCULAR; INTRAVENOUS at 07:58

## 2023-01-16 RX ADMIN — CHLORHEXIDINE GLUCONATE 0.12% ORAL RINSE 10 ML: 1.2 LIQUID ORAL at 17:15

## 2023-01-16 RX ADMIN — HYDROCODONE BITARTRATE AND ACETAMINOPHEN 2 TABLET: 5; 325 TABLET ORAL at 20:42

## 2023-01-16 RX ADMIN — METOPROLOL TARTRATE 12.5 MG: 25 TABLET, FILM COATED ORAL at 00:12

## 2023-01-16 RX ADMIN — Medication 10 UNITS/HR: at 09:46

## 2023-01-16 RX ADMIN — SODIUM CHLORIDE, SODIUM LACTATE, POTASSIUM CHLORIDE, AND CALCIUM CHLORIDE: 600; 310; 30; 20 INJECTION, SOLUTION INTRAVENOUS at 07:43

## 2023-01-16 RX ADMIN — OXYCODONE HYDROCHLORIDE 5 MG: 5 TABLET ORAL at 18:23

## 2023-01-16 RX ADMIN — SODIUM CHLORIDE 75 ML/HR: 9 INJECTION, SOLUTION INTRAVENOUS at 03:00

## 2023-01-16 RX ADMIN — ALBUMIN (HUMAN) 250 ML: 12.5 SOLUTION INTRAVENOUS at 11:48

## 2023-01-16 RX ADMIN — PHENYLEPHRINE HYDROCHLORIDE 100 MCG: 10 INJECTION INTRAVENOUS at 07:56

## 2023-01-16 RX ADMIN — ROCURONIUM BROMIDE 30 MG: 50 INJECTION INTRAVENOUS at 11:38

## 2023-01-16 RX ADMIN — SODIUM CHLORIDE, PRESERVATIVE FREE 10 ML: 5 INJECTION INTRAVENOUS at 06:29

## 2023-01-16 RX ADMIN — PROTAMINE SULFATE 250 MG: 10 INJECTION, SOLUTION INTRAVENOUS at 11:47

## 2023-01-16 RX ADMIN — FENTANYL CITRATE 100 MCG: 50 INJECTION INTRAMUSCULAR; INTRAVENOUS at 08:59

## 2023-01-16 RX ADMIN — PROPOFOL 200 MG: 10 INJECTION, EMULSION INTRAVENOUS at 07:58

## 2023-01-16 RX ADMIN — CEFAZOLIN SODIUM 2 G: 1 INJECTION, POWDER, FOR SOLUTION INTRAMUSCULAR; INTRAVENOUS at 12:21

## 2023-01-16 RX ADMIN — MUPIROCIN: 20 OINTMENT TOPICAL at 17:16

## 2023-01-16 RX ADMIN — FENTANYL CITRATE 150 MCG: 50 INJECTION INTRAMUSCULAR; INTRAVENOUS at 12:21

## 2023-01-16 RX ADMIN — MORPHINE SULFATE 4 MG: 2 INJECTION, SOLUTION INTRAMUSCULAR; INTRAVENOUS at 19:34

## 2023-01-16 RX ADMIN — MIDAZOLAM HYDROCHLORIDE 2 MG: 2 INJECTION, SOLUTION INTRAMUSCULAR; INTRAVENOUS at 11:48

## 2023-01-16 RX ADMIN — PHENYLEPHRINE HYDROCHLORIDE 100 MCG: 10 INJECTION INTRAVENOUS at 08:21

## 2023-01-16 RX ADMIN — PHENYLEPHRINE HYDROCHLORIDE 100 MCG: 10 INJECTION INTRAVENOUS at 08:02

## 2023-01-16 RX ADMIN — ROCURONIUM BROMIDE 40 MG: 50 INJECTION INTRAVENOUS at 08:52

## 2023-01-16 RX ADMIN — HEPARIN SODIUM 30000 UNITS: 1000 INJECTION, SOLUTION INTRAVENOUS; SUBCUTANEOUS at 09:28

## 2023-01-16 RX ADMIN — PROPOFOL 50 MG: 10 INJECTION, EMULSION INTRAVENOUS at 08:59

## 2023-01-16 RX ADMIN — POTASSIUM CHLORIDE 20 MEQ: 14.9 INJECTION, SOLUTION INTRAVENOUS at 17:16

## 2023-01-16 RX ADMIN — PHENYLEPHRINE HYDROCHLORIDE 100 MCG: 10 INJECTION INTRAVENOUS at 08:48

## 2023-01-16 RX ADMIN — PROTAMINE SULFATE 25 MG: 10 INJECTION, SOLUTION INTRAVENOUS at 12:13

## 2023-01-16 RX ADMIN — CEFAZOLIN SODIUM 2 G: 1 INJECTION, POWDER, FOR SOLUTION INTRAMUSCULAR; INTRAVENOUS at 08:30

## 2023-01-16 RX ADMIN — NITROGLYCERIN 10 MCG/MIN: 40 INJECTION INTRAVENOUS at 15:11

## 2023-01-16 RX ADMIN — SODIUM CHLORIDE, PRESERVATIVE FREE 10 ML: 5 INJECTION INTRAVENOUS at 14:59

## 2023-01-16 RX ADMIN — AMINOCAPROIC ACID 1 G/HR: 250 INJECTION, SOLUTION INTRAVENOUS at 08:52

## 2023-01-16 RX ADMIN — LIDOCAINE HYDROCHLORIDE 100 MG: 20 INJECTION, SOLUTION EPIDURAL; INFILTRATION; INTRACAUDAL; PERINEURAL at 07:58

## 2023-01-16 RX ADMIN — FENTANYL CITRATE 25 MCG: 50 INJECTION, SOLUTION INTRAMUSCULAR; INTRAVENOUS at 18:57

## 2023-01-16 RX ADMIN — METOPROLOL TARTRATE 12.5 MG: 25 TABLET, FILM COATED ORAL at 17:17

## 2023-01-16 RX ADMIN — MORPHINE SULFATE 2 MG: 2 INJECTION, SOLUTION INTRAMUSCULAR; INTRAVENOUS at 16:42

## 2023-01-16 RX ADMIN — ROCURONIUM BROMIDE 10 MG: 50 INJECTION INTRAVENOUS at 07:58

## 2023-01-16 RX ADMIN — PHENYLEPHRINE HYDROCHLORIDE 100 MCG: 10 INJECTION INTRAVENOUS at 08:15

## 2023-01-16 RX ADMIN — SODIUM CHLORIDE, PRESERVATIVE FREE 40 MG: 5 INJECTION INTRAVENOUS at 14:59

## 2023-01-16 RX ADMIN — FENTANYL CITRATE 100 MCG: 50 INJECTION INTRAMUSCULAR; INTRAVENOUS at 10:58

## 2023-01-16 RX ADMIN — ROCURONIUM BROMIDE 40 MG: 50 INJECTION INTRAVENOUS at 09:39

## 2023-01-16 RX ADMIN — PROPOFOL 25 MCG/KG/MIN: 10 INJECTION, EMULSION INTRAVENOUS at 12:06

## 2023-01-16 RX ADMIN — PROPOFOL 50 MG: 10 INJECTION, EMULSION INTRAVENOUS at 09:48

## 2023-01-16 RX ADMIN — CALCIUM CHLORIDE 500 MG: 100 INJECTION INTRAVENOUS; INTRAVENTRICULAR at 12:23

## 2023-01-16 RX ADMIN — MIDAZOLAM HYDROCHLORIDE 2 MG: 2 INJECTION, SOLUTION INTRAMUSCULAR; INTRAVENOUS at 07:43

## 2023-01-16 RX ADMIN — PHENYLEPHRINE HYDROCHLORIDE 100 MCG: 10 INJECTION INTRAVENOUS at 09:57

## 2023-01-16 RX ADMIN — PROTAMINE SULFATE 50 MG: 10 INJECTION, SOLUTION INTRAVENOUS at 12:30

## 2023-01-16 RX ADMIN — MANNITOL: 20 INJECTION, SOLUTION INTRAVENOUS at 08:00

## 2023-01-16 RX ADMIN — AMINOCAPROIC ACID 5 G: 250 INJECTION, SOLUTION INTRAVENOUS at 08:52

## 2023-01-16 RX ADMIN — ROCURONIUM BROMIDE 50 MG: 50 INJECTION INTRAVENOUS at 08:00

## 2023-01-16 RX ADMIN — ALBUMIN (HUMAN) 12.5 G: 12.5 INJECTION, SOLUTION INTRAVENOUS at 23:53

## 2023-01-16 RX ADMIN — MORPHINE SULFATE 4 MG: 2 INJECTION, SOLUTION INTRAMUSCULAR; INTRAVENOUS at 23:13

## 2023-01-16 RX ADMIN — PHENYLEPHRINE HYDROCHLORIDE 100 MCG: 10 INJECTION INTRAVENOUS at 09:43

## 2023-01-16 RX ADMIN — CEFAZOLIN 2 G: 1 INJECTION, POWDER, FOR SOLUTION INTRAMUSCULAR; INTRAVENOUS at 19:34

## 2023-01-16 RX ADMIN — PHENYLEPHRINE HYDROCHLORIDE 100 MCG: 10 INJECTION INTRAVENOUS at 08:54

## 2023-01-16 RX ADMIN — SODIUM CHLORIDE 10 ML/HR: 9 INJECTION, SOLUTION INTRAVENOUS at 14:31

## 2023-01-16 RX ADMIN — MIDAZOLAM HYDROCHLORIDE 2 MG: 2 INJECTION, SOLUTION INTRAMUSCULAR; INTRAVENOUS at 09:48

## 2023-01-16 RX ADMIN — HYDROMORPHONE HYDROCHLORIDE 1 MG: 1 INJECTION, SOLUTION INTRAMUSCULAR; INTRAVENOUS; SUBCUTANEOUS at 08:54

## 2023-01-16 RX ADMIN — SUCCINYLCHOLINE CHLORIDE 100 MG: 20 INJECTION, SOLUTION INTRAMUSCULAR; INTRAVENOUS at 07:58

## 2023-01-16 RX ADMIN — PHENYLEPHRINE HYDROCHLORIDE 20 MCG/MIN: 10 INJECTION INTRAVENOUS at 11:45

## 2023-01-16 RX ADMIN — NOREPINEPHRINE BITARTRATE 1 MCG/MIN: 1 INJECTION, SOLUTION, CONCENTRATE INTRAVENOUS at 11:43

## 2023-01-16 RX ADMIN — FENTANYL CITRATE 25 MCG: 50 INJECTION, SOLUTION INTRAMUSCULAR; INTRAVENOUS at 17:18

## 2023-01-16 RX ADMIN — FENTANYL CITRATE 25 MCG: 50 INJECTION, SOLUTION INTRAMUSCULAR; INTRAVENOUS at 15:59

## 2023-01-16 NOTE — ANESTHESIA PROCEDURE NOTES
NICOLASA  Date/Time: 1/16/2023 9:04 AM      Procedure Details: probe placement, image aquisition & interpretation    Site marked, Timeout performed, 09:04 EST  Risks and benefits discussed with the patient and plans are to proceed    Procedure Note    Performed by: Sid Enrique MD  Authorized by: Sid Enrique MD     Indications: assessment of ascending aorta  Modalities: 2D, CF, CWD, PWD  Probe Type: multiplane  Insertion: atraumatic  Patient Status: intubated and sedated  Echocardiographic and Doppler Measurements   Aorta  Size  Diam(cm)  Dissection PlaqueThick(mm)  Plaque Mobile    Ascending normal 3.4 No 0-3 No    Arch normal  No 0-3 No    Descending normal  No 0-3 No          Valves  Annulus  Stenosis  Area/Grad  Regurg  Leaflet   Morph  Leaflet   Motion    Aortic normal none  0 normal normal    Mitral normal none  0 normal normal    Tricuspid normal none  0 normal normal          Atria  Size  SEC (smoke)  Thrombus  Tumor  Device    Rt Atrium dilated No No No No    Lt Atrium normal No No No No     Interatrial Septum Morphology: lipomatous hypertrophy    Interventricular Septum Morphology: normal  Ventricle  Cavity Size  Cavity Dimension Hypertrophy  Thrombus  Gloal FXN  EF    RV dilated  No no normal     LV normal   No normal 55       Regional Function  (1 = normal, 2 = mildly hypokinetic, 3 = severely hypokinetic, 4 = akinetic, 5 = dyskinetic) LAV - Long [place]   ME LAV = 0  ME LAV = 90  ME LAV = 130   Basal Sept:2     Mid Sept:2     Apical Sept:2  Basal Ant Sept:2     Mid Ant Sept:2                 Pericardium: normal    Post Intervention Follow-up Study  Ventricular Global Function: unchanged  Ventricular Regional Function: unchanged     Valve  Function  Regurgitation  Area    Aortic no change      Mitral no change      Tricuspid no change      Prosthetic        Complications:  Yes

## 2023-01-16 NOTE — ANESTHESIA PROCEDURE NOTES
Central Line Placement    Start time: 1/16/2023 9:02 AM  End time: 1/16/2023 9:02 AM  Performed by: Mile Rosario MD  Authorized by: Mile Rosario MD     Indications: vascular access, central pressure monitoring and need for vasopressors  Preanesthetic Checklist: patient identified, risks and benefits discussed, anesthesia consent, site marked, patient being monitored, timeout performed and fire risk safety assessment completed and verbalized    Timeout Time: 09:02 EST       Pre-procedure: All elements of maximal sterile barrier technique followed? Yes    2% Chlorhexidine for cutaneous antisepsis, Hand hygiene performed prior to catheter insertion and Ultrasound guidance    Sterile Ultrasound Technique followed?: Yes        Ultrasound Image Stored?  Image stored    Procedure:   Prep:  Chlorhexidine  Location: internal jugular  Orientation:  Right  Patient position:  Trendelenburg  Catheter type:  Single lumen  Catheter size:  7 Fr  Catheter length:  10 cm  Number of attempts:  1  Successful placement: Yes      Assessment:   Post-procedure:  Catheter secured, sterile dressing applied and sterile dressing with CHG applied  Assessment:  Free fluid flow, blood return through all ports and guidewire removal verified  Insertion:  Uncomplicated  Patient tolerance:  Patient tolerated the procedure well with no immediate complications

## 2023-01-16 NOTE — OP NOTES
Date of service: 1/16/2023  Preoperative diagnosis: Coronary artery disease, severe three-vessel, status post STEMI, status post PCI to the right coronary artery, heavy alcohol use, tobacco abuse, substance use, diabetes mellitus type 2, hypertension  Postop diagnosis: Same  Name of procedure:   Urgent coronary artery bypass graft x3. Left internal mammary artery to left anterior descending artery, saphenous vein graft to the ramus intermedius, saphenous vein graft to the posterior descending branch of the right coronary artery  Endoscopic harvesting of the right greater saphenous vein  Anesthesia: General endotracheal  Anesthesiologist: Dr. Jean Pierre Santos  Intraprocedural complications: None  Estimated blood loss: 300 ml  Surgeon: Cece Le M.D., FACS  Assistant: Robert Duarte PA-C, DEREK Valderrama assisted with endoscopic harvesting of the greater saphenous vein, cannulation establishment of cardiopulmonary bypass, all distal and all proximal anastomosis, decannulation and closure. She was present during the entirety of the procedure due to the complexity of the case. Description:  Patient is taken operating, placed by position on the operating table. General endotracheal anesthesia was induced without incidence. After placement of adequate monitoring lines and catheters, patient's anterior neck anterior chest anterior abdomen bilateral groins bilateral lower extremities were prepped and draped in the usual sterile fashion. Adequate timeout procedures were performed. A median sternotomy skin incision was made using #10 blade. Incision was carried down to the underlying sternum using electrocautery. Sternum was divided midline using sternal saw. Left internal mammary is harvested from the overlying chest wall using the hemoclips and electrocautery. Another surgical team harvested the right greater saphenous vein using endoscopic vein harvest technique.   Patient was heparinized with full dose intravenous heparin. Pericardium was opened. Ascending aorta and right atrium is cannulated. Patient was placed on cardiopulmonary bypass and heart was decompressed. Temperatures let the drift. I first exposed the distal right coronary artery. This was very heavily calcified diffusely diseased vessel. I followed the distal right coronary artery to the posterior descending branch where it was relatively soft but still heavily calcified and diseased vessel. Relatively soft area was identified and a small arteriotomy was made 1.25 mm shunt was inserted. End of the saphenous vein graft was then brought onto the field anastomosed to the posterior descending arteriotomy using 7-0 Prolene in running fashion. Anastomosis was found to be nice hemostatic. I then exposed the ramus intermedius branch. It was adequately mobilized. Small arteriotomy was made. 1.25 mm shunt was inserted. End of the saphenous vein graft was brought onto the field and anastomosed to the ramus intermedius arteriotomy using 7-0 Prolene in running fashion. I then exposed the left anterior descending artery. This was also extremely heavily calcified vessel all the way to the distal third. Anastomosis constructed in the distal third. It was adequately immobilized. Small arteriotomy was made. 1.25 mm shunt was inserted. Left internal mammary artery was then spatulated and anastomosed to the LAD arteriotomy using 7-0 Prolene in a running fashion. This anastomosis was also found to be nice and static. I checked the flow in the left internal mammary artery using a hand-held Doppler probe. It had excellent triphasic flow with excellent diastolic augmentation. I then placed a side-biting clamp in the ascending aorta and 2 punch holes were made using #11 blade and 4.0 mm punch. Proximal anastomosis of the saphenous vein grafts were then anastomosed to the aortotomies using 6-0 Prolene in running fashion.   All distal and proximal anastomoses were checked for hemostasis all found to be nice hemostatic. Patient was then weaned off the cardiopulmonary bypass initially on minimal dose of vasopressor support and then weaned off vasopressor support in normal sinus rhythm with stable hemodynamics. Patient was decannulated in usual fashion and protamine was administered. I placed [address] round Michael drains, 1 into the left pleural cavity, one behind the heart posteriorly, and one anteriorly in the mediastinum. Meticulous hemostasis was then achieved. I applied vancomycin paste to the sternal bone marrow twice during the case. The first 1 being in the beginning of the procedure right after sternotomy and the second 1 before placement of sternal wires. Sternum was approximated using her sternal wires x8. Suprasternal fashion rectus abdominis fascia was approximated using 0 Vicryl in running fashion. Subcutaneous tissue approximated using 0 Vicryl in running fashion. Skin is approximated using 3-0 Monocryl. Patient tolerated procedure very well, was taken into the intensive care in stable intubated condition. Sponge needle instrument counts were correct. There were no complications.

## 2023-01-16 NOTE — CONSULTS
Cardiology Initial Patient Referral Note    Cardiology referral request from Dr. Joe Acosta for evaluation and management/treatment of cardiac management s/p cardiology     Date of  Admission: 1/15/2023 12:47 PM   Primary Care Physician:  Nisha Nolasco MD    Attending Cardiologist: Dr. Fermin Breaux    Assessment:     -CAD, s/p CABGx3 1/16/23, LIMA-LAD, SVG-Ramus intermedius, SVG-posterior descending branch of RCA. -Inferior STEMI 1/15/23 [facility]). Presented with CP, underwent emergent LHC with RCA balloon angioplasty. LHC findings as noted below. Transferred to [facility] for CABG.   100% occluded proximal/mid very calcified large RCA. Baseline CAROLINE 0 flow post angioplasty CAROLINE-3 flow. Due to severe calcification stent is unable to cross the lesion. Moderate-sized ramus intermedius have a 80% calcified ostial/proximal disease. Large sized mid/distal LAD have 80% disease. Large left main artery have ostial 40% to 50% disease in cranial views with nonischemic IFR of 0.96. LVEDP 16 mmHg. -HTN   -DM2   -Active tobacco abuse   -Alcohol abuse. Primary cardiologist is Dr. Loretta Boucher:       I saw, evaluated, examined the patient personally. Patient is currently intubated and sedated. Patient just had emergent CABG  Patient was admitted to [facility] with chest pain. EKG suggested inferior MI. Patient underwent left heart catheterization with RCA occluded. Balloon angioplasty was performed however because of significant calcification unable to deliver stent patient was transferred to [facility] emergently for CABG. Patient underwent CABG successfully x3 with LIMA to LAD, SVG to PDA and SVG to ramus  Patient is intubated and sedated. Being weaned off for extubation  Hemodynamically stable. No evidence of fluid overload. Not on any pressors.   No edema  Telemetry monitor sinus rhythm    Plan for extubation noted  Currently not on any pressors  Echo with preserved EF  Supportive care per CT surgery team.    Significant time spent in reviewing the case, multiple EMR databases, physician notes, reviewing pertinent labs and imaging studies  I spent significant amount of time for medical decision making and updated history, and other providers assessments as well. I personally agree with the findings as stated and the plan as documented. I saw, examined, and evaluated this patient and performed the substantive portion of the encounter for > 50% of the time including extensive history, physical exam, assessment and plan    Merline Marie MD       Continue routine post operative care, per CTS team. Appreciate assistance. Wean pressors as tolerated. Continue ASA, plavix, statin, amiodarone. Final Echo report pending, further medication recommendations pending results. Will continue to follow along with you. History of Present Illness: This is a 60 y.o. male admitted for STEMI (ST elevation myocardial infarction) ([facility] [address] [I21.3]. Patient complains of: post op CABG   Nelson Stevenson is a 60 y.o. male, pmhx as stated above, who we are seeing post CABG. Patient seen post operatively and is currently intubated on ventilator. Patient [facility] on 1[facility]15 with CP in setting of inferior MI. Patient underwent emergent LHC with findings of 3V CAD. He was transferred to [facility] for CABG evaluation. Patient underwent CABGx 3 LIMA-LAD, SVG-Ramus intermedius, SVG-post descending branch of RCA. on 1/16 by Dr. Kumar Ceja. Cardiac risk factors: smoking/ tobacco exposure, diabetes mellitus, male gender, hypertension  Review of Symptoms:    Review of systems not obtained due to patient factors.      Past Medical History:     Past Medical History:   Diagnosis Date    Anginal pain ([facility] [address]     Diabetes ([facility] [address]     new onset 2/2014    High cholesterol     Hypertension          Social History:     Social History     Socioeconomic History    Marital status:    Tobacco Use    Smoking status: Some Days   Vaping Use    Vaping Use: Never used   Substance and Sexual Activity    Alcohol use: No     Comment: drinks daily last at midnight    Drug use: Yes     Types: Marijuana        Family History:   History reviewed. No pertinent family history.      Medications:   No Known Allergies     Current Facility-Administered Medications   Medication Dose Route Frequency    acetaminophen (TYLENOL) 650 mg suppository        vancomycin (VANCOCIN) 1,000 mg injection        acetaminophen (TYLENOL) 325 mg suppository        papaverine 30 mg/mL injection        sodium chloride irrigation 0.9 % 3,000 mL Irrigation    PRN    heparin (porcine) 10,000 Units in 0.9% sodium chloride 1,000 mL Irrigation    PRN    papaverine 240 mg in 0.9% sodium chloride 20 mL Irrigation    PRN    vancomycin (VANCOCIN) injection    PRN    albumin human 5% (BUMINATE) 5 % solution        acetaminophen (TYLENOL) suppository    PRN    acetaminophen (TYLENOL) suppository    PRN    atorvastatin (LIPITOR) tablet 80 mg  80 mg Oral DAILY    pantoprazole (PROTONIX) 40 mg in 0.9% sodium chloride 10 mL injection  40 mg IntraVENous Q24H    sodium chloride (NS) flush 5-40 mL  5-40 mL IntraVENous Q8H    sodium chloride (NS) flush 5-40 mL  5-40 mL IntraVENous PRN    acetaminophen (TYLENOL) tablet 650 mg  650 mg Oral Q6H PRN    Or    acetaminophen (TYLENOL) suppository 650 mg  650 mg Rectal Q6H PRN    polyethylene glycol (MIRALAX) packet 17 g  17 g Oral DAILY PRN    ondansetron (ZOFRAN ODT) tablet 4 mg  4 mg Oral Q8H PRN    Or    ondansetron (ZOFRAN) injection 4 mg  4 mg IntraVENous Q6H PRN    metoprolol (LOPRESSOR) injection 1.25 mg  1.25 mg IntraVENous ONCE    0.9% sodium chloride infusion  75 mL/hr IntraVENous CONTINUOUS    sodium chloride (NS) flush 5-40 mL  5-40 mL IntraVENous Q8H    sodium chloride (NS) flush 5-40 mL  5-40 mL IntraVENous PRN    mupirocin (BACTROBAN) 2 % ointment   Both Nostrils BID    insulin lispro (HUMALOG) injection   SubCUTAneous AC&HS    glucose chewable tablet 16 g  4 Tablet Oral PRN    glucagon (GLUCAGEN) injection 1 mg  1 mg IntraMUSCular PRN    dextrose 10% infusion 0-250 mL  0-250 mL IntraVENous PRN    docusate sodium (COLACE) capsule 100 mg  100 mg Oral BID    polyethylene glycol (MIRALAX) packet 17 g  17 g Oral DAILY    morphine injection 2-4 mg  2-4 mg IntraVENous Q4H PRN    HYDROcodone-acetaminophen (NORCO) 5-325 mg per tablet 1-2 Tablet  1-2 Tablet Oral Q6H PRN    aminocaproic acid (AMICAR) 10 g in 0.9% sodium chloride 500 mL infusion  1 g/hr IntraVENous CONTINUOUS    PHENYLephrine (RICARDO-SYNEPHRINE) 30 mg in 0.9% sodium chloride 250 mL infusion   mcg/min IntraVENous TITRATE    EPINEPHrine (ADRENALIN) 4 mg in 0.9% sodium chloride 250 mL infusion  1-10 mcg/min IntraVENous TITRATE    sodium chloride (NS) flush 5-40 mL  5-40 mL IntraVENous Q8H    sodium chloride (NS) flush 5-40 mL  5-40 mL IntraVENous PRN    LORazepam (ATIVAN) tablet 1 mg  1 mg Oral Q1H PRN    Or    LORazepam (ATIVAN) injection 1 mg  1 mg IntraVENous Q1H PRN    LORazepam (ATIVAN) tablet 2 mg  2 mg Oral Q1H PRN    Or    LORazepam (ATIVAN) injection 2 mg  2 mg IntraVENous Q1H PRN    LORazepam (ATIVAN) injection 3 mg  3 mg IntraVENous [id number] PRN    folic acid (FOLVITE) tablet 1 mg  1 mg Oral DAILY    thiamine HCL (B-1) tablet 100 mg  100 mg Oral DAILY    therapeutic multivitamin (THERAGRAN) tablet 1 Tablet  1 Tablet Oral DAILY    metoprolol tartrate (LOPRESSOR) tablet 12.5 mg  12.5 mg Oral BID     Facility-Administered Medications Ordered in Other Encounters   Medication Dose Route Frequency    midazolam (VERSED) injection   IntraVENous PRN    fentaNYL citrate (PF) injection   IntraVENous PRN    succinylcholine (ANECTINE) injection   IntraVENous PRN    rocuronium injection   IntraVENous PRN    lidocaine (PF) (XYLOCAINE) 20 mg/mL (2 %) injection   IntraVENous PRN    PHENYLephrine (RICARDO-SYNEPHRINE) 10,000 mcg in 0.9% sodium chloride 100 mL infusion   IntraVENous CONTINUOUS    lactated Ringers infusion   IntraVENous CONTINUOUS    mannitol (OSMITROL) 20 % infusion   IntraVENous CONTINUOUS    aminocaproic acid (AMICAR) injection   IntraVENous PRN    aminocaproic acid (AMICAR) 10 g in 0.9% sodium chloride 100 mL infusion   IntraVENous CONTINUOUS    HYDROmorphone (PF) (DILAUDID) injection   IntraVENous PRN    propofoL (DIPRIVAN) 10 mg/mL injection   IntraVENous PRN    heparin (porcine) 1,000 unit/mL injection   IntraVENous PRN    insulin regular (NOVOLIN R, HUMULIN R) 100 Units in 0.9% sodium chloride 100 mL infusion   IntraVENous CONTINUOUS    NOREPINephrine (LEVOPHED) 4,000 mcg in dextrose 5% 250 mL infusion   IntraVENous CONTINUOUS    albumin human 5% (BUMINATE) solution   IntraVENous PRN    PHENYLephrine (RICARDO-SYNEPHRINE) 30 mg in 0.9% sodium chloride 250 mL infusion   IntraVENous CONTINUOUS    protamine injection   IntraVENous PRN    propofol (DIPRIVAN) 10 mg/mL infusion   IntraVENous CONTINUOUS    calcium chloride injection   IntraVENous PRN         Physical Exam:   Visit Vitals  BP (!) 150/96 (BP 1 Location: Left upper arm)   Pulse 81   Temp 98.7 °F (37.1 °C)   Resp 12   SpO2 98%       TELE: SR, NSVT     BP Readings from Last 3 Encounters:   01/16/23 (!) 150/96   01/15/23 (!) 162/88   05/26/21 (!) 153/92     Pulse Readings from Last 3 Encounters:   01/16/23 81   01/15/23 80   05/26/21 77     Wt Readings from Last 3 Encounters:   01/15/23 88.5 kg (195 lb)   05/26/21 90.7 kg (200 lb)   03/01/14 89.4 kg (197 lb)       General:  intubated, sedated on ventilator   Neck:  no JVD appreciated   Lungs:  clear to auscultation bilaterally  Heart:  regular rate and rhythm, S1, S2 normal, no murmur; pericardial friction rub   Abdomen:  abdomen is soft without significant tenderness, masses, organomegaly or guarding  Extremities:  extremities normal, atraumatic, no cyanosis or edema  Skin: Warm and dry.  no hyperpigmentation, vitiligo, or suspicious lesions  Neuro: sedated, no involuntary movements  Psych: unable to assess      Data Review:     Recent Labs     01/16/23  0516 01/15/23  1012 01/15/23  0611   WBC 7.1 8.4 8.3   HGB 15.9 16.3* 15.9   HCT 42.0 44.4 43.6    254 244     Recent Labs     01/16/23  0516 01/15/23  0611   * 129*   K 3.5 3.8   CL 96* 92*   CO2 27 23   * 128*   BUN 5* 4*   CREA 0.67 0.72   CA 9.7 10.0   ALB 3.7 4.0   * 129*   INR  --  0.9       Results for orders placed or performed during the hospital encounter of 01/15/23   EKG, 12 LEAD, INITIAL   Result Value Ref Range    Ventricular Rate 55 BPM    Atrial Rate 55 BPM    P-R Interval 242 ms    QRS Duration 90 ms    Q-T Interval 376 ms    QTC Calculation (Bezet) 359 ms    Calculated P Axis 34 degrees    Calculated R Axis 43 degrees    Calculated T Axis 83 degrees    Diagnosis       Critical Test Result: STEMI  Sinus bradycardia with 1st degree AV block  Inferior infarct , new  Anterior infarct (cited on or before 15-JOHNNY-2023)  *ACUTE MI / STEMI   Consider right ventricular involvement in acute inferior infarct  Abnormal ECG  When compared with ECG of 27-FEB-2014 00:57,  SC interval has increased  Acute Inferior infarct is now present  Confirmed by José Manuel Kenney MD. ([id number]) on 1/15/2023 9:01:01 PM         All Cardiac Markers in the last 24 hours:  No results found for: CPK, CK, CKMMB, CKMB, RCK3, CKMBT, CKNDX, CKND1, YARELIS, TROPT, TROIQ, ARIELA, TROPT, TNIPOC, BNP, BNPP    Last Lipid:    Lab Results   Component Value Date/Time    Cholesterol, total 140 03/01/2014 04:28 AM    HDL Cholesterol 27 (L) 03/01/2014 04:28 AM    LDL, calculated 74.2 03/01/2014 04:28 AM    Triglyceride 194 (H) 03/01/2014 04:28 AM    CHOL/HDL Ratio 5.2 (H) 03/01/2014 04:28 AM       Cardiographics:     EKG Results       Procedure 720 Value Units Date/Time    EKG, 12 LEAD, INITIAL [[id number]]     Order Status: Sent                 01/15/23    CARDIAC PROCEDURE 01/15/2023 (Needs Review) 1/15/2023  This result has not been signed.  Information might be incomplete. Conclusion  Acute inferior STEMI    100% occluded proximal/mid very calcified large RCA. Baseline CAROLINE 0 flow post angioplasty CAROLINE-3 flow. Due to severe calcification stent is unable to cross the lesion. Moderate-sized ramus intermedius have a 80% calcified ostial/proximal disease. Large sized mid/distal LAD have 80% disease. Large left main artery have ostial 40% to 50% disease in cranial views with nonischemic IFR of 0.96. LVEDP 16 mmHg. Recommendation. Multivessel CABG. Discussed with patient and wife both agreed. Signed by: Lynne Kincaid MD on 1/15/2023 10:36 AM      XR Results (most recent):  Results from Hospital Encounter encounter on 01/15/23    XR CHEST PORT    Narrative  EXAM: CHEST RADIOGRAPH    CLINICAL INDICATION/HISTORY: chest pain  > Additional: None    COMPARISON: None    TECHNIQUE: Portable frontal view of the chest    _______________    FINDINGS:    SUPPORT DEVICES: None. HEART AND MEDIASTINUM: Heart size is normal.    LUNGS AND PLEURAL SPACES: No focal consolidation, effusion, or pneumothorax. BONES AND SOFT TISSUES: Unremarkable.    _______________    Impression  No active cardiopulmonary disease.         Signed By: Herminia Taylor PA-C     January 16, 2023

## 2023-01-16 NOTE — PROGRESS NOTES
1301-pt arrived to the unit with OR team. Anesthesiologist and cardiac surgery PA. Pt connected to continuous monitoring, chest tubes verfied. Pt stable at this time. 1320-collected ABG and labs. LYDIA Medina made aware of results. Verbal order given to reduce vent setting to 40% FIo2 and to repeat lactic in 2 hours. 1418-obtained ABG. LYDIA Medina given results. 1430-decreased propofol to 25 mcg.    1500-stopped propofol. Dr. Omid Trujillo at bedside. 1505-LYDIA Medina at bedside. Updates given. 1511-started Nitro drip for SBP 140s and MAP oj 90s. 1525-Pt placed on SBT. 1550-ABG obtained. LYDIA Medina given results. Telephone order given to extubate pt.    1608-pt extubated and placed on 5L nasal cannula. Dr. Omid Trujillo at bedside. 1620-Pt placed on 2L nasal cannula by RT.    1650-obtained post extubation ABG and lactic. LYDIA Medina given results. Telephone order given to stop every 2 hour lactic and repeat in the morning.

## 2023-01-16 NOTE — ANESTHESIA PREPROCEDURE EVALUATION
Relevant Problems   No relevant active problems       Anesthetic History   No history of anesthetic complications            Review of Systems / Medical History  Patient summary reviewed and pertinent labs reviewed    Pulmonary    COPD: mild      Smoker         Neuro/Psych     seizures: well controlled    Psychiatric history    Comments: neuuroopathy  ETOH abuse Cardiovascular    Hypertension          Past MI, CAD and hyperlipidemia    Exercise tolerance: <4 METS  Comments: EF 55%   GI/Hepatic/Renal  Within defined limits              Endo/Other    Diabetes: type 2         Other Findings              Physical Exam    Airway  Mallampati: II  TM Distance: > 6 cm  Neck ROM: normal range of motion   Mouth opening: Normal     Cardiovascular  Regular rate and rhythm,  S1 and S2 normal,  no murmur, click, rub, or gallop             Dental    Dentition: Upper partial plate, Lower partial plate, Loose teeth and Poor dentition     Pulmonary      Decreased breath sounds: bilateral           Abdominal  GI exam deferred       Other Findings            Anesthetic Plan    ASA: 4  Anesthesia type: general    Monitoring Plan: Arterial line, BIS, Continuous noninvasive hemodynamic monitoring, CVP and NICOLASA    Post procedure ventilation     Anesthetic plan and risks discussed with: Patient and Family

## 2023-01-16 NOTE — PROGRESS NOTES
2101-6 [place] [facility]    2105 Pre-op CABG Video #1 and #2 played and viewed by pt. CVT PRE-PROCEDURE CHECKLIST    1. Verify Allergies: No Known Allergies    2. Procedure consent signed by patient and physician and witnessed by nurse: Preethi Manning    3. Anesthesia consent signed by patient and anesthesiologist and witnessed by nurse: Pending and on chart for anesthesia    4. Blood consent signed: Yes   A. Type and cross match done (date):1/15/2023  B. Blood band number(place on right wrist): Yes  C. Number of units available:2  D. Blood transfusion history faxed to blood bank: Yes    5.  2 sheets of patient labels on chart: Yes    6. Patient ID bracelet and allergy bracelet on patient(place on right wrist) and readable: Yes    7. NPO since midnight: 12:00 AM    8. Vital signs obtained within 1 hour of procedure (for CABG's include both arms): Yes. Rt arm 142/85  Lt arm /96 at 0616    9. Clipped per orders at: 0400    10. Bathed with CHG-Chlorhexidine (the night before and the morning of- DOCUMENT) at: 9:15 pm 12/15/23 and 0415 12/16/23    11. All jewelry, glasses, contact lenses, dentures, hearing aides removed: Yes    12. IV access: #1  #18 gauge Lt AC #2 #20 gauge Rt Forearm. must have 2 IV sites at least one must be an #18G    13. DP/ PT pulses marked bilaterally: Yes    14. Anticoagulation stopped 4 hours prior to OR start time: date 1/16/23; time 0500    15. Last beta blocker dose (within 12 hours of OR start) : date 12/16/23; time: 12:12 am    16. Blood Glucose within 60 mins of OR start time (diabetics): Yes    17. Pre-op antibiotic ordered and at bedside to hand to OR staff (do not start) Not applicable    18. Rapid COVID test ordered and sent- put in per protocol if not already done within last 96 hours     19. NO IV's in No arm if radial harvest is expected. Questions-call [phone] for the on call provider      07Ramona in to see  pt and  Echo dept called by Mer Morris for stat complete. Awaiting arrival of Echo tech. 0735-Dr. Sherry Guaman made aware that Echo is in progress at bedside. 0752-To OR this am post Echo procedure.

## 2023-01-16 NOTE — PROGRESS NOTES
CT Surgery  Echo completed at bedside  [facility] for OR without cardiology read per Dr. Rosalee Hall.     Soraida Francois PA-C  [facility]  [phone]

## 2023-01-16 NOTE — ANESTHESIA PROCEDURE NOTES
Arterial Line Placement    Start time: 1/16/2023 9:03 AM  End time: 1/16/2023 9:03 AM  Performed by: Jumana Sanders MD  Authorized by: Jumana Sanders MD     Pre-Procedure  Indications:  Arterial pressure monitoring and blood sampling  Preanesthetic Checklist: patient identified, risks and benefits discussed, anesthesia consent, site marked, timeout performed and fire risk safety assessment completed and verbalized    Timeout Time: 09:03 EST      Procedure:   Prep:  Chlorhexidine  Seldinger Technique?: Yes    Orientation:  Left  Location:  Radial artery  Catheter size:  20 G  Number of attempts:  1  Cont Cardiac Output Sensor: No      Assessment:   Post-procedure:  Line secured and sterile dressing applied  Patient Tolerance:  Patient tolerated the procedure well with no immediate complications

## 2023-01-16 NOTE — ROUTINE PROCESS
TRANSFER - OUT REPORT:    Verbal report given to Mesfin ATKINS(name) on Rishi Ferreira  being transferred to [facility] ICU(unit) for routine post - op       Report consisted of patients Situation, Background, Assessment and   Recommendations(SBAR). Information from the following report(s) SBAR, Kardex, OR Summary, Procedure Summary, and Recent Results was reviewed with the receiving nurse. Lines:   Single Lumen Venous Catheter 01/16/23 Right (Active)       Peripheral IV 01/15/23 Left Antecubital (Active)   Site Assessment Clean, dry, & intact 01/16/23 0813   Phlebitis Assessment 0 01/16/23 0813   Infiltration Assessment 0 01/16/23 0000   Dressing Status Clean, dry, & intact 01/16/23 0813   Dressing Type Transparent;Tape 01/16/23 0813   Hub Color/Line Status Green 01/16/23 0813   Action Taken Open ports on tubing capped 01/16/23 0000   Alcohol Cap Used Yes 01/16/23 0000       Peripheral IV 01/15/23 Right Arm (Active)   Site Assessment Clean, dry, & intact 01/16/23 0813   Phlebitis Assessment 0 01/16/23 0813   Infiltration Assessment 0 01/16/23 0000   Dressing Status Clean, dry, & intact 01/16/23 0000   Dressing Type Transparent;Tape 01/16/23 0813   Hub Color/Line Status Pink 01/16/23 0813   Action Taken Open ports on tubing capped 01/16/23 0000   Alcohol Cap Used Yes 01/16/23 0000       Arterial Line 01/16/23 (Active)       Arterial Line 01/16/23 (Active)        Opportunity for questions and clarification was provided.       Patient transported with:   Monitor  Registered Nurse

## 2023-01-16 NOTE — PROGRESS NOTES
conducted an initial consultation and Spiritual Assessment for Jaci Bowman, who is a 60 y.o.,male. Patients Primary Language is: [place]. According to the patients EMR Hinduism Affiliation is: [facility].     The reason the Patient came to the hospital is:   Patient Active Problem List    Diagnosis Date Noted    [facility] 01/15/2023    Type II or unspecified type diabetes mellitus without mention of complication, uncontrolled 02/28/2014    Hypokalemia 02/28/2014    Essential hypertension, benign 02/28/2014    Hyperosmolar (nonketotic) coma ([facility] 02/27/2014        The  provided the following Interventions:  Initiated a relationship of care and support. Offered prayer and assurance of continued prayers on patient's behalf. Chart reviewed. Assessment:  Patient at the time was intubated and just came out of surgery. Plan:  Chaplains will continue to follow and will provide pastoral care on an as needed/requested basis.  recommends bedside caregivers page  on duty if patient shows signs of acute spiritual or emotional distress.     [address]  [phone])

## 2023-01-16 NOTE — PROGRESS NOTES
TRANSFER - IN REPORT:    Verbal report received from [address] GABINO Curry RN on [place]  being received from [facility] ICU for ordered procedure      Report consisted of patients Situation, Background, Assessment and   Recommendations(SBAR). Information from the following report(s) SBAR, Intake/Output, MAR, and Pre Procedure Checklist was reviewed with the receiving nurse. Opportunity for questions and clarification was provided. Assessment completed upon patients arrival to unit and care assumed.

## 2023-01-17 ENCOUNTER — APPOINTMENT (OUTPATIENT)
Dept: GENERAL RADIOLOGY | Age: 63
DRG: 235 | End: 2023-01-17
Attending: PHYSICIAN ASSISTANT
Payer: OTHER GOVERNMENT

## 2023-01-17 ENCOUNTER — APPOINTMENT (OUTPATIENT)
Dept: GENERAL RADIOLOGY | Age: 63
DRG: 235 | End: 2023-01-17
Attending: THORACIC SURGERY (CARDIOTHORACIC VASCULAR SURGERY)
Payer: OTHER GOVERNMENT

## 2023-01-17 LAB
ACT BLD: 137 SECS (ref 79–138)
ACT BLD: 137 SECS (ref 79–138)
ACT BLD: 492 SECS (ref 79–138)
ACT BLD: 672 SECS (ref 79–138)
ACT BLD: 696 SECS (ref 79–138)
ACT BLD: 762 SECS (ref 79–138)
ACT BLD: 835 SECS (ref 79–138)
ALBUMIN SERPL-MCNC: 3.7 G/DL (ref 3.4–5)
ALBUMIN/GLOB SERPL: 1.4 (ref 0.8–1.7)
ALP SERPL-CCNC: 53 U/L (ref 45–117)
ALT SERPL-CCNC: 49 U/L (ref 16–61)
ANION GAP BLD CALC-SCNC: 13 (ref 10–20)
ANION GAP SERPL CALC-SCNC: 2 MMOL/L (ref 3–18)
ANION GAP SERPL CALC-SCNC: 5 MMOL/L (ref 3–18)
AST SERPL-CCNC: 55 U/L (ref 10–38)
ATRIAL RATE: 87 BPM
BASE DEFICIT BLD-SCNC: 0.6 MMOL/L
BASOPHILS # BLD: 0 K/UL (ref 0–0.1)
BASOPHILS NFR BLD: 0 % (ref 0–2)
BILIRUB DIRECT SERPL-MCNC: 0.4 MG/DL (ref 0–0.2)
BILIRUB SERPL-MCNC: 1.3 MG/DL (ref 0.2–1)
BLD PROD TYP BPU: NORMAL
BPU ID: NORMAL
BUN SERPL-MCNC: 6 MG/DL (ref 7–18)
BUN SERPL-MCNC: 8 MG/DL (ref 7–18)
BUN/CREAT SERPL: 14 (ref 12–20)
BUN/CREAT SERPL: 8 (ref 12–20)
CA-I BLD-MCNC: 1.15 MMOL/L (ref 1.12–1.32)
CALCIUM SERPL-MCNC: 8.6 MG/DL (ref 8.5–10.1)
CALCIUM SERPL-MCNC: 8.6 MG/DL (ref 8.5–10.1)
CALCULATED P AXIS, ECG09: -3 DEGREES
CALCULATED R AXIS, ECG10: -22 DEGREES
CALCULATED T AXIS, ECG11: -47 DEGREES
CHLORIDE BLD-SCNC: 104 MMOL/L (ref 98–107)
CHLORIDE SERPL-SCNC: 104 MMOL/L (ref 100–111)
CHLORIDE SERPL-SCNC: 107 MMOL/L (ref 100–111)
CO2 BLD-SCNC: 22 MMOL/L (ref 19–24)
CO2 SERPL-SCNC: 25 MMOL/L (ref 21–32)
CO2 SERPL-SCNC: 27 MMOL/L (ref 21–32)
CREAT BLD-MCNC: 0.74 MG/DL (ref 0.6–1.3)
CREAT SERPL-MCNC: 0.58 MG/DL (ref 0.6–1.3)
CREAT SERPL-MCNC: 0.74 MG/DL (ref 0.6–1.3)
DIAGNOSIS, 93000: NORMAL
DIFFERENTIAL METHOD BLD: ABNORMAL
EOSINOPHIL # BLD: 0 K/UL (ref 0–0.4)
EOSINOPHIL NFR BLD: 0 % (ref 0–5)
ERYTHROCYTE [DISTWIDTH] IN BLOOD BY AUTOMATED COUNT: 13 % (ref 11.6–14.5)
GLOBULIN SER CALC-MCNC: 2.7 G/DL (ref 2–4)
GLUCOSE BLD STRIP.AUTO-MCNC: 113 MG/DL (ref 70–110)
GLUCOSE BLD STRIP.AUTO-MCNC: 114 MG/DL (ref 70–110)
GLUCOSE BLD STRIP.AUTO-MCNC: 118 MG/DL (ref 70–110)
GLUCOSE BLD STRIP.AUTO-MCNC: 118 MG/DL (ref 70–110)
GLUCOSE BLD STRIP.AUTO-MCNC: 129 MG/DL (ref 70–110)
GLUCOSE BLD STRIP.AUTO-MCNC: 132 MG/DL (ref 70–110)
GLUCOSE BLD STRIP.AUTO-MCNC: 136 MG/DL (ref 70–110)
GLUCOSE BLD STRIP.AUTO-MCNC: 136 MG/DL (ref 70–110)
GLUCOSE BLD STRIP.AUTO-MCNC: 96 MG/DL (ref 70–110)
GLUCOSE BLD-MCNC: 151 MG/DL (ref 65–100)
GLUCOSE SERPL-MCNC: 113 MG/DL (ref 74–99)
GLUCOSE SERPL-MCNC: 129 MG/DL (ref 74–99)
HBV SURFACE AG SER QL: <0.1 INDEX
HBV SURFACE AG SER QL: NEGATIVE
HCO3 BLD-SCNC: 23.1 MMOL/L (ref 22–26)
HCT VFR BLD AUTO: 27.9 % (ref 36–48)
HGB BLD-MCNC: 10.1 G/DL (ref 13–16)
IMM GRANULOCYTES # BLD AUTO: 0 K/UL (ref 0–0.04)
IMM GRANULOCYTES NFR BLD AUTO: 1 % (ref 0–0.5)
LACTATE BLD-SCNC: 1.27 MMOL/L (ref 0.4–2)
LACTATE SERPL-SCNC: 2.5 MMOL/L (ref 0.4–2)
LYMPHOCYTES # BLD: 0.9 K/UL (ref 0.9–3.6)
LYMPHOCYTES NFR BLD: 14 % (ref 21–52)
MAGNESIUM SERPL-MCNC: 1.9 MG/DL (ref 1.6–2.6)
MCH RBC QN AUTO: 32.8 PG (ref 24–34)
MCHC RBC AUTO-ENTMCNC: 36.2 G/DL (ref 31–37)
MCV RBC AUTO: 90.6 FL (ref 78–100)
MONOCYTES # BLD: 0.6 K/UL (ref 0.05–1.2)
MONOCYTES NFR BLD: 10 % (ref 3–10)
NEUTS SEG # BLD: 4.7 K/UL (ref 1.8–8)
NEUTS SEG NFR BLD: 75 % (ref 40–73)
NRBC # BLD: 0 K/UL (ref 0–0.01)
NRBC BLD-RTO: 0 PER 100 WBC
P-R INTERVAL, ECG05: 156 MS
PCO2 BLD: 33.6 MMHG (ref 35–45)
PH BLD: 7.45 (ref 7.35–7.45)
PLATELET # BLD AUTO: 142 K/UL (ref 135–420)
PMV BLD AUTO: 8.9 FL (ref 9.2–11.8)
PO2 BLD: 56 MMHG (ref 80–100)
POTASSIUM BLD-SCNC: 3.6 MMOL/L (ref 3.5–5.1)
POTASSIUM SERPL-SCNC: 3.8 MMOL/L (ref 3.5–5.5)
POTASSIUM SERPL-SCNC: 3.8 MMOL/L (ref 3.5–5.5)
PROT SERPL-MCNC: 6.4 G/DL (ref 6.4–8.2)
Q-T INTERVAL, ECG07: 378 MS
QRS DURATION, ECG06: 86 MS
QTC CALCULATION (BEZET), ECG08: 454 MS
RBC # BLD AUTO: 3.08 M/UL (ref 4.35–5.65)
SAO2 % BLD: 90 %
SERVICE CMNT-IMP: ABNORMAL
SODIUM BLD-SCNC: 138 MMOL/L (ref 136–145)
SODIUM SERPL-SCNC: 134 MMOL/L (ref 136–145)
SODIUM SERPL-SCNC: 136 MMOL/L (ref 136–145)
SPECIMEN SITE: ABNORMAL
STATUS OF UNIT,%ST: NORMAL
UNIT DIVISION, %UDIV: 0
VENTRICULAR RATE, ECG03: 87 BPM
WBC # BLD AUTO: 6.3 K/UL (ref 4.6–13.2)

## 2023-01-17 PROCEDURE — 93005 ELECTROCARDIOGRAM TRACING: CPT

## 2023-01-17 PROCEDURE — 83735 ASSAY OF MAGNESIUM: CPT

## 2023-01-17 PROCEDURE — 74018 RADEX ABDOMEN 1 VIEW: CPT

## 2023-01-17 PROCEDURE — 82962 GLUCOSE BLOOD TEST: CPT

## 2023-01-17 PROCEDURE — P9045 ALBUMIN (HUMAN), 5%, 250 ML: HCPCS | Performed by: THORACIC SURGERY (CARDIOTHORACIC VASCULAR SURGERY)

## 2023-01-17 PROCEDURE — 74011250637 HC RX REV CODE- 250/637: Performed by: PHYSICIAN ASSISTANT

## 2023-01-17 PROCEDURE — 74011250637 HC RX REV CODE- 250/637: Performed by: INTERNAL MEDICINE

## 2023-01-17 PROCEDURE — 94762 N-INVAS EAR/PLS OXIMTRY CONT: CPT

## 2023-01-17 PROCEDURE — 97166 OT EVAL MOD COMPLEX 45 MIN: CPT

## 2023-01-17 PROCEDURE — 74011636637 HC RX REV CODE- 636/637: Performed by: PHYSICIAN ASSISTANT

## 2023-01-17 PROCEDURE — 85025 COMPLETE CBC W/AUTO DIFF WBC: CPT

## 2023-01-17 PROCEDURE — 74011250636 HC RX REV CODE- 250/636: Performed by: PHYSICIAN ASSISTANT

## 2023-01-17 PROCEDURE — 80076 HEPATIC FUNCTION PANEL: CPT

## 2023-01-17 PROCEDURE — 97530 THERAPEUTIC ACTIVITIES: CPT

## 2023-01-17 PROCEDURE — 82947 ASSAY GLUCOSE BLOOD QUANT: CPT

## 2023-01-17 PROCEDURE — 65620000000 HC RM CCU GENERAL

## 2023-01-17 PROCEDURE — 74011000250 HC RX REV CODE- 250: Performed by: PHYSICIAN ASSISTANT

## 2023-01-17 PROCEDURE — APPNB45 APP NON BILLABLE 31-45 MINUTES: Performed by: PHYSICIAN ASSISTANT

## 2023-01-17 PROCEDURE — 80048 BASIC METABOLIC PNL TOTAL CA: CPT

## 2023-01-17 PROCEDURE — 77010033678 HC OXYGEN DAILY

## 2023-01-17 PROCEDURE — 74011250637 HC RX REV CODE- 250/637: Performed by: THORACIC SURGERY (CARDIOTHORACIC VASCULAR SURGERY)

## 2023-01-17 PROCEDURE — 71045 X-RAY EXAM CHEST 1 VIEW: CPT

## 2023-01-17 PROCEDURE — 74011250636 HC RX REV CODE- 250/636

## 2023-01-17 PROCEDURE — 74011000258 HC RX REV CODE- 258: Performed by: PHYSICIAN ASSISTANT

## 2023-01-17 PROCEDURE — 99024 POSTOP FOLLOW-UP VISIT: CPT | Performed by: PHYSICIAN ASSISTANT

## 2023-01-17 PROCEDURE — 97162 PT EVAL MOD COMPLEX 30 MIN: CPT

## 2023-01-17 PROCEDURE — 83605 ASSAY OF LACTIC ACID: CPT

## 2023-01-17 PROCEDURE — C9113 INJ PANTOPRAZOLE SODIUM, VIA: HCPCS | Performed by: PHYSICIAN ASSISTANT

## 2023-01-17 PROCEDURE — 74011250636 HC RX REV CODE- 250/636: Performed by: THORACIC SURGERY (CARDIOTHORACIC VASCULAR SURGERY)

## 2023-01-17 PROCEDURE — 99232 SBSQ HOSP IP/OBS MODERATE 35: CPT | Performed by: INTERNAL MEDICINE

## 2023-01-17 PROCEDURE — 97116 GAIT TRAINING THERAPY: CPT

## 2023-01-17 RX ORDER — ALBUMIN HUMAN 50 G/1000ML
25 SOLUTION INTRAVENOUS ONCE
Status: COMPLETED | OUTPATIENT
Start: 2023-01-17 | End: 2023-01-17

## 2023-01-17 RX ORDER — INSULIN LISPRO 100 [IU]/ML
INJECTION, SOLUTION INTRAVENOUS; SUBCUTANEOUS
Status: DISCONTINUED | OUTPATIENT
Start: 2023-01-17 | End: 2023-01-21 | Stop reason: HOSPADM

## 2023-01-17 RX ORDER — DEXTROSE MONOHYDRATE 100 MG/ML
0-250 INJECTION, SOLUTION INTRAVENOUS AS NEEDED
Status: DISCONTINUED | OUTPATIENT
Start: 2023-01-17 | End: 2023-01-17 | Stop reason: SDUPTHER

## 2023-01-17 RX ORDER — FACIAL-BODY WIPES
10 EACH TOPICAL DAILY
Status: DISPENSED | OUTPATIENT
Start: 2023-01-17 | End: 2023-01-20

## 2023-01-17 RX ORDER — POTASSIUM CHLORIDE 20 MEQ/1
20 TABLET, EXTENDED RELEASE ORAL
Status: COMPLETED | OUTPATIENT
Start: 2023-01-17 | End: 2023-01-17

## 2023-01-17 RX ORDER — INSULIN GLARGINE 100 [IU]/ML
10 INJECTION, SOLUTION SUBCUTANEOUS ONCE
Status: COMPLETED | OUTPATIENT
Start: 2023-01-17 | End: 2023-01-17

## 2023-01-17 RX ORDER — POTASSIUM CHLORIDE 14.9 MG/ML
20 INJECTION INTRAVENOUS
Status: COMPLETED | OUTPATIENT
Start: 2023-01-17 | End: 2023-01-17

## 2023-01-17 RX ORDER — CLOPIDOGREL BISULFATE 75 MG/1
75 TABLET ORAL DAILY
Status: DISCONTINUED | OUTPATIENT
Start: 2023-01-17 | End: 2023-01-21 | Stop reason: HOSPADM

## 2023-01-17 RX ORDER — METOCLOPRAMIDE HYDROCHLORIDE 5 MG/ML
10 INJECTION INTRAMUSCULAR; INTRAVENOUS EVERY 8 HOURS
Status: COMPLETED | OUTPATIENT
Start: 2023-01-17 | End: 2023-01-18

## 2023-01-17 RX ORDER — HYDROCODONE BITARTRATE AND ACETAMINOPHEN 5; 325 MG/1; MG/1
1-2 TABLET ORAL
Status: DISCONTINUED | OUTPATIENT
Start: 2023-01-17 | End: 2023-01-21 | Stop reason: HOSPADM

## 2023-01-17 RX ORDER — IBUPROFEN 200 MG
4 TABLET ORAL AS NEEDED
Status: DISCONTINUED | OUTPATIENT
Start: 2023-01-17 | End: 2023-01-21 | Stop reason: HOSPADM

## 2023-01-17 RX ORDER — MAGNESIUM SULFATE HEPTAHYDRATE 40 MG/ML
2 INJECTION, SOLUTION INTRAVENOUS ONCE
Status: COMPLETED | OUTPATIENT
Start: 2023-01-17 | End: 2023-01-17

## 2023-01-17 RX ORDER — TAMSULOSIN HYDROCHLORIDE 0.4 MG/1
0.4 CAPSULE ORAL DAILY
Status: DISCONTINUED | OUTPATIENT
Start: 2023-01-17 | End: 2023-01-21 | Stop reason: HOSPADM

## 2023-01-17 RX ORDER — LANOLIN ALCOHOL/MO/W.PET/CERES
1000 CREAM (GRAM) TOPICAL DAILY
Status: DISCONTINUED | OUTPATIENT
Start: 2023-01-17 | End: 2023-01-21 | Stop reason: HOSPADM

## 2023-01-17 RX ADMIN — SODIUM CHLORIDE 1.4 UNITS/HR: 9 INJECTION, SOLUTION INTRAVENOUS at 08:00

## 2023-01-17 RX ADMIN — CLOPIDOGREL BISULFATE 75 MG: 75 TABLET ORAL at 08:06

## 2023-01-17 RX ADMIN — AMIODARONE HYDROCHLORIDE 1 MG/MIN: 1.8 INJECTION, SOLUTION INTRAVENOUS at 02:59

## 2023-01-17 RX ADMIN — Medication 100 MG: at 08:06

## 2023-01-17 RX ADMIN — MORPHINE SULFATE 4 MG: 2 INJECTION, SOLUTION INTRAMUSCULAR; INTRAVENOUS at 03:00

## 2023-01-17 RX ADMIN — CEFAZOLIN 2 G: 1 INJECTION, POWDER, FOR SOLUTION INTRAMUSCULAR; INTRAVENOUS at 14:17

## 2023-01-17 RX ADMIN — AMIODARONE HYDROCHLORIDE 200 MG: 200 TABLET ORAL at 15:57

## 2023-01-17 RX ADMIN — THERA TABS 1 TABLET: TAB at 08:06

## 2023-01-17 RX ADMIN — METOPROLOL TARTRATE 12.5 MG: 25 TABLET, FILM COATED ORAL at 08:06

## 2023-01-17 RX ADMIN — MORPHINE SULFATE 4 MG: 2 INJECTION, SOLUTION INTRAMUSCULAR; INTRAVENOUS at 10:51

## 2023-01-17 RX ADMIN — HYDROCODONE BITARTRATE AND ACETAMINOPHEN 2 TABLET: 5; 325 TABLET ORAL at 14:13

## 2023-01-17 RX ADMIN — HEPARIN SODIUM 5000 UNITS: 5000 INJECTION INTRAVENOUS; SUBCUTANEOUS at 22:10

## 2023-01-17 RX ADMIN — Medication 10 UNITS: at 08:08

## 2023-01-17 RX ADMIN — AMIODARONE HYDROCHLORIDE 150 MG: 1.5 INJECTION, SOLUTION INTRAVENOUS at 02:23

## 2023-01-17 RX ADMIN — HEPARIN SODIUM 5000 UNITS: 5000 INJECTION INTRAVENOUS; SUBCUTANEOUS at 15:57

## 2023-01-17 RX ADMIN — SODIUM CHLORIDE, PRESERVATIVE FREE 10 ML: 5 INJECTION INTRAVENOUS at 14:18

## 2023-01-17 RX ADMIN — POTASSIUM CHLORIDE 20 MEQ: 14.9 INJECTION, SOLUTION INTRAVENOUS at 05:00

## 2023-01-17 RX ADMIN — MAGNESIUM SULFATE HEPTAHYDRATE 2 G: 40 INJECTION, SOLUTION INTRAVENOUS at 08:08

## 2023-01-17 RX ADMIN — AMIODARONE HYDROCHLORIDE 200 MG: 200 TABLET ORAL at 22:10

## 2023-01-17 RX ADMIN — HYDROCODONE BITARTRATE AND ACETAMINOPHEN 2 TABLET: 5; 325 TABLET ORAL at 23:00

## 2023-01-17 RX ADMIN — SODIUM CHLORIDE, PRESERVATIVE FREE 40 MG: 5 INJECTION INTRAVENOUS at 14:17

## 2023-01-17 RX ADMIN — OXYCODONE HYDROCHLORIDE 10 MG: 5 TABLET ORAL at 08:07

## 2023-01-17 RX ADMIN — SODIUM CHLORIDE 10 ML/HR: 9 INJECTION, SOLUTION INTRAVENOUS at 14:16

## 2023-01-17 RX ADMIN — CYANOCOBALAMIN TAB 1000 MCG 1000 MCG: 1000 TAB at 08:06

## 2023-01-17 RX ADMIN — HYDROCODONE BITARTRATE AND ACETAMINOPHEN 2 TABLET: 5; 325 TABLET ORAL at 05:06

## 2023-01-17 RX ADMIN — DOCUSATE SODIUM 100 MG: 100 CAPSULE, LIQUID FILLED ORAL at 17:04

## 2023-01-17 RX ADMIN — POLYETHYLENE GLYCOL 3350 17 G: 17 POWDER, FOR SOLUTION ORAL at 08:07

## 2023-01-17 RX ADMIN — MUPIROCIN: 20 OINTMENT TOPICAL at 17:03

## 2023-01-17 RX ADMIN — FOLIC ACID 1 MG: 1 TABLET ORAL at 08:06

## 2023-01-17 RX ADMIN — DOCUSATE SODIUM 100 MG: 100 CAPSULE, LIQUID FILLED ORAL at 08:08

## 2023-01-17 RX ADMIN — MUPIROCIN: 20 OINTMENT TOPICAL at 08:16

## 2023-01-17 RX ADMIN — ATORVASTATIN CALCIUM 80 MG: 40 TABLET, FILM COATED ORAL at 08:06

## 2023-01-17 RX ADMIN — ASPIRIN 81 MG: 81 TABLET, COATED ORAL at 08:06

## 2023-01-17 RX ADMIN — POTASSIUM CHLORIDE 20 MEQ: 14.9 INJECTION, SOLUTION INTRAVENOUS at 02:30

## 2023-01-17 RX ADMIN — MORPHINE SULFATE 4 MG: 2 INJECTION, SOLUTION INTRAMUSCULAR; INTRAVENOUS at 17:02

## 2023-01-17 RX ADMIN — SODIUM CHLORIDE, PRESERVATIVE FREE 10 ML: 5 INJECTION INTRAVENOUS at 22:10

## 2023-01-17 RX ADMIN — TAMSULOSIN HYDROCHLORIDE 0.4 MG: 0.4 CAPSULE ORAL at 16:58

## 2023-01-17 RX ADMIN — METOCLOPRAMIDE 10 MG: 5 INJECTION, SOLUTION INTRAMUSCULAR; INTRAVENOUS at 16:58

## 2023-01-17 RX ADMIN — AMIODARONE HYDROCHLORIDE 200 MG: 200 TABLET ORAL at 08:06

## 2023-01-17 RX ADMIN — CHLORHEXIDINE GLUCONATE 0.12% ORAL RINSE 10 ML: 1.2 LIQUID ORAL at 08:07

## 2023-01-17 RX ADMIN — ALBUMIN (HUMAN) 25 G: 12.5 INJECTION, SOLUTION INTRAVENOUS at 14:14

## 2023-01-17 RX ADMIN — METOCLOPRAMIDE 10 MG: 5 INJECTION, SOLUTION INTRAMUSCULAR; INTRAVENOUS at 22:10

## 2023-01-17 RX ADMIN — SODIUM CHLORIDE, PRESERVATIVE FREE 10 ML: 5 INJECTION INTRAVENOUS at 08:24

## 2023-01-17 RX ADMIN — CEFAZOLIN 2 G: 1 INJECTION, POWDER, FOR SOLUTION INTRAMUSCULAR; INTRAVENOUS at 22:10

## 2023-01-17 RX ADMIN — BISACODYL 10 MG: 5 TABLET, COATED ORAL at 08:06

## 2023-01-17 RX ADMIN — POTASSIUM CHLORIDE 20 MEQ: 1500 TABLET, EXTENDED RELEASE ORAL at 08:06

## 2023-01-17 RX ADMIN — METOPROLOL TARTRATE 12.5 MG: 25 TABLET, FILM COATED ORAL at 17:00

## 2023-01-17 RX ADMIN — IRON SUCROSE 200 MG: 20 INJECTION, SOLUTION INTRAVENOUS at 08:06

## 2023-01-17 RX ADMIN — OXYCODONE HYDROCHLORIDE 10 MG: 5 TABLET ORAL at 01:07

## 2023-01-17 RX ADMIN — BISACODYL 10 MG: 10 SUPPOSITORY RECTAL at 16:57

## 2023-01-17 RX ADMIN — CHLORHEXIDINE GLUCONATE 0.12% ORAL RINSE 10 ML: 1.2 LIQUID ORAL at 17:03

## 2023-01-17 RX ADMIN — AMIODARONE HYDROCHLORIDE 1 MG/MIN: 1.8 INJECTION, SOLUTION INTRAVENOUS at 03:00

## 2023-01-17 RX ADMIN — CEFAZOLIN 2 G: 1 INJECTION, POWDER, FOR SOLUTION INTRAMUSCULAR; INTRAVENOUS at 05:05

## 2023-01-17 NOTE — PROGRESS NOTES
Pt awake, alert, oriented, and appropriate at start of shift. Urine output tapered but remained adaquate overnight    Arterial line seemed shorter than normal and pulled when patients arm layed by his side, even with the transducer connected to the bed. Around [phone] patient reached for his remote and the arterial line pulled out of his sutures. Held pressure, cut and removed sutures, and redressed the site. Pt went into Afib at 0210, Gave Amiodarone bolus and started drip at 1mg/min per PA. Also performed ABG and gave 40mEq potassium chloride, titrated O2 up to 2L NC to correct pO2 of 55. Afib resolved at 0340. Chest tube output still looks sanguinous, output was normal.     BP maintained without pressors    Did well standing and moving into the chair.      Bathed with CHG, up to chair

## 2023-01-17 NOTE — PROGRESS NOTES
Cardiology Progress Note    Admit Date: 1/15/2023  Attending Cardiologist: Dr. Beto Graves     Assessment:     -CAD, s/p CABGx3 1/16/23, LIMA-LAD, SVG-Ramus intermedius, SVG-posterior descending branch of RCA. -Inferior STEMI 1/15/23 [facility]). Presented with CP, underwent emergent LHC with RCA balloon angioplasty. LHC findings as noted below. Transferred to [facility] for CABG.   100% occluded proximal/mid very calcified large RCA. Baseline CAROLINE 0 flow post angioplasty CAROLINE-3 flow. Due to severe calcification stent is unable to cross the lesion. Moderate-sized ramus intermedius have a 80% calcified ostial/proximal disease. Large sized mid/distal LAD have 80% disease. Large left main artery have ostial 40% to 50% disease in cranial views with nonischemic IFR of 0.96. LVEDP 16 mmHg. -Transient atrial fibrillation: Postoperatively on day 1. -HTN   -DM2   -Active tobacco abuse. -Alcohol abuse. Primary cardiologist is Dr. Ezekiel Gonzalez:       I saw, evaluated, interviewed and examined the patient personally. Denies any chest pain. She is sitting in the recliner without any distress  Decreased breath sound. No obvious murmur. No significant edema  Overnight patient had asymptomatic atrial fibrillation for which IV amiodarone bolus was given followed by oral amiodarone  Currently patient in sinus rhythm. Continue aspirin, atorvastatin, Plavix    Beto Graves MD       POD #1, extubated and doing well. Continue routine post operative care per CTS team.   Increase activity as tolerated. Encourage ICS. Transient episode of AF overnight. Currently in SR s/p amio gtt. BP stable currently, not requiring pressor support. Subjective:     Sitting in recliner in NAD.      Objective:      Patient Vitals for the past 8 hrs:   Temp Pulse Resp BP SpO2   01/17/23 1300 -- (!) 101 27 135/88 96 %   01/17/23 1200 98.2 °F (36.8 °C) 88 23 137/89 95 %   01/17/23 1100 -- 82 20 128/84 94 %   01/17/23 1000 -- 84 27 122/79 92 %   01/17/23 0900 -- 85 (!) 31 (!) 114/98 94 %   01/17/23 0800 97.6 °F (36.4 °C) 89 20 109/76 97 %   01/17/23 0700 -- 86 25 127/79 95 %         Patient Vitals for the past 96 hrs:   Weight   01/17/23 0530 81.8 kg (180 lb 6.4 oz)       TELE: SR               Current Facility-Administered Medications   Medication Dose Route Frequency Last Admin    insulin lispro (HUMALOG) injection   SubCUTAneous AC&HS      glucose chewable tablet 16 g  4 Tablet Oral PRN      glucagon (GLUCAGEN) injection 1 mg  1 mg IntraMUSCular PRN      dextrose 10% infusion 0-250 mL  0-250 mL IntraVENous PRN      cyanocobalamin tablet 1,000 mcg  1,000 mcg Oral DAILY 1,000 mcg at 01/17/23 0806    iron sucrose (VENOFER) injection 200 mg  200 mg IntraVENous Q24H 200 mg at 01/17/23 0806    clopidogreL (PLAVIX) tablet 75 mg  75 mg Oral DAILY 75 mg at 01/17/23 0806    0.9% sodium chloride infusion  10 mL/hr IntraVENous CONTINUOUS 10 mL/hr at 01/16/23 1431    albumin human 5% (BUMINATE) solution 12.5 g  12.5 g IntraVENous Q20MIN PRN 12.5 g at 01/16/23 2353    sodium chloride (NS) flush 5-40 mL  5-40 mL IntraVENous Q8H 10 mL at 01/17/23 0824    sodium chloride (NS) flush 5-40 mL  5-40 mL IntraVENous PRN      acetaminophen (TYLENOL) tablet 650 mg  650 mg Oral Q4H PRN      HYDROcodone-acetaminophen (NORCO) 5-325 mg per tablet 1-2 Tablet  1-2 Tablet Oral Q6H PRN 2 Tablet at 01/17/23 0506    morphine injection 2-4 mg  2-4 mg IntraVENous Q4H PRN 4 mg at 01/17/23 1051    naloxone (NARCAN) injection 0.4 mg  0.4 mg IntraVENous PRN      mupirocin (BACTROBAN) 2 % ointment   Both Nostrils BID Given at 01/17/23 0816    ceFAZolin (ANCEF) 2 g in sterile water (preservative free) 20 mL IV syringe  2 g IntraVENous Q8H 2 g at 01/17/23 0505    amiodarone (CORDARONE) tablet 200 mg  200 mg Oral  mg at 01/17/23 0806    albuterol (PROVENTIL VENTOLIN) nebulizer solution 2.5 mg  2.5 mg Nebulization Q4H PRN      ondansetron (ZOFRAN) injection 4 mg  4 mg IntraVENous Q4H PRN      aspirin delayed-release tablet 81 mg  81 mg Oral DAILY 81 mg at 01/17/23 0806    chlorhexidine (PERIDEX) 0.12 % mouthwash 10 mL  10 mL Oral BID 10 mL at 01/17/23 0807    docusate sodium (COLACE) capsule 100 mg  100 mg Oral  mg at 01/17/23 7348    ELECTROLYTE REPLACEMENT PROTOCOL - Potassium Standard Dosing  1 Each Other PRN      ELECTROLYTE REPLACEMENT PROTOCOL - Magnesium  1 Each Other PRN      heparin (porcine) injection 5,000 Units  5,000 Units SubCUTAneous Q8H      dextrose 10% infusion 0-250 mL  0-250 mL IntraVENous PRN      bisacodyL (DULCOLAX) tablet 10 mg  10 mg Oral DAILY 10 mg at 01/17/23 0806    fentaNYL citrate (PF) injection 12.5-25 mcg  12.5-25 mcg IntraVENous Q15MIN PRN 25 mcg at 01/16/23 1857    polyethylene glycol (MIRALAX) packet 17 g  17 g Oral DAILY 17 g at 01/17/23 0807    insulin regular (MYXREDLIN, NOVOLIN, HUMULIN) 100 units/100 ml NS infusion (premix)  0-50 Units/hr IntraVENous TITRATE 1.4 Units/hr at 01/17/23 1204    atorvastatin (LIPITOR) tablet 80 mg  80 mg Oral DAILY 80 mg at 01/17/23 0806    pantoprazole (PROTONIX) 40 mg in 0.9% sodium chloride 10 mL injection  40 mg IntraVENous Q24H 40 mg at 09/51/41 4034    folic acid (FOLVITE) tablet 1 mg  1 mg Oral DAILY 1 mg at 01/17/23 9362    thiamine HCL (B-1) tablet 100 mg  100 mg Oral DAILY 100 mg at 01/17/23 0806    therapeutic multivitamin [facility]) tablet 1 Tablet  1 Tablet Oral DAILY 1 Tablet at 01/17/23 0806    metoprolol tartrate (LOPRESSOR) tablet 12.5 mg  12.5 mg Oral BID 12.5 mg at 01/17/23 9765         Intake/Output Summary (Last 24 hours) at 1/17/2023 1344  Last data filed at 1/17/2023 1300  Gross per 24 hour   Intake 3096.55 ml   Output 3870 ml   Net -773.45 ml       Physical Exam:  General:  alert, cooperative, no distress, appears stated age  Neck:  no JVD  Lungs:  diminished B/L   Heart:  RRR  Abdomen:  abdomen is soft without significant tenderness, masses, organomegaly or guarding  Extremities:  extremities normal, atraumatic, no cyanosis or edema    Visit Vitals  /88   Pulse (!) 101   Temp 98.2 °F (36.8 °C)   Resp 27   Ht 6' 1\" (1.854 m)   Wt 81.8 kg (180 lb 6.4 oz)   SpO2 96%   BMI 23.80 kg/m²       Data Review:     Labs: Results:       Chemistry Recent Labs     01/17/23  0630 01/16/23  1320 01/16/23  0516   * 104* 119*    137 129*   K 3.8 3.8 3.5    102 96*   CO2 27 26 27   BUN 6* 5* 5*   CREA 0.74 0.81 0.67   CA 8.6 8.8 9.7   MG 1.9 2.6  --    AGAP 2* 9 6   BUCR 8* 6* 7*   AP 53 61 87   TP 6.4 5.5* 7.7   ALB 3.7 3.3* 3.7   GLOB 2.7 2.2 4.0   AGRAT 1.4 1.5 0.9      CBC w/Diff Recent Labs     01/17/23  0630 01/16/23  1320 01/16/23  0516   WBC 6.3 10.1 7.1   RBC 3.08* 3.38* 4.81   HGB 10.1* 11.2* 15.9   HCT 27.9* 29.7* 42.0    132* 233   GRANS 75* 77* 63   LYMPH 14* 15* 23   EOS 0 1 1      Cardiac Enzymes No results found for: CPK, CK, CKMMB, CKMB, RCK3, CKMBT, CKNDX, CKND1, YARELIS, TROPT, TROIQ, ARIELA, TROPT, TNIPOC, BNP, BNPP   Coagulation Recent Labs     01/16/23  1320 01/15/23  2137 01/15/23  1012 01/15/23  0611   PTP 17.2*  --   --  12.6   INR 1.4*  --   --  0.9   APTT 32.2 43.0*   < > 26.3    < > = values in this interval not displayed.        Lipid Panel Lab Results   Component Value Date/Time    Cholesterol, total 140 03/01/2014 04:28 AM    HDL Cholesterol 27 (L) 03/01/2014 04:28 AM    LDL, calculated 74.2 03/01/2014 04:28 AM    VLDL, calculated 38.8 03/01/2014 04:28 AM    Triglyceride 194 (H) 03/01/2014 04:28 AM    CHOL/HDL Ratio 5.2 (H) 03/01/2014 04:28 AM      BNP No results found for: BNP, BNPP, XBNPT   Liver Enzymes Recent Labs     01/17/23  0630   TP 6.4   ALB 3.7   AP 53      Thyroid Studies No results found for: T4, T3U, TSH, TSHEXT       Signed By: Claude Blunt PA-C     January 17, 2023

## 2023-01-17 NOTE — PROGRESS NOTES
Problem: Mobility Impaired (Adult and Pediatric)  Goal: *Acute Goals and Plan of Care (Insert Text)  Description: Physical Therapy Goals  Initiated 1/17/2023 and to be accomplished within 7 day(s)  1. Patient will move from supine to sit and sit to supine  in bed with modified independence. 2.  Patient will transfer from bed to chair and chair to bed with modified independence using the least restrictive device. 3.  Patient will perform sit to stand with modified independence. 4.  Patient will ambulate with modified independence for 300 feet with the least restrictive device. PLOF: Pt lives with his wife in a [facility] with ramp entry. Indep PTA. Outcome: Progressing Towards Goal     PHYSICAL THERAPY EVALUATION    Patient: Rishi Ferreira (57 y.o. male)  Date: 1/17/2023  Primary Diagnosis: Acute ST elevation myocardial infarction (STEMI) involving other coronary artery of inferior wall (HCC) [I21.19]  STEMI (ST elevation myocardial infarction) (HCC) [I21.3]  Procedure(s) (LRB):  CORONARY ARTERY BYPASS GRAFT (CABG) TIMES THREE (N/A)  ESOPHAGEAL TRANS ECHOCARDIOGRAM (N/A) 1 Day Post-Op   Precautions:  Fall, Skin, Sternal    ASSESSMENT :  Patient is 61 yo male admitted to hospital for CABGx3 and presents today POD 1 and agreeable to therapy and was sitting up in recliner upon arrival. Pt seen with both PT and OT to maximize patients safety, mobility, and participation. Patient was educated on role of therapy and sternal precautions and demonstrated good compliance throughout session. Objective assessment performed and patient scooted towards edge of recliner with CGA and stood with Harris. Once standing patient used Rollator to ambulate 75 ft slowly in hallway with x2 standing rest breaks. Patient required cues for relaxing and deep breaths despite pain. At conclusion of gait training patient transferred to sitting up in recliner with CGA and was left resting with call bell by their side.  Patient educated not to get up without assist and oriented to call bell; patient demonstrated understanding. Patient also educated on deep breathing techniques. Patient demonstrates decreased strength, mobility, and endurance. Patient will benefit from skilled intervention to address the above impairments. Patient's rehabilitation potential is considered to be Good  Factors which may influence rehabilitation potential include:   []         None noted  []         Mental ability/status  []         Medical condition  [x]         Home/family situation and support systems  [x]         Safety awareness  [x]         Pain tolerance/management  []         Other:      PLAN :  Recommendations and Planned Interventions:   [x]           Bed Mobility Training             [x]    Neuromuscular Re-Education  [x]           Transfer Training                   []    Orthotic/Prosthetic Training  [x]           Gait Training                          []    Modalities  [x]           Therapeutic Exercises           []    Edema Management/Control  [x]           Therapeutic Activities            [x]    Family Training/Education  [x]           Patient Education  []           Other (comment):    Frequency/Duration: Patient will be followed by physical therapy 1-2 times per day/4-7 days per week to address goals. Further Equipment Recommendations for Discharge: rolling walker    AMPAC: Current research shows that an AM-PAC score of 18 (14 without stairs) or greater is associated with a discharge to the patient's home setting. Based on an AM-PAC score of 18/24 (or **/20 if omitting stairs) and their current functional mobility deficits, it is recommended that the patient have 2-3 sessions per week of Physical Therapy at d/c to increase the patient's independence. This AMPAC score should be considered in conjunction with interdisciplinary team recommendations to determine the most appropriate discharge setting.  Patient's social support, diagnosis, medical stability, and prior level of function should also be taken into consideration. SUBJECTIVE:   Patient stated [place] Farm you guys so much, come back again.     OBJECTIVE DATA SUMMARY:     Past Medical History:   Diagnosis Date    Anginal pain ([facility]     Diabetes ([facility]     new onset 2/2014    High cholesterol     Hypertension    History reviewed. No pertinent surgical history. Barriers to Learning/Limitations: None  Compensate with: N/A  Home Situation:  Home Situation  Home Environment: Private residence  Wheelchair Ramp: Yes  One/Two Story Residence: One story  Living Alone: No  Support Systems: Spouse/Significant Other  Patient Expects to be Discharged to[de-identified] Home with home health  Current DME Used/Available at Home: None  Critical Behavior:  Neurologic State: Alert  Orientation Level: Oriented X4  Cognition: Follows commands  Safety/Judgement: Fall prevention  Psychosocial  Patient Behaviors: Calm; Cooperative  Purposeful Interaction: Yes  Pt Identified Daily Priority: Clinical issues (comment)  Caritas Process: Establish trust;Teaching/learning; Attend basic human needs  Caring Interventions: Therapeutic modalities; Reassure  Reassure: Informing;Caring rounds  Therapeutic Modalities: Intentional therapeutic touch  Skin Condition/Temp: Warm     Skin Integrity: Incision (comment)  Skin Integumentary  Skin Color: Appropriate for ethnicity  Skin Condition/Temp: Warm  Skin Integrity: Incision (comment)  Hair Growth: Present  Nails: Within Defined Limits     Strength:    Strength: Generally decreased, functional       Tone & Sensation:   Tone: Normal          Sensation: Intact        Range Of Motion:  AROM: Generally decreased, functional        Transfers:  Sit to Stand: Minimum assistance  Stand to Sit: Contact guard assistance             Balance:   Sitting: Intact; With support  Standing: Impaired; With support  Standing - Static: Good  Standing - Dynamic : Fair    Ambulation/Gait Training:  Distance (ft): 75 Feet (ft)  Assistive Device: Walker, rollator  Ambulation - Level of Assistance: Contact guard assistance        Gait Abnormalities: Decreased step clearance; Antalgic              Speed/Keri: Pace decreased (<100 feet/min)    Pain:  Pain level pre-treatment: 6/10   Pain level post-treatment: 6/10   Pain Intervention(s) : Medication (see MAR); Rest, Ice, Repositioning  Response to intervention: Nurse notified, See doc flow    Activity Tolerance:   Pt tolerated mobility well  Please refer to the flowsheet for vital signs taken during this treatment. After treatment:   [x]         Patient left in no apparent distress sitting up in chair  []         Patient left in no apparent distress in bed  [x]         Call bell left within reach  [x]         Nursing notified  []         Caregiver present  []         Bed alarm activated  []         SCDs applied    COMMUNICATION/EDUCATION:   [x]         Role of Physical Therapy in the acute care setting. [x]         Fall prevention education was provided and the patient/caregiver indicated understanding. [x]         Patient/family have participated as able in goal setting and plan of care. []         Patient/family agree to work toward stated goals and plan of care. []         Patient understands intent and goals of therapy, but is neutral about his/her participation. []         Patient is unable to participate in goal setting/plan of care: ongoing with therapy staff.  []         Other:     Thank you for this referral.  Chace Ayala   Time Calculation: 28 mins      Eval Complexity: History: MEDIUM  Complexity : 1-2 comorbidities / personal factors will impact the outcome/ POC Exam:MEDIUM Complexity : 3 Standardized tests and measures addressing body structure, function, activity limitation and / or participation in recreation  Presentation: LOW Complexity : Stable, uncomplicated  Clinical Decision Making:Low Complexity    Overall Complexity:MEDIUM    Memo Shanks AM-PAC® Basic Mobility Inpatient Short Form (6-Clicks) Version 2    How much HELP from another person does the patient currently need    (If the patient hasn't done an activity recently, how much help from another person do you think he/she would need if he/she tried?)   Total (Total A or Dep)   A Lot  (Mod to Max A)   A Little (Sup or Min A)   None (Mod I to I)   Turning from your back to your side while in a flat bed without using bedrails? [] 1 [] 2 [x] 3 [] 4   2. Moving from lying on your back to sitting on the side of a flat bed without using bedrails? [] 1 [] 2 [x] 3 [] 4   3. Moving to and from a bed to a chair (including a wheelchair)? [] 1 [] 2 [x] 3 [] 4   4. Standing up from a chair using your arms (e.g., wheelchair, or bedside chair)? [] 1 [] 2 [x] 3 [] 4   5. Walking in hospital room? [] 1 [] 2 [x] 3 [] 4   6. Climbing 3-5 steps with a railing?+   [] 1 [] 2 [x] 3 [] 4   +If stair climbing cannot be assessed, skip item #6. Sum responses from items 1-5. Current research shows that an AM-PAC score of 18 (14 without stairs) or greater is associated with a discharge to the patient's home setting. Based on an AM-PAC score of 18/24 (or **/20 if omitting stairs) and their current functional mobility deficits, it is recommended that the patient have 2-3 sessions per week of Physical Therapy at d/c to increase the patient's independence.

## 2023-01-17 NOTE — PROGRESS NOTES
0700-received shift report from off going nurse David Godoy RN. Pt in recliner resting quietly with no complaints at this time. 1050-PT/OT at bedside. Pt ambulated in the hallway ~75 feet. Pt tolerated well. 1330-pt ambulated in the hallway ~75 feet. Pt tolerated well. 1355-Notified Dr. Kumar Ceja pt urine output has been 15 ml/hr for the last 2 hours. Verbal order given to administer albumin 5% 500 ml.     1530-Dr. Kumar Ceja at bedside. Informed Dr. Kumar Ceja pt urine output has not improved. Verbal order given for stat BMP and KUB. 1610-Dr. Kumar Ceja at bedside to review KUB. Orders to follow. 1730-Pt ambulated in the hallway ~75 feet. Pt tolerated well. Pt placed back to bed.

## 2023-01-17 NOTE — CONSULTS
Comprehensive Nutrition Assessment    Type and Reason for Visit: Initial, Consult    Nutrition Recommendations/Plan:   Continue current diet as tolerated. Continue folic acid, thiamine, multivitamin supplementation daily. Monitor PO intake, weight, labs and plan of care during admission. Malnutrition Assessment:  Malnutrition Status: At risk for malnutrition (specify) (poor PO PTA) (01/17/23 1207)    Context:  Acute illness       Nutrition History and Allergies: PMHx: type 2 diabetes, hypertension, hyperlipidemia, chronic smoker, alcohol abuse. Wt hx: 195 lb (1/15/23) --> 180 lb (1/17/23). Pt reports weighing 193 lb at the doctor office x 3 months ago, stable per pt. Wt loss of 6.7% x 3 months per pt report. Reports a decrease in appetite x 1 month PTA due to \"food doesn't taste good\". NKFA. Nutrition Assessment:    Admitted for chest pain, s/p CABG x 3 1/16. Consult noted for s/p CABG. Pt OOBTC, reports tolerating PO this AM, consumed >70% of breakfast. Declining any oral supplements at this time. Nutrition Education:  Educated on cardiac diet  Learners: Patient  Readiness: Acceptance  Method: Explanation and Handout  Response: Verbalizes Understanding  Contact name and number provided. Nutrition Related Findings:    Pertinent Meds: lipitor, dulcolax, cyanocobalamin, colace, folic acid, humalog, venofer, 2g Mg sulf, protonix, miralax, theragran, thiamine, insulin drip  Pertinent Labs: POC Glucose 114-129 mg/dl x 24 hrs, Lactic acid 2.5 H (trending down) Wound Type: Surgical incision    Current Nutrition Intake & Therapies:  Average Meal Intake: 51-75%     ADULT DIET Regular; 4 carb choices (60 gm/meal); Low Fat/Low Chol/High Fiber/2 gm Na; no concentrated sweets. no red meat. ADULT ORAL NUTRITION SUPPLEMENT Breakfast, Lunch, Dinner;  Other Supplement; beer 12 oz    Anthropometric Measures:  Height: 6' 1\" (185.4 cm)  Ideal Body Weight (IBW): 184 lbs (84 kg)  Admission Body Weight: 180 lb 5.4 oz (81.8 kg)  Current Body Wt:  81.8 kg (180 lb 5.4 oz), 98 % IBW.     Current BMI (kg/m2): 23.8    Estimated Daily Nutrient Needs:  Energy Requirements Based On: Formula  Weight Used for Energy Requirements: Admission  Energy (kcal/day): 3563-5403 (MSJ 1-1.1)  Weight Used for Protein Requirements: Current  Protein (g/day): 82-98 (1-1.2 g/day)  Method Used for Fluid Requirements: 1 ml/kcal  Fluid (ml/day): 5497-9425    Nutrition Diagnosis:   Inadequate oral intake related to acute injury/trauma, early satiety as evidenced by poor intake prior to admission    Nutrition Interventions:   Food and/or Nutrient Delivery: Continue current diet  Nutrition Education/Counseling: No recommendations at this time  Coordination of Nutrition Care: Continue to monitor while inpatient  Plan of Care discussed with: pt    Goals:     Goals: Meet at least 75% of estimated needs, by next RD assessment       Nutrition Monitoring and Evaluation:   Behavioral-Environmental Outcomes: None identified  Food/Nutrient Intake Outcomes: Food and nutrient intake  Physical Signs/Symptoms Outcomes: Biochemical data, Chewing or swallowing, GI status, Weight, Skin, Meal time behavior, Hemodynamic status    Discharge Planning:    Continue current diet    Kati Santana 87, [address]   Contact: [phone]

## 2023-01-17 NOTE — PROGRESS NOTES
Problem: Self Care Deficits Care Plan (Adult)  Goal: *Acute Goals and Plan of Care (Insert Text)  Description: Occupational Therapy Goals  Initiated 1/17/2023 within 7 day(s). 1.  Patient will perform grooming at sink in stance with G balance x 2-3 min with supervision/set-up. 2.  Patient will perform upper body dressing with modified independence. 3.  Patient will perform lower body dressing with supervision/set-up. 4.  Patient will perform toilet transfers with supervision/set-up. 5.  Patient will perform all aspects of toileting with supervision/set-up. 6.  Patient will utilize energy conservation techniques and sternal precautions during functional activities with verbal cues. Prior Level of Function:Pt reports independence at home      Outcome: Progressing Towards Goal   OCCUPATIONAL THERAPY EVALUATION    Patient: Boyd Felipe (1 y.o. male)  Date: 1/17/2023  Primary Diagnosis: Acute ST elevation myocardial infarction (STEMI) involving other coronary artery of inferior wall (HCC) [I21.19]  STEMI (ST elevation myocardial infarction) ([facility] [address] [I21.3]  Procedure(s) (LRB):  CORONARY ARTERY BYPASS GRAFT (CABG) TIMES THREE (N/A)  ESOPHAGEAL TRANS ECHOCARDIOGRAM (N/A) 1 Day Post-Op   Precautions:   Fall, Skin, Sternal  PLOF: see above    ASSESSMENT :  Based on the objective data described below, the patient presents with generally decreased functional activity tolerance, impaired functional mobility, generally decreased UB strength with new sternal precautions s/p CABG x 3 impacting independence in self care. Pt cotreated with PT to maximize pt safety and participation. Pt seated in recliner chair at beginning of session and agreeable to OT session. Pt educated on steranl precautions and proper use of heart hugger. Pt sat up with CGA and donned gown with min A. Pt performed sit>stand with min A and rocking strategy, Once in stance, pt returned to sitting min A to slow descent to free IV line.  Pt stood again with min A and rocking after short seated rest break. Pt performed short ambulation in hallway with rollator and CGA with slow progression, v/c for relaxing tension in shoulders and 1 standing rest break to simulated bathroom mobility. Pt with O2 sats between 84% to 93% during ambulation. Pt returned to recliner chair with min A and v/c for hand placement. Pt able to scoot back with SBA. Pt with all needs in reach at end of session. Patient will benefit from skilled intervention to address the above impairments. Patient's rehabilitation potential is considered to be Good  Factors which may influence rehabilitation potential include:   []             None noted  []             Mental ability/status  [x]             Medical condition  []             Home/family situation and support systems  []             Safety awareness  []             Pain tolerance/management  []             Other:      PLAN :  Recommendations and Planned Interventions:   [x]               Self Care Training                  [x]      Therapeutic Activities  [x]               Functional Mobility Training   []      Cognitive Retraining  [x]               Therapeutic Exercises           [x]      Endurance Activities  [x]               Balance Training                    [x]      Neuromuscular Re-Education  []               Visual/Perceptual Training     [x]      Home Safety Training  [x]               Patient Education                   [x]      Family Training/Education  []               Other (comment):    Frequency/Duration: Patient will be followed by occupational therapy 1-2 times per day/4-7 days per week to address goals. Further Equipment Recommendations for Discharge: rolling walker and elevated toilet seat. [facility]: Current research shows that an AM-PAC score of 18 or greater is associated with a discharge to the patient's home setting.   Based on an AM-PAC score of 19/24 and their current ADL deficits; it is recommended that the patient have 2-3 sessions per week of Occupational Therapy at d/c to increase the patient's independence. This [facility] score should be considered in conjunction with interdisciplinary team recommendations to determine the most appropriate discharge setting. Patient's social support, diagnosis, medical stability, and prior level of function should also be taken into consideration. SUBJECTIVE:   Patient stated It just hurts to breathe deep.     OBJECTIVE DATA SUMMARY:     Past Medical History:   Diagnosis Date    Anginal pain ([facility] [address]     Diabetes ([facility] [address]     new onset 2/2014    High cholesterol     Hypertension    History reviewed. No pertinent surgical history. Barriers to Learning/Limitations: None  Compensate with: visual, verbal, tactile, kinesthetic cues/model    Home Situation:   Home Situation  Home Environment: Private residence  Wheelchair Ramp: Yes  One/Two Story Residence: One story  Living Alone: No  Support Systems: Spouse/Significant Other  Patient Expects to be Discharged to[de-identified] Home with home health  Current DME Used/Available at Home: None  Tub or Shower Type: Tub/Shower combination  [x]  Right hand dominant   []  Left hand dominant    Cognitive/Behavioral Status:  Neurologic State: Alert  Orientation Level: Oriented X4  Cognition: Follows commands  Safety/Judgement: Fall prevention    Skin: intact  Edema: none noted     Vision/Perceptual:       intact       Coordination: BUE  Coordination: Within functional limits  Fine Motor Skills-Upper: Left Intact; Right Intact    Gross Motor Skills-Upper: Left Intact; Right Intact  Balance:  Sitting: Intact; With support  Standing: Impaired; With support  Standing - Static: Good  Standing - Dynamic : Fair  Strength: BUE    Strength: Generally decreased, functional   Tone & Sensation: BUE    Tone: Normal  Sensation: Intact   Range of Motion: BUE    AROM: Generally decreased, functional   Functional Mobility and Transfers for ADLs:  Transfers:  Sit to Stand: Minimum assistance  Stand to Sit: Contact guard assistance     ADL Assessment:   Feeding: Modified independent (based on clinical judgement)    Oral Facial Hygiene/Grooming: Setup (based on clinical judgement)    Upper Body Dressing: Minimum assistance    Lower Body Dressing: Minimum assistance (based on clinical judgement)    Toileting: Minimum assistance (based on clinical judgement)     ADL Intervention:     Upper Body [address] Gown: Minimum  assistance  Cognitive Retraining  Safety/Judgement: Fall prevention    Pain:  Pain level pre-treatment: 1/10   Pain level post-treatment: 6/10 with deep breathing  Pain Intervention(s): Medication (see MAR); Rest, Ice, Repositioning   Response to intervention: Nurse notified, See doc flow    Activity Tolerance:   fair  Please refer to the flowsheet for vital signs taken during this treatment. After treatment:   [x] Patient left in no apparent distress sitting up in chair  [] Patient left in no apparent distress in bed  [x] Call bell left within reach  [x] Nursing notified  [] Caregiver present  [] Bed alarm activated    COMMUNICATION/EDUCATION:   [x] Role of Occupational Therapy in the acute care setting  [x] Home safety education was provided and the patient/caregiver indicated understanding. [x] Patient/family have participated as able in goal setting and plan of care. [] Patient/family agree to work toward stated goals and plan of care. [] Patient understands intent and goals of therapy, but is neutral about his/her participation. [] Patient is unable to participate in goal setting and plan of care. Thank you for this referral.  Jero Alaniz OT  Time Calculation: 28 mins    Eval Complexity: History: MEDIUM Complexity : Expanded review of history including physical, cognitive and psychosocial  history ;    Examination: MEDIUM Complexity : 3-5 performance deficits relating to physical, cognitive , or psychosocial skils that result in activity limitations and / or participation restrictions; Decision Making:MEDIUM Complexity : Patient may present with comorbidities that affect occupational performnce. Miniml to moderate modification of tasks or assistance (eg, physical or verbal ) with assesment(s) is necessary to enable patient to complete evaluation     [facility] [employer] AM-PAC® Daily Activity Inpatient Short Form (6-Clicks)*    How much HELP from another person does the patient currently need    (If the patient hasn't done an activity recently, how much help from another person do you think he/she would need if he/she tried?)   Total (Total A or Dep)   A Lot  (Mod to Max A)   A Little (Sup or Min A)   None (Mod I to I)   Putting on and taking off regular lower body clothing? [] 1 [] 2 [x] 3 [] 4   2. Bathing (including washing, rinsing,      drying)? [] 1 [x] 2 [] 3 [] 4   3. Toileting, which includes using toilet, bedpan or urinal?   [] 1 [] 2 [x] 3 [] 4   4. Putting on and taking off regular upper body clothing? [] 1 [] 2 [x] 3 [] 4   5. Taking care of personal grooming such as brushing teeth? [] 1 [] 2 [] 3 [x] 4   6. Eating meals?    [] 1 [] 2 [] 3 [x] 4

## 2023-01-17 NOTE — ANESTHESIA POSTPROCEDURE EVALUATION
Procedure(s):  CORONARY ARTERY BYPASS GRAFT (CABG) TIMES THREE  ESOPHAGEAL TRANS ECHOCARDIOGRAM.    general    Anesthesia Post Evaluation      Multimodal analgesia: multimodal analgesia used between 6 hours prior to anesthesia start to PACU discharge  Patient location during evaluation: ICU  Patient participation: complete - patient participated  Level of consciousness: awake  Pain score: 5  Airway patency: patent  Anesthetic complications: no  Cardiovascular status: acceptable  Respiratory status: acceptable  Hydration status: acceptable  Post anesthesia nausea and vomiting:  none  Final Post Anesthesia Temperature Assessment:  Normothermia (36.0-37.5 degrees C)      INITIAL Post-op Vital signs:   Vitals Value Taken Time   /98 01/17/23 0900   Temp 36.4 °C (97.6 °F) 01/17/23 0800   Pulse 83 01/17/23 0914   Resp 23 01/17/23 0914   SpO2 94 % 01/17/23 0914   Vitals shown include unvalidated device data.

## 2023-01-17 NOTE — PROGRESS NOTES
CARDIAC SURGERY PROGRESS NOTE    2023  7:05 AM     CC:  Post Operative Day # 1     Chart, images and labs reviewed. Discussed with available staff. Interval History/Events of Past 24 hours:   OOB in chair. No vasoactive drips. 4 l/m nc. PAF coverted with amio. Subjective:  Patient seen and examined on rounds today. No complaint  Pain level: controlled    Objective:  Vital signs:   Visit Vitals  /79   Pulse 87   Temp 99.5 °F (37.5 °C)   Resp 21   Wt 81.8 kg (180 lb 6.4 oz)   SpO2 93%   BMI 23.80 kg/m²     Temp (24hrs), Av °F (37.2 °C), Min:96.4 °F (35.8 °C), Max:99.9 °F (37.7 °C)    Admission Weight: Last Weight   Weight: 81.8 kg (180 lb 6.4 oz) Weight: 81.8 kg (180 lb 6.4 oz)     Telemetry: SR 85    Physical Examination:     General:  Alert, oriented   Lungs: decreased at bases, otherwise, clear to auscultation without rales, wheezes or rhonchi. Chest: Dressings clean and dry. Heart: regular rate and rhythm, No murmur. Abdomen: Soft and non-tender without masses. Bowel sounds not present. Extremities: Warm and well perfused. Edema absent. Incisions: clean, dry,  soft left ACE  Neuro: No deficit. Chest tubes:  Present. Air Leak:  No          Labs:  Lab Results   Component Value Date/Time    WBC 6.3 2023 06:30 AM    HCT 27.9 (L) 2023 06:30 AM     2023 06:30 AM      Lab Results   Component Value Date/Time     2023 01:20 PM    K 3.8 2023 01:20 PM     2023 01:20 PM    CO2 26 2023 01:20 PM     (H) 2023 01:20 PM    BUN 5 (L) 2023 01:20 PM    CREA 0.81 2023 01:20 PM    CREA 0.67 2023 05:16 AM    CREA 0.72 01/15/2023 06:11 AM     CXR: no ptx or effusion     Assessment:  S/P  CABG x 3  Respiratory parameters stable  Cardiac status stable     Plans:  1.  IS and ambulation  2. Amio to PO  3.  BB, ,statin, ASA and plavix as ordered  4.   Beer and watch for alcohol withdrawals     Heart Hugger use is encouraged to mitigate pain and will also assist with deep breathing and incentive spirometry goals. Possible discharge 4 days. Abby Meyers PA-C    PLEASE NOTE:  This document has been produced using voice recognition software. Unrecognized errors in transcription may be present. NOTE TO PATIENT:  The purpose of this note is to communicate optimally with the other providers involved in your care. It is written using standard medical terminology. If you have questions regarding details of the note please call my office at [phone] and make an appointment to discuss your concerns.

## 2023-01-18 ENCOUNTER — APPOINTMENT (OUTPATIENT)
Dept: GENERAL RADIOLOGY | Age: 63
DRG: 235 | End: 2023-01-18
Attending: THORACIC SURGERY (CARDIOTHORACIC VASCULAR SURGERY)
Payer: OTHER GOVERNMENT

## 2023-01-18 ENCOUNTER — APPOINTMENT (OUTPATIENT)
Dept: GENERAL RADIOLOGY | Age: 63
DRG: 235 | End: 2023-01-18
Attending: PHYSICIAN ASSISTANT
Payer: OTHER GOVERNMENT

## 2023-01-18 LAB
ANION GAP SERPL CALC-SCNC: 8 MMOL/L (ref 3–18)
ATRIAL RATE: 95 BPM
BASOPHILS # BLD: 0 K/UL (ref 0–0.1)
BASOPHILS NFR BLD: 0 % (ref 0–2)
BUN SERPL-MCNC: 7 MG/DL (ref 7–18)
BUN/CREAT SERPL: 11 (ref 12–20)
CALCIUM SERPL-MCNC: 9.1 MG/DL (ref 8.5–10.1)
CALCULATED P AXIS, ECG09: -5 DEGREES
CALCULATED R AXIS, ECG10: -23 DEGREES
CALCULATED T AXIS, ECG11: -3 DEGREES
CHLORIDE SERPL-SCNC: 102 MMOL/L (ref 100–111)
CO2 SERPL-SCNC: 22 MMOL/L (ref 21–32)
CREAT SERPL-MCNC: 0.65 MG/DL (ref 0.6–1.3)
DIAGNOSIS, 93000: NORMAL
DIFFERENTIAL METHOD BLD: ABNORMAL
EOSINOPHIL # BLD: 0 K/UL (ref 0–0.4)
EOSINOPHIL NFR BLD: 1 % (ref 0–5)
ERYTHROCYTE [DISTWIDTH] IN BLOOD BY AUTOMATED COUNT: 12.8 % (ref 11.6–14.5)
GLUCOSE BLD STRIP.AUTO-MCNC: 101 MG/DL (ref 70–110)
GLUCOSE BLD STRIP.AUTO-MCNC: 116 MG/DL (ref 70–110)
GLUCOSE BLD STRIP.AUTO-MCNC: 120 MG/DL (ref 70–110)
GLUCOSE BLD STRIP.AUTO-MCNC: 135 MG/DL (ref 70–110)
GLUCOSE SERPL-MCNC: 127 MG/DL (ref 74–99)
HCT VFR BLD AUTO: 26.3 % (ref 36–48)
HGB BLD-MCNC: 9.6 G/DL (ref 13–16)
HIV 1+2 AB+HIV1 P24 AG SERPL QL IA: NONREACTIVE
HIV12 RESULT COMMENT, HHIVC: NORMAL
IMM GRANULOCYTES # BLD AUTO: 0 K/UL (ref 0–0.04)
IMM GRANULOCYTES NFR BLD AUTO: 1 % (ref 0–0.5)
LYMPHOCYTES # BLD: 1.1 K/UL (ref 0.9–3.6)
LYMPHOCYTES NFR BLD: 14 % (ref 21–52)
MCH RBC QN AUTO: 33.2 PG (ref 24–34)
MCHC RBC AUTO-ENTMCNC: 36.5 G/DL (ref 31–37)
MCV RBC AUTO: 91 FL (ref 78–100)
MONOCYTES # BLD: 0.7 K/UL (ref 0.05–1.2)
MONOCYTES NFR BLD: 9 % (ref 3–10)
NEUTS SEG # BLD: 6.2 K/UL (ref 1.8–8)
NEUTS SEG NFR BLD: 76 % (ref 40–73)
NRBC # BLD: 0 K/UL (ref 0–0.01)
NRBC BLD-RTO: 0 PER 100 WBC
P-R INTERVAL, ECG05: 142 MS
PLATELET # BLD AUTO: 140 K/UL (ref 135–420)
PMV BLD AUTO: 9.1 FL (ref 9.2–11.8)
POTASSIUM SERPL-SCNC: 3.7 MMOL/L (ref 3.5–5.5)
Q-T INTERVAL, ECG07: 348 MS
QRS DURATION, ECG06: 86 MS
QTC CALCULATION (BEZET), ECG08: 437 MS
RBC # BLD AUTO: 2.89 M/UL (ref 4.35–5.65)
SODIUM SERPL-SCNC: 132 MMOL/L (ref 136–145)
VENTRICULAR RATE, ECG03: 95 BPM
WBC # BLD AUTO: 8.2 K/UL (ref 4.6–13.2)

## 2023-01-18 PROCEDURE — 74011250636 HC RX REV CODE- 250/636: Performed by: THORACIC SURGERY (CARDIOTHORACIC VASCULAR SURGERY)

## 2023-01-18 PROCEDURE — 97535 SELF CARE MNGMENT TRAINING: CPT

## 2023-01-18 PROCEDURE — 99024 POSTOP FOLLOW-UP VISIT: CPT | Performed by: PHYSICIAN ASSISTANT

## 2023-01-18 PROCEDURE — 74018 RADEX ABDOMEN 1 VIEW: CPT

## 2023-01-18 PROCEDURE — 74011250636 HC RX REV CODE- 250/636: Performed by: PHYSICIAN ASSISTANT

## 2023-01-18 PROCEDURE — 74011000250 HC RX REV CODE- 250: Performed by: PHYSICIAN ASSISTANT

## 2023-01-18 PROCEDURE — 99232 SBSQ HOSP IP/OBS MODERATE 35: CPT | Performed by: INTERNAL MEDICINE

## 2023-01-18 PROCEDURE — 71045 X-RAY EXAM CHEST 1 VIEW: CPT

## 2023-01-18 PROCEDURE — 77010033678 HC OXYGEN DAILY

## 2023-01-18 PROCEDURE — 80048 BASIC METABOLIC PNL TOTAL CA: CPT

## 2023-01-18 PROCEDURE — 94762 N-INVAS EAR/PLS OXIMTRY CONT: CPT

## 2023-01-18 PROCEDURE — 65620000000 HC RM CCU GENERAL

## 2023-01-18 PROCEDURE — 85025 COMPLETE CBC W/AUTO DIFF WBC: CPT

## 2023-01-18 PROCEDURE — 82962 GLUCOSE BLOOD TEST: CPT

## 2023-01-18 PROCEDURE — 97116 GAIT TRAINING THERAPY: CPT

## 2023-01-18 PROCEDURE — 74011250637 HC RX REV CODE- 250/637: Performed by: PHYSICIAN ASSISTANT

## 2023-01-18 PROCEDURE — 97530 THERAPEUTIC ACTIVITIES: CPT

## 2023-01-18 PROCEDURE — 74011250637 HC RX REV CODE- 250/637: Performed by: THORACIC SURGERY (CARDIOTHORACIC VASCULAR SURGERY)

## 2023-01-18 PROCEDURE — 93005 ELECTROCARDIOGRAM TRACING: CPT

## 2023-01-18 RX ORDER — PANTOPRAZOLE SODIUM 40 MG/1
40 TABLET, DELAYED RELEASE ORAL DAILY
Status: DISCONTINUED | OUTPATIENT
Start: 2023-01-18 | End: 2023-01-21 | Stop reason: HOSPADM

## 2023-01-18 RX ORDER — METOPROLOL TARTRATE 25 MG/1
25 TABLET, FILM COATED ORAL 2 TIMES DAILY
Status: DISCONTINUED | OUTPATIENT
Start: 2023-01-18 | End: 2023-01-21 | Stop reason: HOSPADM

## 2023-01-18 RX ORDER — POTASSIUM CHLORIDE 20 MEQ/1
40 TABLET, EXTENDED RELEASE ORAL 2 TIMES DAILY
Status: COMPLETED | OUTPATIENT
Start: 2023-01-18 | End: 2023-01-18

## 2023-01-18 RX ORDER — FUROSEMIDE 10 MG/ML
20 INJECTION INTRAMUSCULAR; INTRAVENOUS ONCE
Status: COMPLETED | OUTPATIENT
Start: 2023-01-18 | End: 2023-01-18

## 2023-01-18 RX ORDER — METOCLOPRAMIDE HYDROCHLORIDE 5 MG/ML
5 INJECTION INTRAMUSCULAR; INTRAVENOUS EVERY 8 HOURS
Status: COMPLETED | OUTPATIENT
Start: 2023-01-18 | End: 2023-01-19

## 2023-01-18 RX ADMIN — FOLIC ACID 1 MG: 1 TABLET ORAL at 09:05

## 2023-01-18 RX ADMIN — MORPHINE SULFATE 2 MG: 2 INJECTION, SOLUTION INTRAMUSCULAR; INTRAVENOUS at 14:33

## 2023-01-18 RX ADMIN — METOCLOPRAMIDE 5 MG: 5 INJECTION, SOLUTION INTRAMUSCULAR; INTRAVENOUS at 17:05

## 2023-01-18 RX ADMIN — MUPIROCIN: 20 OINTMENT TOPICAL at 17:06

## 2023-01-18 RX ADMIN — IRON SUCROSE 200 MG: 20 INJECTION, SOLUTION INTRAVENOUS at 09:04

## 2023-01-18 RX ADMIN — CHLORHEXIDINE GLUCONATE 0.12% ORAL RINSE 10 ML: 1.2 LIQUID ORAL at 17:06

## 2023-01-18 RX ADMIN — MORPHINE SULFATE 4 MG: 2 INJECTION, SOLUTION INTRAMUSCULAR; INTRAVENOUS at 23:50

## 2023-01-18 RX ADMIN — HEPARIN SODIUM 5000 UNITS: 5000 INJECTION INTRAVENOUS; SUBCUTANEOUS at 17:06

## 2023-01-18 RX ADMIN — FUROSEMIDE 20 MG: 10 INJECTION, SOLUTION INTRAMUSCULAR; INTRAVENOUS at 09:07

## 2023-01-18 RX ADMIN — PANTOPRAZOLE SODIUM 40 MG: 40 TABLET, DELAYED RELEASE ORAL at 09:06

## 2023-01-18 RX ADMIN — AMIODARONE HYDROCHLORIDE 200 MG: 200 TABLET ORAL at 17:05

## 2023-01-18 RX ADMIN — TAMSULOSIN HYDROCHLORIDE 0.4 MG: 0.4 CAPSULE ORAL at 09:05

## 2023-01-18 RX ADMIN — POTASSIUM CHLORIDE 40 MEQ: 1500 TABLET, EXTENDED RELEASE ORAL at 09:06

## 2023-01-18 RX ADMIN — HYDROCODONE BITARTRATE AND ACETAMINOPHEN 2 TABLET: 5; 325 TABLET ORAL at 20:00

## 2023-01-18 RX ADMIN — Medication 100 MG: at 09:05

## 2023-01-18 RX ADMIN — HYDROCODONE BITARTRATE AND ACETAMINOPHEN 2 TABLET: 5; 325 TABLET ORAL at 05:54

## 2023-01-18 RX ADMIN — AMIODARONE HYDROCHLORIDE 200 MG: 200 TABLET ORAL at 22:59

## 2023-01-18 RX ADMIN — POLYETHYLENE GLYCOL 3350 17 G: 17 POWDER, FOR SOLUTION ORAL at 09:05

## 2023-01-18 RX ADMIN — MORPHINE SULFATE 2 MG: 2 INJECTION, SOLUTION INTRAMUSCULAR; INTRAVENOUS at 09:04

## 2023-01-18 RX ADMIN — CEFAZOLIN 2 G: 1 INJECTION, POWDER, FOR SOLUTION INTRAMUSCULAR; INTRAVENOUS at 05:54

## 2023-01-18 RX ADMIN — SODIUM CHLORIDE, PRESERVATIVE FREE 10 ML: 5 INJECTION INTRAVENOUS at 14:33

## 2023-01-18 RX ADMIN — DOCUSATE SODIUM 100 MG: 100 CAPSULE, LIQUID FILLED ORAL at 17:05

## 2023-01-18 RX ADMIN — BISACODYL 10 MG: 5 TABLET, COATED ORAL at 09:05

## 2023-01-18 RX ADMIN — MUPIROCIN: 20 OINTMENT TOPICAL at 09:04

## 2023-01-18 RX ADMIN — HYDROCODONE BITARTRATE AND ACETAMINOPHEN 2 TABLET: 5; 325 TABLET ORAL at 11:37

## 2023-01-18 RX ADMIN — CHLORHEXIDINE GLUCONATE 0.12% ORAL RINSE 10 ML: 1.2 LIQUID ORAL at 09:04

## 2023-01-18 RX ADMIN — MORPHINE SULFATE 4 MG: 2 INJECTION, SOLUTION INTRAMUSCULAR; INTRAVENOUS at 01:46

## 2023-01-18 RX ADMIN — THERA TABS 1 TABLET: TAB at 09:06

## 2023-01-18 RX ADMIN — SODIUM CHLORIDE, PRESERVATIVE FREE 10 ML: 5 INJECTION INTRAVENOUS at 22:00

## 2023-01-18 RX ADMIN — ATORVASTATIN CALCIUM 80 MG: 40 TABLET, FILM COATED ORAL at 09:05

## 2023-01-18 RX ADMIN — CLOPIDOGREL BISULFATE 75 MG: 75 TABLET ORAL at 09:06

## 2023-01-18 RX ADMIN — METOCLOPRAMIDE 10 MG: 5 INJECTION, SOLUTION INTRAMUSCULAR; INTRAVENOUS at 05:54

## 2023-01-18 RX ADMIN — POTASSIUM CHLORIDE 40 MEQ: 1500 TABLET, EXTENDED RELEASE ORAL at 17:04

## 2023-01-18 RX ADMIN — CYANOCOBALAMIN TAB 1000 MCG 1000 MCG: 1000 TAB at 09:05

## 2023-01-18 RX ADMIN — METOCLOPRAMIDE 5 MG: 5 INJECTION, SOLUTION INTRAMUSCULAR; INTRAVENOUS at 22:59

## 2023-01-18 RX ADMIN — METOPROLOL TARTRATE 25 MG: 25 TABLET, FILM COATED ORAL at 09:06

## 2023-01-18 RX ADMIN — AMIODARONE HYDROCHLORIDE 200 MG: 200 TABLET ORAL at 09:20

## 2023-01-18 RX ADMIN — HEPARIN SODIUM 5000 UNITS: 5000 INJECTION INTRAVENOUS; SUBCUTANEOUS at 09:07

## 2023-01-18 RX ADMIN — HEPARIN SODIUM 5000 UNITS: 5000 INJECTION INTRAVENOUS; SUBCUTANEOUS at 22:59

## 2023-01-18 RX ADMIN — SODIUM CHLORIDE, PRESERVATIVE FREE 10 ML: 5 INJECTION INTRAVENOUS at 09:05

## 2023-01-18 RX ADMIN — ASPIRIN 81 MG: 81 TABLET, COATED ORAL at 09:05

## 2023-01-18 RX ADMIN — DOCUSATE SODIUM 100 MG: 100 CAPSULE, LIQUID FILLED ORAL at 09:07

## 2023-01-18 RX ADMIN — METOPROLOL TARTRATE 25 MG: 25 TABLET, FILM COATED ORAL at 17:05

## 2023-01-18 NOTE — PROGRESS NOTES
0700-received shift report from off going [address]. Pt in recliner resting quietly with no complaints at this time. 0800-pt ambulated in the hallway ~300 feet. Pt tolerated well. 1000-PT at bedside. Pt ambulated in the hallway. 1130-decreased pt oxygen level to 2L nasal canula. Pt oxygen saturation 96%. 1445-pt ambulated in the hallway ~350 feet. Pt tolerated well. 1540-Dr. Chester Valdez at pt bedside. Verbal order for Reglan 5 mg every 8 hours for 3 doses. 1820-pt ambulated in the hallway ~350 feet. Pt tolerated well. Pt assisted back to bed.

## 2023-01-18 NOTE — PROGRESS NOTES
Reason for Admission:  Acute ST elevation myocardial infarction (STEMI) involving other coronary artery of inferior wall (HCC) [I21.19]  STEMI (ST elevation myocardial infarction) ([facility] [address] [I21.3]                 RUR Score:    12            Plan for utilizing home health:    yes                      Likelihood of Readmission:   LOW                         Transition of Care Plan:              Initial assessment completed with patient. Cognitive status of patient: oriented to time, place, person and situation. Face sheet information confirmed:  yes. The patient designates his wife Brain Rafita to participate in his discharge plan and to receive any needed information. This patient lives in a single family home with wife. Patient is able to navigate steps as needed. Prior to hospitalization, patient was considered to be independent with ADLs/IADLS : yes . Patient has a current ACP document on file: no      Healthcare Decision Maker:     Click here to complete [address] including selection of the Healthcare Decision Maker Relationship (ie \"Primary\")    The wife will be available to transport patient home upon discharge. The patient already has [employer] [address] [employer] chair medical equipment available in the home. Patient is not currently active with home health. Patient has not stayed in a skilled nursing facility or rehab. Was  stay within last 60 days : no. This patient is on dialysis :no   Currently, the discharge plan is Home with Home Health. The patient states that he can obtain his medications from the pharmacy, and take his medications as directed. Patient's current insurance is [employer]       Care Management Interventions  PCP Verified by CM: Yes  Palliative Care Criteria Met (RRAT>21 & CHF Dx)?: No  Mode of Transport at Discharge:  Other (see comment) (family)  Transition of Care Consult (CM Consult): Discharge Planning  Physical Therapy Consult: Yes  Occupational Therapy Consult: Yes  Support Systems: Spouse/Significant Other, Child(miguel)  Confirm Follow Up Transport: Family  Discharge Location  Patient Expects to be Discharged to[de-identified] Home with home health        BANDAR Deleon RN  Care Management  Pager: [phone]

## 2023-01-18 NOTE — PROGRESS NOTES
0120:  Patient called RN to room for bedpan. Patient able to pass gas at this time, no BM.      0500:  Patient bathed with CHG wipes, dressings cleansed and changed, gown and linen changed. Patient up in chair.

## 2023-01-18 NOTE — PROGRESS NOTES
Pt's clinicals faxed to the [address] [place] [address] office [phone].             SOFY PillaiN RN  Care Management  Pager: [phone]

## 2023-01-18 NOTE — PROGRESS NOTES
CARDIAC SURGERY PROGRESS NOTE    2023  7:28 AM     CC:  Post Operative Day # 2     Chart, images and labs reviewed. Discussed with available staff. Rounded with Dr. Aaron Stock Interval History/Events of Past 24 hours:   ST, no AF. No vasoactive drips. Adequate UO and nl creat. +flatus but no BM. Subjective:  Patient seen and examined on rounds today. No complaints    Objective:  Vital signs:   Visit Vitals  /86   Pulse 98   Temp 98.4 °F (36.9 °C)   Resp 21   Ht 6' 1\" (1.854 m)   Wt 84.2 kg (185 lb 11.2 oz)   SpO2 97%   BMI 24.50 kg/m²     Temp (24hrs), Av.4 °F (36.9 °C), Min:97.6 °F (36.4 °C), Max:98.8 °F (37.1 °C)    Admission Weight: Last Weight   Weight: 81.8 kg (180 lb 6.4 oz) Weight: 84.2 kg (185 lb 11.2 oz)     Telemetry:     Physical Examination:     General:  Alert, oriented   Lungs: decreased at bases without rales, wheezes or rhonchi. Chest: Dressings clean and dry. Midline incision healing well. Sternum stable. Heart: regular rate and rhythm, No murmur. Abdomen: Soft and non-tender without masses. Bowel sounds present. Extremities: Warm and well perfused. Edema mild. Incisions: clean, dry, no drainage. Neuro: No deficit. Labs:  Lab Results   Component Value Date/Time    WBC 8.2 2023 05:10 AM    HCT 26.3 (L) 2023 05:10 AM     2023 05:10 AM      Lab Results   Component Value Date/Time     (L) 2023 05:10 AM    K 3.7 2023 05:10 AM     2023 05:10 AM    CO2 22 2023 05:10 AM     (H) 2023 05:10 AM    BUN 7 2023 05:10 AM    CREA 0.65 2023 05:10 AM    CREA 0.58 (L) 2023 03:43 PM    CREA 0.74 2023 06:30 AM       CXR: no ptx or signif effusion, pulm vasc nael     Assessment:  S/P  CABG x 4  Respiratory parameters stable  Cardiac status stable     Plans:  1. Lasix and K, remove Kim tomorrow.   2.  Increase BB and cont amio, plavix, asa and increase statin  3.  IS and ambulation  4. Tubes in until tapered   5. Fe+    Heart Hugger use is encouraged to mitigate pain and will also assist with deep breathing and incentive spirometry goals. Abdoul Jade PA-C    PLEASE NOTE:  This document has been produced using voice recognition software. Unrecognized errors in transcription may be present. NOTE TO PATIENT:  The purpose of this note is to communicate optimally with the other providers involved in your care. It is written using standard medical terminology. If you have questions regarding details of the note please call my office at [phone] and make an appointment to discuss your concerns.

## 2023-01-18 NOTE — PROGRESS NOTES
Problem: Mobility Impaired (Adult and Pediatric)  Goal: *Acute Goals and Plan of Care (Insert Text)  Description: Physical Therapy Goals  Initiated 1/17/2023 and to be accomplished within 7 day(s)  1. Patient will move from supine to sit and sit to supine  in bed with modified independence. 2.  Patient will transfer from bed to chair and chair to bed with modified independence using the least restrictive device. 3.  Patient will perform sit to stand with modified independence. 4.  Patient will ambulate with modified independence for 300 feet with the least restrictive device. PLOF: Pt lives with his wife in a [facility] with ramp entry. Indep PTA. Outcome: Progressing Towards Goal     PHYSICAL THERAPY TREATMENT    Patient: Peter Puentes (35 y.o. male)  Date: 1/18/2023  Diagnosis: Acute ST elevation myocardial infarction (STEMI) involving other coronary artery of inferior wall (HCC) [I21.19]  STEMI (ST elevation myocardial infarction) ([facility] [address] [I21.3] <principal problem not specified>  Procedure(s) (LRB):  CORONARY ARTERY BYPASS GRAFT (CABG) TIMES THREE (N/A)  ESOPHAGEAL TRANS ECHOCARDIOGRAM (N/A) 2 Days Post-Op  Precautions: Fall, Skin, Sternal    ASSESSMENT:  RN cleared for mobility. Pt found sitting up in recliner, in NAD, willing to work with PT. He reports feeling better today and already amb once in the hallway. Pt able to recall sternal precautions well during session. STS with Harris. He then amb 300 ft in hallway with rollator and steady gait. Pt returned back sitting up in recliner, vitals WFL. He was left sitting up with CASIE KELLY's elevated, call bell nearby, all needs met. RN notified.    Progression toward goals:   []      Improving appropriately and progressing toward goals  [x]      Improving slowly and progressing toward goals  []      Not making progress toward goals and plan of care will be adjusted     PLAN:  Patient continues to benefit from skilled intervention to address the above impairments. Continue treatment per established plan of care. Further Equipment Recommendations for Discharge:  rolling walker    AMPAC: At this time and based on an AM-PAC score of 18/24 (or **/20 if omitting stairs), no further PT is recommended upon discharge due to (i.e. patient at baseline functional statusetc). Recommend patient returns to prior setting with prior services. This AMPAC score should be considered in conjunction with interdisciplinary team recommendations to determine the most appropriate discharge setting. Patient's social support, diagnosis, medical stability, and prior level of function should also be taken into consideration. SUBJECTIVE:   Patient stated I have a good teacher.     OBJECTIVE DATA SUMMARY:   Critical Behavior:  Neurologic State: Alert  Orientation Level: Oriented X4  Cognition: Follows commands  Safety/Judgement: Fall prevention  Functional Mobility Training:        Transfers:  Sit to Stand: Minimum assistance  Stand to Sit: Contact guard assistance     Balance:  Sitting: Intact; With support  Standing: Impaired; With support  Standing - Static: Good  Standing - Dynamic : Fair       Ambulation/Gait Training:  Distance (ft): 300 Feet (ft)  Assistive Device: Walker, rollator  Ambulation - Level of Assistance: Stand-by assistance     Speed/Keri: Pace decreased (<100 feet/min)      Pain:  Pain level pre-treatment: 4/10  Pain level post-treatment: 4/10   Pain Intervention(s): Medication (see MAR); Rest, Repositioning   Response to intervention: Nurse notified, See doc flow    Activity Tolerance:   Pt tolerated mobility well  Please refer to the flowsheet for vital signs taken during this treatment.   After treatment:   [x] Patient left in no apparent distress sitting up in chair  [] Patient left in no apparent distress in bed  [] Call bell left within reach  [x] Nursing notified  [x] Caregiver present  [] Bed alarm activated  [] SCDs applied      COMMUNICATION/EDUCATION:   [x]         Role of Physical Therapy in the acute care setting. [x]         Fall prevention education was provided and the patient/caregiver indicated understanding. [x]         Patient/family have participated as able in working toward goals and plan of care. []         Patient/family agree to work toward stated goals and plan of care. []         Patient understands intent and goals of therapy, but is neutral about his/her participation. []         Patient is unable to participate in stated goals/plan of care: ongoing with therapy staff.  []         Other:        Doc Tan   Time Calculation: 15 mins    [facility] AM-PAC® Basic Mobility Inpatient Short Form (6-Clicks) Version 2    How much HELP from another person does the patient currently need    (If the patient hasn't done an activity recently, how much help from another person do you think he/she would need if he/she tried?)   Total (Total A or Dep)   A Lot  (Mod to Max A)   A Little (Sup or Min A)   None (Mod I to I)   Turning from your back to your side while in a flat bed without using bedrails? [] 1 [] 2 [x] 3 [] 4   2. Moving from lying on your back to sitting on the side of a flat bed without using bedrails? [] 1 [] 2 [x] 3 [] 4   3. Moving to and from a bed to a chair (including a wheelchair)? [] 1 [] 2 [x] 3 [] 4   4. Standing up from a chair using your arms (e.g., wheelchair, or bedside chair)? [] 1 [] 2 [x] 3 [] 4   5. Walking in hospital room? [] 1 [] 2 [x] 3 [] 4   6. Climbing 3-5 steps with a railing?+   [] 1 [] 2 [x] 3 [] 4   +If stair climbing cannot be assessed, skip item #6. Sum responses from items 1-5. At this time and based on an AM-PAC score of 18/24 (or **/20 if omitting stairs), no further PT is recommended upon discharge due to (i.e. patient at baseline functional statusetc). Recommend patient returns to prior setting with prior services.

## 2023-01-18 NOTE — PROGRESS NOTES
Cardiology Progress Note    Admit Date: 1/15/2023  Attending Cardiologist: Dr. Bruna Silver     Assessment:     -CAD, s/p CABGx3 1/16/23, LIMA-LAD, SVG-Ramus intermedius, SVG-posterior descending branch of RCA. -Inferior STEMI 1/15/23 [facility]). Presented with CP, underwent emergent LHC with RCA balloon angioplasty. LHC findings as noted below. Transferred to [facility] for CABG.   100% occluded proximal/mid very calcified large RCA. Baseline CAROLINE 0 flow post angioplasty CAROLINE-3 flow. Due to severe calcification stent is unable to cross the lesion. Moderate-sized ramus intermedius have a 80% calcified ostial/proximal disease. Large sized mid/distal LAD have 80% disease. Large left main artery have ostial 40% to 50% disease in cranial views with nonischemic IFR of 0.96. LVEDP 16 mmHg. -Transient atrial fibrillation: Postoperatively on day 1. -HTN   -DM2   -Active tobacco abuse. -Alcohol abuse. Primary cardiologist is Dr. Anselmo Rizvi:       I saw, evaluated, interviewed and examined the patient personally. Any symptoms concerning for angina or heart failure  Decreased breath sound but no obvious significant rales. No edema. Abdomen is soft. No obvious murmur  Not on any pressors  Now on amiodarone, aspirin, Plavix, metoprolol        Significant time spent in reviewing the case, multiple EMR databases, physician notes, reviewing pertinent labs and imaging studies  I spent significant amount of time for medical decision making and updated history, and other providers assessments as well. I personally agree with the findings as stated and the plan as documented. I saw, examined, and evaluated this patient and performed the substantive portion of the encounter for > 50% of the time including extensive history, physical exam, assessment and plan    Bruna Silver MD       POD #2, Continue routine post operative care per CTS team.   Increase activity as tolerated. Encourage ICS.    Rhythm stable overnight, remains in SR. Continue BB, amiodarone. Continue ASA, plavix, statin. Subjective:     Doing well, no specific complaints. Looking forward to having his chest tubes removed.      Objective:      Patient Vitals for the past 8 hrs:   Temp Pulse Resp BP SpO2   01/18/23 1705 -- 97 -- (!) 146/87 --   01/18/23 1600 99.1 °F (37.3 °C) 93 20 125/79 97 %   01/18/23 1500 -- 96 24 110/84 (!) 82 %   01/18/23 1400 -- 90 19 107/76 99 %   01/18/23 1300 -- 89 23 99/71 (!) 87 %   01/18/23 1200 98.7 °F (37.1 °C) 89 25 106/74 98 %   01/18/23 1100 -- 88 26 (!) 127/95 93 %   01/18/23 1000 -- 98 24 (!) 153/93 98 %           Patient Vitals for the past 96 hrs:   Weight   01/18/23 0519 84.2 kg (185 lb 11.2 oz)   01/17/23 0530 81.8 kg (180 lb 6.4 oz)         TELE: SR               Current Facility-Administered Medications   Medication Dose Route Frequency Last Admin    metoprolol tartrate (LOPRESSOR) tablet 25 mg  25 mg Oral BID 25 mg at 01/18/23 1705    pantoprazole (PROTONIX) tablet 40 mg  40 mg Oral DAILY 40 mg at 01/18/23 0906    metoclopramide HCl (REGLAN) injection 5 mg  5 mg IntraVENous Q8H 5 mg at 01/18/23 1705    insulin lispro (HUMALOG) injection   SubCUTAneous AC&HS      glucose chewable tablet 16 g  4 Tablet Oral PRN      glucagon (GLUCAGEN) injection 1 mg  1 mg IntraMUSCular PRN      cyanocobalamin tablet 1,000 mcg  1,000 mcg Oral DAILY 1,000 mcg at 01/18/23 0905    iron sucrose (VENOFER) injection 200 mg  200 mg IntraVENous Q24H 200 mg at 01/18/23 0904    clopidogreL (PLAVIX) tablet 75 mg  75 mg Oral DAILY 75 mg at 01/18/23 0906    HYDROcodone-acetaminophen (NORCO) 5-325 mg per tablet 1-2 Tablet  1-2 Tablet Oral Q6H PRN 2 Tablet at 01/18/23 1137    bisacodyL (DULCOLAX) suppository 10 mg  10 mg Rectal DAILY 10 mg at 01/17/23 1657    tamsulosin (FLOMAX) capsule 0.4 mg  0.4 mg Oral DAILY 0.4 mg at 01/18/23 0905    albumin human 5% (BUMINATE) solution 12.5 g  12.5 g IntraVENous Q20MIN PRN 12.5 g at 01/16/23 1177    sodium chloride (NS) flush 5-40 mL  5-40 mL IntraVENous Q8H 10 mL at 01/18/23 1433    sodium chloride (NS) flush 5-40 mL  5-40 mL IntraVENous PRN      acetaminophen (TYLENOL) tablet 650 mg  650 mg Oral Q4H PRN      morphine injection 2-4 mg  2-4 mg IntraVENous Q4H PRN 2 mg at 01/18/23 1433    naloxone (NARCAN) injection 0.4 mg  0.4 mg IntraVENous PRN      mupirocin (BACTROBAN) 2 % ointment   Both Nostrils BID Given at 01/18/23 1706    amiodarone (CORDARONE) tablet 200 mg  200 mg Oral  mg at 01/18/23 1705    albuterol (PROVENTIL VENTOLIN) nebulizer solution 2.5 mg  2.5 mg Nebulization Q4H PRN      ondansetron (ZOFRAN) injection 4 mg  4 mg IntraVENous Q4H PRN      aspirin delayed-release tablet 81 mg  81 mg Oral DAILY 81 mg at 01/18/23 0905    chlorhexidine (PERIDEX) 0.12 % mouthwash 10 mL  10 mL Oral BID 10 mL at 01/18/23 1706    docusate sodium (COLACE) capsule 100 mg  100 mg Oral  mg at 01/18/23 1705    ELECTROLYTE REPLACEMENT PROTOCOL - Potassium Standard Dosing  1 Each Other PRN      ELECTROLYTE REPLACEMENT PROTOCOL - Magnesium  1 Each Other PRN      heparin (porcine) injection 5,000 Units  5,000 Units SubCUTAneous Q8H 5,000 Units at 01/18/23 1706    dextrose 10% infusion 0-250 mL  0-250 mL IntraVENous PRN      bisacodyL (DULCOLAX) tablet 10 mg  10 mg Oral DAILY 10 mg at 01/18/23 0905    polyethylene glycol (MIRALAX) packet 17 g  17 g Oral DAILY 17 g at 01/18/23 0905    atorvastatin (LIPITOR) tablet 80 mg  80 mg Oral DAILY 80 mg at 54/28/33 4267    folic acid (FOLVITE) tablet 1 mg  1 mg Oral DAILY 1 mg at 01/18/23 0905    thiamine HCL (B-1) tablet 100 mg  100 mg Oral DAILY 100 mg at 01/18/23 0905    therapeutic multivitamin [facility]) tablet 1 Tablet  1 Tablet Oral DAILY 1 Tablet at 01/18/23 0906         Intake/Output Summary (Last 24 hours) at 1/18/2023 1741  Last data filed at 1/18/2023 1647  Gross per 24 hour   Intake 1260 ml   Output 2600 ml   Net -1340 ml         Physical Exam:  General:  alert, cooperative, no distress, appears stated age  Neck:  no JVD  Lungs:  diminished B/L   Heart:  RRR  Abdomen:  abdomen is soft without significant tenderness, masses, organomegaly or guarding  Extremities:  extremities normal, atraumatic, no cyanosis or edema    Visit Vitals  BP (!) 146/87   Pulse 97   Temp 99.1 °F (37.3 °C)   Resp 20   Ht 6' 1\" (1.854 m)   Wt 84.2 kg (185 lb 11.2 oz)   SpO2 97%   BMI 24.50 kg/m²       Data Review:     Labs: Results:       Chemistry Recent Labs     01/18/23  0510 01/17/23  1543 01/17/23  0630 01/16/23  1320 01/16/23  0516   * 113* 129* 104* 119*   * 134* 136 137 129*   K 3.7 3.8 3.8 3.8 3.5    104 107 102 96*   CO2 22 25 27 26 27   BUN 7 8 6* 5* 5*   CREA 0.65 0.58* 0.74 0.81 0.67   CA 9.1 8.6 8.6 8.8 9.7   MG  --   --  1.9 2.6  --    AGAP 8 5 2* 9 6   BUCR 11* 14 8* 6* 7*   AP  --   --  53 61 87   TP  --   --  6.4 5.5* 7.7   ALB  --   --  3.7 3.3* 3.7   GLOB  --   --  2.7 2.2 4.0   AGRAT  --   --  1.4 1.5 0.9        CBC w/Diff Recent Labs     01/18/23  0510 01/17/23  0630 01/16/23  1320   WBC 8.2 6.3 10.1   RBC 2.89* 3.08* 3.38*   HGB 9.6* 10.1* 11.2*   HCT 26.3* 27.9* 29.7*    142 132*   GRANS 76* 75* 77*   LYMPH 14* 14* 15*   EOS 1 0 1        Cardiac Enzymes No results found for: CPK, CK, CKMMB, CKMB, RCK3, CKMBT, CKNDX, CKND1, YARELIS, TROPT, TROIQ, ARIELA, TROPT, TNIPOC, BNP, BNPP   Coagulation Recent Labs     01/16/23  1320 01/15/23  2137   PTP 17.2*  --    INR 1.4*  --    APTT 32.2 43.0*         Lipid Panel Lab Results   Component Value Date/Time    Cholesterol, total 140 03/01/2014 04:28 AM    HDL Cholesterol 27 (L) 03/01/2014 04:28 AM    LDL, calculated 74.2 03/01/2014 04:28 AM    VLDL, calculated 38.8 03/01/2014 04:28 AM    Triglyceride 194 (H) 03/01/2014 04:28 AM    CHOL/HDL Ratio 5.2 (H) 03/01/2014 04:28 AM      BNP No results found for: BNP, BNPP, XBNPT   Liver Enzymes Recent Labs     01/17/23  0630   TP 6.4   ALB 3.7   AP 53        Thyroid Studies No results found for: T4, T3U, TSH, TSHEXT, TSHEXT       Signed By: Hailey Robertson PA-C     January 18, 2023

## 2023-01-18 NOTE — PROGRESS NOTES
Problem: Self Care Deficits Care Plan (Adult)  Goal: *Acute Goals and Plan of Care (Insert Text)  Description: Occupational Therapy Goals  Initiated 1/17/2023 within 7 day(s). 1.  Patient will perform grooming at sink in stance with G balance x 2-3 min with supervision/set-up. 2.  Patient will perform upper body dressing with modified independence. 3.  Patient will perform lower body dressing with supervision/set-up. 4.  Patient will perform toilet transfers with supervision/set-up. 5.  Patient will perform all aspects of toileting with supervision/set-up. 6.  Patient will utilize energy conservation techniques and sternal precautions during functional activities with verbal cues. Prior Level of Function:Pt reports independence at home      Outcome: Progressing Towards Goal   OCCUPATIONAL THERAPY TREATMENT    Patient: Sue Velasco (89 y.o. male)  Date: 1/18/2023  Diagnosis: Acute ST elevation myocardial infarction (STEMI) involving other coronary artery of inferior wall (HCC) [I21.19]  STEMI (ST elevation myocardial infarction) ([facility] [address] [I21.3] <principal problem not specified>  Procedure(s) (LRB):  CORONARY ARTERY BYPASS GRAFT (CABG) TIMES THREE (N/A)  ESOPHAGEAL TRANS ECHOCARDIOGRAM (N/A) 2 Days Post-Op  Precautions: Fall, Skin, Sternal  PLOF: Pt reports independence at home     Chart, occupational therapy assessment, plan of care, and goals were reviewed. ASSESSMENT:  Pt cleared by RN for OT tx at this time. Pt presented sitting on toilet upon entry and agreeable for participation. Reviewed sternal precautions, energy conservation techniques w/ADLs, and safety w/ AD for functional transfers/mobility. STS transfer for toilet MIN A with Rollator. He required MAX A for manish area hygiene 2/2 decreased dyn standing balance. Once finished, he returned back to reclining chair ~ 10 ft SBA with Rollator. Pt required MOD A doffing/donning his socks at chair level.  Pt may benefit from AE training next session to increase (I) during all LB ADL tasks. He was left with B LE's elevated and all needs left within reach. RN made aware. Progression toward goals:  []          Improving appropriately and progressing toward goals  []          Improving slowly and progressing toward goals  []          Not making progress toward goals and plan of care will be adjusted     PLAN:  Patient continues to benefit from skilled intervention to address the above impairments. Continue treatment per established plan of care. Further Equipment Recommendations for Discharge:  [facility]: Current research shows that an AM-PAC score of 18 or greater is associated with a discharge to the patient's home setting. Based on an AM-PAC score of 18/24 and their current ADL deficits; it is recommended that the patient have 2-3 sessions per week of Occupational Therapy at d/c to increase the patient's independence. This AMPA score should be considered in conjunction with interdisciplinary team recommendations to determine the most appropriate discharge setting. Patient's social support, diagnosis, medical stability, and prior level of function should also be taken into consideration. SUBJECTIVE:   Patient stated I just have gas now.     OBJECTIVE DATA SUMMARY:   Cognitive/Behavioral Status:  Neurologic State: Alert  Orientation Level: Oriented X4  Cognition: Follows commands  Safety/Judgement: Fall prevention    Functional Mobility and Transfers for ADLs:      Transfers:  Sit to Stand: Minimum assistance  Stand to Sit: Contact guard assistance      Balance:  Sitting: Intact; With support  Standing: Impaired; With support  Standing - Static: Good  Standing - Dynamic : Fair    ADL Intervention:     Lower Body Bathing  Perineal  : Maximum assistance  Position Performed: Standing  Adaptive Equipment: Wipes(personal cleansing cloth); Walker         Lower Body Dressing Assistance  Socks:  Moderate assistance  Leg Crossed Method Used: Yes  Position Performed: Seated in chair  Cues: Verbal cues provided;Visual cues provided         Cognitive Retraining  Safety/Judgement: Fall prevention      Pain:  Pain level pre-treatment: 0/10   Pain level post-treatment: 0/10      Activity Tolerance:    Fair   Please refer to the flowsheet for vital signs taken during this treatment. After treatment:   [x]  Patient left in no apparent distress sitting up in chair  []  Patient left in no apparent distress in bed  [x]  Call bell left within reach  [x]  Nursing notified  []  Caregiver present  []  Bed alarm activated    COMMUNICATION/EDUCATION:   [x] Role of Occupational Therapy in the acute care setting  [x] Home safety education was provided and the patient/caregiver indicated understanding. [x] Patient/family have participated as able in working towards goals and plan of care. [x] Patient/family agree to work toward stated goals and plan of care. [] Patient understands intent and goals of therapy, but is neutral about his/her participation. [] Patient is unable to participate in goal setting and plan of care. Thank you for this referral.  ELIJAH Knight  Time Calculation: 23 mins    [address] [facility] [address] [address] AM-PAC® Daily Activity Inpatient Short Form (6-Clicks)    How much HELP from another person does the patient currently need    (If the patient hasn't done an activity recently, how much help from another person do you think he/she would need if he/she tried?)   Total (Total A or Dep)   A Lot  (Mod to Max A)   A Little (Sup or Min A)   None (Mod I to I)   Putting on and taking off regular lower body clothing? [] 1 [x] 2 [] 3 [] 4   2. Bathing (including washing, rinsing,      drying)? [] 1 [] 2 [x] 3 [] 4   3. Toileting, which includes using toilet, bedpan or urinal?   [] 1 [x] 2 [] 3 [] 4   4. Putting on and taking off regular upper body clothing? [] 1 [] 2 [x] 3 [] 4   5. Taking care of personal grooming such as brushing teeth?    [] 1 [] 2 [] 3 [x] 4   6. Eating meals?    [] 1 [] 2 [] 3 [x] 4

## 2023-01-19 ENCOUNTER — APPOINTMENT (OUTPATIENT)
Dept: GENERAL RADIOLOGY | Age: 63
DRG: 235 | End: 2023-01-19
Attending: PHYSICIAN ASSISTANT
Payer: OTHER GOVERNMENT

## 2023-01-19 LAB
ABO + RH BLD: NORMAL
ANION GAP SERPL CALC-SCNC: 6 MMOL/L (ref 3–18)
BASOPHILS # BLD: 0 K/UL (ref 0–0.1)
BASOPHILS NFR BLD: 0 % (ref 0–2)
BLD PROD TYP BPU: NORMAL
BLD PROD TYP BPU: NORMAL
BLOOD GROUP ANTIBODIES SERPL: NORMAL
BPU ID: NORMAL
BPU ID: NORMAL
BUN SERPL-MCNC: 9 MG/DL (ref 7–18)
BUN/CREAT SERPL: 17 (ref 12–20)
CALCIUM SERPL-MCNC: 9.1 MG/DL (ref 8.5–10.1)
CHLORIDE SERPL-SCNC: 101 MMOL/L (ref 100–111)
CO2 SERPL-SCNC: 23 MMOL/L (ref 21–32)
CREAT SERPL-MCNC: 0.54 MG/DL (ref 0.6–1.3)
CROSSMATCH RESULT,%XM: NORMAL
CROSSMATCH RESULT,%XM: NORMAL
DIFFERENTIAL METHOD BLD: ABNORMAL
EOSINOPHIL # BLD: 0.1 K/UL (ref 0–0.4)
EOSINOPHIL NFR BLD: 1 % (ref 0–5)
ERYTHROCYTE [DISTWIDTH] IN BLOOD BY AUTOMATED COUNT: 12.6 % (ref 11.6–14.5)
GLUCOSE BLD STRIP.AUTO-MCNC: 126 MG/DL (ref 70–110)
GLUCOSE BLD STRIP.AUTO-MCNC: 133 MG/DL (ref 70–110)
GLUCOSE BLD STRIP.AUTO-MCNC: 88 MG/DL (ref 70–110)
GLUCOSE BLD STRIP.AUTO-MCNC: 91 MG/DL (ref 70–110)
GLUCOSE SERPL-MCNC: 88 MG/DL (ref 74–99)
HCT VFR BLD AUTO: 24 % (ref 36–48)
HGB BLD-MCNC: 8.6 G/DL (ref 13–16)
IMM GRANULOCYTES # BLD AUTO: 0 K/UL (ref 0–0.04)
IMM GRANULOCYTES NFR BLD AUTO: 0 % (ref 0–0.5)
LYMPHOCYTES # BLD: 1.4 K/UL (ref 0.9–3.6)
LYMPHOCYTES NFR BLD: 20 % (ref 21–52)
MCH RBC QN AUTO: 32.7 PG (ref 24–34)
MCHC RBC AUTO-ENTMCNC: 35.8 G/DL (ref 31–37)
MCV RBC AUTO: 91.3 FL (ref 78–100)
MONOCYTES # BLD: 0.7 K/UL (ref 0.05–1.2)
MONOCYTES NFR BLD: 10 % (ref 3–10)
NEUTS SEG # BLD: 4.8 K/UL (ref 1.8–8)
NEUTS SEG NFR BLD: 68 % (ref 40–73)
NRBC # BLD: 0 K/UL (ref 0–0.01)
NRBC BLD-RTO: 0 PER 100 WBC
PLATELET # BLD AUTO: 144 K/UL (ref 135–420)
PMV BLD AUTO: 9.2 FL (ref 9.2–11.8)
POTASSIUM SERPL-SCNC: 3.9 MMOL/L (ref 3.5–5.5)
RBC # BLD AUTO: 2.63 M/UL (ref 4.35–5.65)
SODIUM SERPL-SCNC: 130 MMOL/L (ref 136–145)
SPECIMEN EXP DATE BLD: NORMAL
STATUS OF UNIT,%ST: NORMAL
STATUS OF UNIT,%ST: NORMAL
UNIT DIVISION, %UDIV: 0
UNIT DIVISION, %UDIV: 0
WBC # BLD AUTO: 7 K/UL (ref 4.6–13.2)

## 2023-01-19 PROCEDURE — 97530 THERAPEUTIC ACTIVITIES: CPT

## 2023-01-19 PROCEDURE — 85025 COMPLETE CBC W/AUTO DIFF WBC: CPT

## 2023-01-19 PROCEDURE — 65620000000 HC RM CCU GENERAL

## 2023-01-19 PROCEDURE — APPNB180 APP NON BILLABLE TIME > 60 MINS: Performed by: PHYSICIAN ASSISTANT

## 2023-01-19 PROCEDURE — 82962 GLUCOSE BLOOD TEST: CPT

## 2023-01-19 PROCEDURE — 99024 POSTOP FOLLOW-UP VISIT: CPT | Performed by: PHYSICIAN ASSISTANT

## 2023-01-19 PROCEDURE — 74011250636 HC RX REV CODE- 250/636: Performed by: THORACIC SURGERY (CARDIOTHORACIC VASCULAR SURGERY)

## 2023-01-19 PROCEDURE — 2709999900 HC NON-CHARGEABLE SUPPLY

## 2023-01-19 PROCEDURE — 74011250636 HC RX REV CODE- 250/636: Performed by: PHYSICIAN ASSISTANT

## 2023-01-19 PROCEDURE — 97116 GAIT TRAINING THERAPY: CPT

## 2023-01-19 PROCEDURE — 80048 BASIC METABOLIC PNL TOTAL CA: CPT

## 2023-01-19 PROCEDURE — 71045 X-RAY EXAM CHEST 1 VIEW: CPT

## 2023-01-19 PROCEDURE — 74011250637 HC RX REV CODE- 250/637: Performed by: THORACIC SURGERY (CARDIOTHORACIC VASCULAR SURGERY)

## 2023-01-19 PROCEDURE — 74011000250 HC RX REV CODE- 250: Performed by: PHYSICIAN ASSISTANT

## 2023-01-19 PROCEDURE — 94762 N-INVAS EAR/PLS OXIMTRY CONT: CPT

## 2023-01-19 PROCEDURE — 74011250637 HC RX REV CODE- 250/637: Performed by: PHYSICIAN ASSISTANT

## 2023-01-19 PROCEDURE — 99232 SBSQ HOSP IP/OBS MODERATE 35: CPT | Performed by: INTERNAL MEDICINE

## 2023-01-19 PROCEDURE — 77010033678 HC OXYGEN DAILY

## 2023-01-19 PROCEDURE — 97535 SELF CARE MNGMENT TRAINING: CPT

## 2023-01-19 RX ORDER — FUROSEMIDE 10 MG/ML
20 INJECTION INTRAMUSCULAR; INTRAVENOUS EVERY 8 HOURS
Status: COMPLETED | OUTPATIENT
Start: 2023-01-19 | End: 2023-01-19

## 2023-01-19 RX ORDER — POTASSIUM CHLORIDE 20 MEQ/1
40 TABLET, EXTENDED RELEASE ORAL 2 TIMES DAILY
Status: DISCONTINUED | OUTPATIENT
Start: 2023-01-19 | End: 2023-01-20

## 2023-01-19 RX ADMIN — ATORVASTATIN CALCIUM 80 MG: 40 TABLET, FILM COATED ORAL at 08:16

## 2023-01-19 RX ADMIN — MUPIROCIN: 20 OINTMENT TOPICAL at 17:03

## 2023-01-19 RX ADMIN — HYDROCODONE BITARTRATE AND ACETAMINOPHEN 2 TABLET: 5; 325 TABLET ORAL at 01:10

## 2023-01-19 RX ADMIN — AMIODARONE HYDROCHLORIDE 200 MG: 200 TABLET ORAL at 08:15

## 2023-01-19 RX ADMIN — DOCUSATE SODIUM 100 MG: 100 CAPSULE, LIQUID FILLED ORAL at 17:01

## 2023-01-19 RX ADMIN — CHLORHEXIDINE GLUCONATE 0.12% ORAL RINSE 10 ML: 1.2 LIQUID ORAL at 08:17

## 2023-01-19 RX ADMIN — METOPROLOL TARTRATE 25 MG: 25 TABLET, FILM COATED ORAL at 17:01

## 2023-01-19 RX ADMIN — POTASSIUM CHLORIDE 40 MEQ: 1500 TABLET, EXTENDED RELEASE ORAL at 17:01

## 2023-01-19 RX ADMIN — FUROSEMIDE 20 MG: 10 INJECTION, SOLUTION INTRAMUSCULAR; INTRAVENOUS at 08:17

## 2023-01-19 RX ADMIN — Medication 100 MG: at 08:15

## 2023-01-19 RX ADMIN — HEPARIN SODIUM 5000 UNITS: 5000 INJECTION INTRAVENOUS; SUBCUTANEOUS at 08:17

## 2023-01-19 RX ADMIN — AMIODARONE HYDROCHLORIDE 200 MG: 200 TABLET ORAL at 16:55

## 2023-01-19 RX ADMIN — METOCLOPRAMIDE 5 MG: 5 INJECTION, SOLUTION INTRAMUSCULAR; INTRAVENOUS at 06:00

## 2023-01-19 RX ADMIN — CLOPIDOGREL BISULFATE 75 MG: 75 TABLET ORAL at 08:17

## 2023-01-19 RX ADMIN — HYDROCODONE BITARTRATE AND ACETAMINOPHEN 2 TABLET: 5; 325 TABLET ORAL at 09:44

## 2023-01-19 RX ADMIN — AMIODARONE HYDROCHLORIDE 200 MG: 200 TABLET ORAL at 22:01

## 2023-01-19 RX ADMIN — ASPIRIN 81 MG: 81 TABLET, COATED ORAL at 08:15

## 2023-01-19 RX ADMIN — POTASSIUM CHLORIDE 40 MEQ: 1500 TABLET, EXTENDED RELEASE ORAL at 08:15

## 2023-01-19 RX ADMIN — MUPIROCIN: 20 OINTMENT TOPICAL at 08:18

## 2023-01-19 RX ADMIN — PANTOPRAZOLE SODIUM 40 MG: 40 TABLET, DELAYED RELEASE ORAL at 08:16

## 2023-01-19 RX ADMIN — CHLORHEXIDINE GLUCONATE 0.12% ORAL RINSE 10 ML: 1.2 LIQUID ORAL at 17:02

## 2023-01-19 RX ADMIN — DOCUSATE SODIUM 100 MG: 100 CAPSULE, LIQUID FILLED ORAL at 08:16

## 2023-01-19 RX ADMIN — IRON SUCROSE 200 MG: 20 INJECTION, SOLUTION INTRAVENOUS at 08:18

## 2023-01-19 RX ADMIN — HEPARIN SODIUM 5000 UNITS: 5000 INJECTION INTRAVENOUS; SUBCUTANEOUS at 22:01

## 2023-01-19 RX ADMIN — THERA TABS 1 TABLET: TAB at 08:17

## 2023-01-19 RX ADMIN — CYANOCOBALAMIN TAB 1000 MCG 1000 MCG: 1000 TAB at 08:16

## 2023-01-19 RX ADMIN — SODIUM CHLORIDE, PRESERVATIVE FREE 10 ML: 5 INJECTION INTRAVENOUS at 06:00

## 2023-01-19 RX ADMIN — POLYETHYLENE GLYCOL 3350 17 G: 17 POWDER, FOR SOLUTION ORAL at 08:13

## 2023-01-19 RX ADMIN — FUROSEMIDE 20 MG: 10 INJECTION, SOLUTION INTRAMUSCULAR; INTRAVENOUS at 16:54

## 2023-01-19 RX ADMIN — HEPARIN SODIUM 5000 UNITS: 5000 INJECTION INTRAVENOUS; SUBCUTANEOUS at 16:55

## 2023-01-19 RX ADMIN — SODIUM CHLORIDE, PRESERVATIVE FREE 10 ML: 5 INJECTION INTRAVENOUS at 22:00

## 2023-01-19 RX ADMIN — FOLIC ACID 1 MG: 1 TABLET ORAL at 08:18

## 2023-01-19 RX ADMIN — SODIUM CHLORIDE, PRESERVATIVE FREE 10 ML: 5 INJECTION INTRAVENOUS at 16:46

## 2023-01-19 RX ADMIN — TAMSULOSIN HYDROCHLORIDE 0.4 MG: 0.4 CAPSULE ORAL at 08:15

## 2023-01-19 RX ADMIN — BISACODYL 10 MG: 5 TABLET, COATED ORAL at 08:16

## 2023-01-19 RX ADMIN — METOPROLOL TARTRATE 25 MG: 25 TABLET, FILM COATED ORAL at 08:17

## 2023-01-19 NOTE — PROGRESS NOTES
Nutrition Note    Positive nutrition screen noted, MST: wt loss. RD already following, please see nutrition eval from 1/17. Per flowsheet, pt ate % of breakfast this AM (improved), 1-25% and 26-50% 1/18. Will continue to follow per policy. Nutrition Recommendations/Plan:   Continue current diet as tolerated. Encourage PO intake. Continue folic acid, thiamine, multivitamin supplementation daily. Monitor PO intake, weight, labs and plan of care during admission.          Electronically signed by Halie John RD on 1/19/2023 at 1:13 PM    Contact: [phone]

## 2023-01-19 NOTE — PROGRESS NOTES
Problem: Self Care Deficits Care Plan (Adult)  Goal: *Acute Goals and Plan of Care (Insert Text)  Description: Occupational Therapy Goals  Initiated 1/17/2023 within 7 day(s). 1.  Patient will perform grooming at sink in stance with G balance x 2-3 min with supervision/set-up. 2.  Patient will perform upper body dressing with modified independence. 3.  Patient will perform lower body dressing with supervision/set-up. 4.  Patient will perform toilet transfers with supervision/set-up. 5.  Patient will perform all aspects of toileting with supervision/set-up. 6.  Patient will utilize energy conservation techniques and sternal precautions during functional activities with verbal cues. Prior Level of Function:Pt reports independence at home      Outcome: Progressing Towards Goal   OCCUPATIONAL THERAPY TREATMENT    Patient: Nelson Stevenson (2 y.o. male)  Date: 1/19/2023  Diagnosis: Acute ST elevation myocardial infarction (STEMI) involving other coronary artery of inferior wall (HCC) [I21.19]  STEMI (ST elevation myocardial infarction) ([facility] [address] [I21.3] <principal problem not specified>  Procedure(s) (LRB):  CORONARY ARTERY BYPASS GRAFT (CABG) TIMES THREE (N/A)  ESOPHAGEAL TRANS ECHOCARDIOGRAM (N/A) 3 Days Post-Op  Precautions: Fall, Skin, Sternal  PLOF: Pt reports independence at home     Chart, occupational therapy assessment, plan of care, and goals were reviewed. ASSESSMENT:  Pt presented supine in bed upon entry, RN present, and pt requesting to use BSC. Reviewed sternal precautions and EC techniques during all ADL tasks. Pt continues to require mod cueing throughout tx session to utilize heart hugger. He came to EOB SBA in prep for toileting task. STS transfer CGA with Rollator and maneuvered to toilet ~ 10 ft w/ SBA. He performed toilet transfer CGA. Pt Supervision with toileting while seated then given CGA once in stance 2/2 slight unsteadiness.  PT presented in room and joined session for increased safety and participation. Pt maneuvered to sink side and performed hand hygiene with SBA/CGA for ~ 2 mins, pt with 1 slight LOB and able to self correct. Pt returned to reclining chair for seated rest break. Pt was educated on and issued AE for LB ADLs (reacher, sock aid, long handled sponge). Following education pt was able to don socks with SBA using AE. Once finished, he maneuvered in room and hallway SBA w/ Rollator to improve overall functional activity tolerance needed for self cares. He returned back to reclining chair at end of session and left with all needs within reach. RN made aware. Progression toward goals:  []          Improving appropriately and progressing toward goals  [x]          Improving slowly and progressing toward goals  []          Not making progress toward goals and plan of care will be adjusted     PLAN:  Patient continues to benefit from skilled intervention to address the above impairments. Continue treatment per established plan of care. Further Equipment Recommendations for Discharge:  [facility]: Current research shows that an AM-PAC score of 18 or greater is associated with a discharge to the patient's home setting. Based on an AM-PAC score of 19/24 and their current ADL deficits; it is recommended that the patient have 2-3 sessions per week of Occupational Therapy at d/c to increase the patient's independence. This [facility] score should be considered in conjunction with interdisciplinary team recommendations to determine the most appropriate discharge setting. Patient's social support, diagnosis, medical stability, and prior level of function should also be taken into consideration. SUBJECTIVE:   Patient stated Emily Lerner got my tubes out this morning.     OBJECTIVE DATA SUMMARY:   Cognitive/Behavioral Status:  Neurologic State: Alert  Orientation Level: Oriented X4  Cognition: Follows commands  Safety/Judgement: Fall prevention    Functional Mobility and Transfers for ADLs:   Bed Mobility:     Supine to Sit: Stand-by assistance     Scooting: Stand-by assistance   Transfers:  Sit to Stand: Contact guard assistance  Stand to Sit: Contact guard assistance      Toilet Transfer : Contact guard assistance          Balance:  Sitting: Intact  Standing: Impaired; With support  Standing - Static: Good  Standing - Dynamic : Fair    ADL Intervention:       Grooming  Position Performed: Standing  Washing Hands: Contact guard assistance    Lower Body Dressing Assistance  Socks: Stand-by assistance  Leg Crossed Method Used: No  Position Performed: Seated in chair  Cues: Visual cues provided;Verbal cues provided  Adaptive Equipment Used: Sock aid;Reacher    Toileting  Bowel Hygiene: Contact guard assistance  Clothing Management: Contact guard assistance         Pain:  Pain level pre-treatment: 0/10   Pain level post-treatment: 0/10      Activity Tolerance:    Fair   Please refer to the flowsheet for vital signs taken during this treatment. After treatment:   [x]  Patient left in no apparent distress sitting up in chair  []  Patient left in no apparent distress in bed  [x]  Call bell left within reach  [x]  Nursing notified  []  Caregiver present  []  Bed alarm activated    COMMUNICATION/EDUCATION:   [x] Role of Occupational Therapy in the acute care setting  [x] Home safety education was provided and the patient/caregiver indicated understanding. [x] Patient/family have participated as able in working towards goals and plan of care. [x] Patient/family agree to work toward stated goals and plan of care. [] Patient understands intent and goals of therapy, but is neutral about his/her participation. [] Patient is unable to participate in goal setting and plan of care.       Thank you for this referral.  ELIJAH York  Time Calculation: 31 mins    Kya Alvarado AM-PAC® Daily Activity Inpatient Short Form (6-Clicks)    How much HELP from another person does the patient currently need    (If the patient hasn't done an activity recently, how much help from another person do you think he/she would need if he/she tried?)   Total (Total A or Dep)   A Lot  (Mod to Max A)   A Little (Sup or Min A)   None (Mod I to I)   Putting on and taking off regular lower body clothing? [] 1 [] 2 [x] 3 [] 4   2. Bathing (including washing, rinsing,      drying)? [] 1 [] 2 [x] 3 [] 4   3. Toileting, which includes using toilet, bedpan or urinal?   [] 1 [] 2 [x] 3 [] 4   4. Putting on and taking off regular upper body clothing? [] 1 [] 2 [x] 3 [] 4   5. Taking care of personal grooming such as brushing teeth? [] 1 [] 2 [x] 3 [] 4   6. Eating meals?    [] 1 [] 2 [] 3 [x] 4

## 2023-01-19 NOTE — PROGRESS NOTES
CARDIAC SURGERY PROGRESS NOTE    2023  9:32 AM     CC:  Post Operative Day # 3     Chart, images and labs reviewed. Discussed with available staff. Interval History/Events of Past 24 hours:    Walks in [place] on [place]. Good UO on lasix. Beer TID. Subjective:  Patient seen and examined on rounds today. No complaints  Objective:  Vital signs:   Visit Vitals  BP (!) 141/92   Pulse 99   Temp 98.4 °F (36.9 °C)   Resp 28   Ht 6' 1\" (1.854 m)   Wt 83.6 kg (184 lb 6.4 oz)   SpO2 98%   BMI 24.33 kg/m²     Temp (24hrs), Av.8 °F (37.1 °C), Min:98.4 °F (36.9 °C), Max:99.1 °F (37.3 °C)    Admission Weight: Last Weight   Weight: 81.8 kg (180 lb 6.4 oz) Weight: 83.6 kg (184 lb 6.4 oz)     Telemetry: SR 90    Physical Examination:     General:  Alert, oriented   Lungs: Clear to auscultation without rales, wheezes or rhonchi. Chest: Dressings clean and dry. Midline incision healing well. Sternum stable. Heart: regular rate and rhythm, No murmur. Abdomen: Soft and non-tender without masses. Bowel sounds present. Extremities: Warm and well perfused. Edema absent. Incisions: clean, dry, no drainage. Neuro: No deficit. Labs:  Lab Results   Component Value Date/Time    WBC 7.0 2023 05:30 AM    HCT 24.0 (L) 2023 05:30 AM     2023 05:30 AM      Lab Results   Component Value Date/Time     (L) 2023 05:30 AM    K 3.9 2023 05:30 AM     2023 05:30 AM    CO2 23 2023 05:30 AM    GLU 88 2023 05:30 AM    BUN 9 2023 05:30 AM    CREA 0.54 (L) 2023 05:30 AM    CREA 0.65 2023 05:10 AM    CREA 0.58 (L) 2023 03:43 PM     CXR: No ptx or effusion. Air/stool in colon splenic flex       Assessment:  S/P  CABG x 3  Respiratory parameters stable  Cardiac status stable     Plans:  1. Lasix and K  2.  cathartics  3. Home soon     Kevin Peña PA-C    PLEASE NOTE:  This document has been produced using voice recognition software. Unrecognized errors in transcription may be present. NOTE TO PATIENT:  The purpose of this note is to communicate optimally with the other providers involved in your care. It is written using standard medical terminology. If you have questions regarding details of the note please call my office at [phone] and make an appointment to discuss your concerns.

## 2023-01-19 NOTE — PROGRESS NOTES
Cardiology Progress Note    Admit Date: 1/15/2023  Attending Cardiologist: Dr. Arie Ramos     Assessment:     -CAD, s/p CABGx3 1/16/23, LIMA-LAD, SVG-Ramus intermedius, SVG-posterior descending branch of RCA. -Inferior STEMI 1/15/23 [facility]). Presented with CP, underwent emergent LHC with RCA balloon angioplasty. LHC findings as noted below. Transferred to [facility] for CABG.   100% occluded proximal/mid very calcified large RCA. Baseline CAROLINE 0 flow post angioplasty CAROLINE-3 flow. Due to severe calcification stent is unable to cross the lesion. Moderate-sized ramus intermedius have a 80% calcified ostial/proximal disease. Large sized mid/distal LAD have 80% disease. Large left main artery have ostial 40% to 50% disease in cranial views with nonischemic IFR of 0.96. LVEDP 16 mmHg. -Transient atrial fibrillation: Postoperatively on day 1. -HTN   -DM2   -Active tobacco abuse. -Alcohol abuse. Primary cardiologist is Dr. Bulmaro Joseph:     POD #3, Continue routine post operative care per CTS team.   Increase activity as tolerated. Chest tubes removed. Kim removed  Encourage ICS. Rhythm stable overnight, remains in SR. Continue BB, amiodarone. Continue ASA, plavix, statin. Received 40 mg of Lasix a day. Continue supportive care    Subjective:     Doing well, ambulating in the hallway. Denies any chest pain or chest tightness.     Objective:      Patient Vitals for the past 8 hrs:   Temp Pulse Resp BP SpO2   01/19/23 0815 -- 99 -- (!) 141/92 --   01/19/23 0800 98.4 °F (36.9 °C) 87 28 (!) 141/92 98 %   01/19/23 0700 -- 90 19 (!) 144/97 97 %   01/19/23 0500 -- 85 19 (!) 142/93 97 %   01/19/23 0400 99 °F (37.2 °C) 85 22 (!) 148/95 100 %   01/19/23 0300 -- 85 20 (!) 138/92 100 %   01/19/23 0200 -- 86 24 (!) 145/92 99 %   01/19/23 0100 -- 85 19 (!) 144/96 98 %           Patient Vitals for the past 96 hrs:   Weight   01/19/23 0500 83.6 kg (184 lb 6.4 oz)   01/18/23 0519 84.2 kg (185 lb 11.2 oz) 01/17/23 0530 81.8 kg (180 lb 6.4 oz)         TELE: SR               Current Facility-Administered Medications   Medication Dose Route Frequency Last Admin    furosemide (LASIX) injection 20 mg  20 mg IntraVENous Q8H 20 mg at 01/19/23 0817    potassium chloride (K-DUR, KLOR-CON M20) SR tablet 40 mEq  40 mEq Oral BID 40 mEq at 01/19/23 0815    metoprolol tartrate (LOPRESSOR) tablet 25 mg  25 mg Oral BID 25 mg at 01/19/23 0817    pantoprazole (PROTONIX) tablet 40 mg  40 mg Oral DAILY 40 mg at 01/19/23 0816    insulin lispro (HUMALOG) injection   SubCUTAneous AC&HS      glucose chewable tablet 16 g  4 Tablet Oral PRN      glucagon (GLUCAGEN) injection 1 mg  1 mg IntraMUSCular PRN      cyanocobalamin tablet 1,000 mcg  1,000 mcg Oral DAILY 1,000 mcg at 01/19/23 0816    iron sucrose (VENOFER) injection 200 mg  200 mg IntraVENous Q24H 200 mg at 01/19/23 0818    clopidogreL (PLAVIX) tablet 75 mg  75 mg Oral DAILY 75 mg at 01/19/23 0817    HYDROcodone-acetaminophen (NORCO) 5-325 mg per tablet 1-2 Tablet  1-2 Tablet Oral Q6H PRN 2 Tablet at 01/19/23 0110    bisacodyL (DULCOLAX) suppository 10 mg  10 mg Rectal DAILY 10 mg at 01/17/23 1657    tamsulosin (FLOMAX) capsule 0.4 mg  0.4 mg Oral DAILY 0.4 mg at 01/19/23 0815    albumin human 5% (BUMINATE) solution 12.5 g  12.5 g IntraVENous Q20MIN PRN 12.5 g at 01/16/23 2353    sodium chloride (NS) flush 5-40 mL  5-40 mL IntraVENous Q8H 10 mL at 01/19/23 0600    sodium chloride (NS) flush 5-40 mL  5-40 mL IntraVENous PRN      acetaminophen (TYLENOL) tablet 650 mg  650 mg Oral Q4H PRN      morphine injection 2-4 mg  2-4 mg IntraVENous Q4H PRN 4 mg at 01/18/23 2350    naloxone (NARCAN) injection 0.4 mg  0.4 mg IntraVENous PRN      mupirocin (BACTROBAN) 2 % ointment   Both Nostrils BID Given at 01/19/23 0818    amiodarone (CORDARONE) tablet 200 mg  200 mg Oral  mg at 01/19/23 0815    albuterol (PROVENTIL VENTOLIN) nebulizer solution 2.5 mg  2.5 mg Nebulization Q4H PRN ondansetron (ZOFRAN) injection 4 mg  4 mg IntraVENous Q4H PRN      aspirin delayed-release tablet 81 mg  81 mg Oral DAILY 81 mg at 01/19/23 0815    chlorhexidine (PERIDEX) 0.12 % mouthwash 10 mL  10 mL Oral BID 10 mL at 01/19/23 0817    docusate sodium (COLACE) capsule 100 mg  100 mg Oral  mg at 01/19/23 0816    ELECTROLYTE REPLACEMENT PROTOCOL - Potassium Standard Dosing  1 Each Other PRN      ELECTROLYTE REPLACEMENT PROTOCOL - Magnesium  1 Each Other PRN      heparin (porcine) injection 5,000 Units  5,000 Units SubCUTAneous Q8H 5,000 Units at 01/19/23 0817    dextrose 10% infusion 0-250 mL  0-250 mL IntraVENous PRN      bisacodyL (DULCOLAX) tablet 10 mg  10 mg Oral DAILY 10 mg at 01/19/23 0816    polyethylene glycol (MIRALAX) packet 17 g  17 g Oral DAILY 17 g at 01/19/23 0813    atorvastatin (LIPITOR) tablet 80 mg  80 mg Oral DAILY 80 mg at 86/81/30 0364    folic acid (FOLVITE) tablet 1 mg  1 mg Oral DAILY 1 mg at 01/19/23 0818    thiamine HCL (B-1) tablet 100 mg  100 mg Oral DAILY 100 mg at 01/19/23 0815    therapeutic multivitamin [facility]) tablet 1 Tablet  1 Tablet Oral DAILY 1 Tablet at 01/19/23 0817         Intake/Output Summary (Last 24 hours) at 1/19/2023 0853  Last data filed at 1/19/2023 0800  Gross per 24 hour   Intake 0 ml   Output 2835 ml   Net -2835 ml         Physical Exam:  General:  alert, cooperative, no distress, appears stated age  Neck:  no JVD  Lungs:  diminished B/L   Heart:  RRR  Abdomen:  abdomen is soft without significant tenderness, masses, organomegaly or guarding  Extremities:  extremities normal, atraumatic, no cyanosis or edema    Visit Vitals  BP (!) 141/92   Pulse 99   Temp 98.4 °F (36.9 °C)   Resp 28   Ht 6' 1\" (1.854 m)   Wt 83.6 kg (184 lb 6.4 oz)   SpO2 98%   BMI 24.33 kg/m²       Data Review:     Labs: Results:       Chemistry Recent Labs     01/19/23  0530 01/18/23  0510 01/17/23  1543 01/17/23  0630 01/16/23  1320   GLU 88 127* 113* 129* 104*   * 132* 134* 136 137   K 3.9 3.7 3.8 3.8 3.8    102 104 107 102   CO2 23 22 25 27 26   BUN 9 7 8 6* 5*   CREA 0.54* 0.65 0.58* 0.74 0.81   CA 9.1 9.1 8.6 8.6 8.8   MG  --   --   --  1.9 2.6   AGAP 6 8 5 2* 9   BUCR 17 11* 14 8* 6*   AP  --   --   --  53 61   TP  --   --   --  6.4 5.5*   ALB  --   --   --  3.7 3.3*   GLOB  --   --   --  2.7 2.2   AGRAT  --   --   --  1.4 1.5        CBC w/Diff Recent Labs     01/19/23  0530 01/18/23  0510 01/17/23  0630   WBC 7.0 8.2 6.3   RBC 2.63* 2.89* 3.08*   HGB 8.6* 9.6* 10.1*   HCT 24.0* 26.3* 27.9*    140 142   GRANS 68 76* 75*   LYMPH 20* 14* 14*   EOS 1 1 0        Cardiac Enzymes No results found for: CPK, CK, CKMMB, CKMB, RCK3, CKMBT, CKNDX, CKND1, YARELIS, TROPT, TROIQ, ARIELA, TROPT, TNIPOC, BNP, BNPP   Coagulation Recent Labs     01/16/23  1320   PTP 17.2*   INR 1.4*   APTT 32.2         Lipid Panel Lab Results   Component Value Date/Time    Cholesterol, total 140 03/01/2014 04:28 AM    HDL Cholesterol 27 (L) 03/01/2014 04:28 AM    LDL, calculated 74.2 03/01/2014 04:28 AM    VLDL, calculated 38.8 03/01/2014 04:28 AM    Triglyceride 194 (H) 03/01/2014 04:28 AM    CHOL/HDL Ratio 5.2 (H) 03/01/2014 04:28 AM      BNP No results found for: BNP, BNPP, XBNPT   Liver Enzymes Recent Labs     01/17/23  0630   TP 6.4   ALB 3.7   AP 53        Thyroid Studies No results found for: T4, T3U, TSH, TSHEXT, TSHEXT

## 2023-01-19 NOTE — PROGRESS NOTES
Pt had and uneventful night. Maintained Sinus rhythm with normal BP, minimal chest tube output, adequate urine output, CHG bath, weight on standing scale up to chair. Temp:  [96.4 °F (35.8 °C)-99.9 °F (37.7 °C)]   Pulse (Heart Rate):  []   BP: ()/(67-99)   Resp Rate:  [10-31]   O2 Sat (%):  [82 %-100 %]   Weight:  [81.8 kg (180 lb 6.4 oz)-84.2 kg (185 lb 11.2 oz)]   01/18 1901 - 01/19 0700  In: -   Out: 808 [Urine:405]  01/17 0701 - 01/18 1900  In: 3254.3 [P.O.:1680; I.V.:1574.3]  Out: 3790 [Urine:3220]      Objective:  Vital signs: (most recent): Blood pressure (!) 142/93, pulse 85, temperature 99 °F (37.2 °C), resp. rate 19, height 6' 1\" (1.854 m), weight 83.6 kg (184 lb 6.4 oz), SpO2 97 %.         Active Problems:    [facility] (1[facility])      Overview: Added automatically from request for surgery [id number]

## 2023-01-19 NOTE — PROGRESS NOTES
0700-received shift report from off going nurse Holli Fraire RN. Pt in recliner resting quietly with no complaints at this time. 0740- at bedside. 0800-removed stevenson per provider order. 0850-pt ambulated in the hallway ~350 feet. Pt tolerated well. 0920-LYDIA Madrid at pt bedside. Chest tubes removed. 0935-administered soap suds enema per order. 1000-PT/OT at the bedside. Pt assisted to the bathroom for BM. Pt ambulated in the hallway. 1330-pt ambulated in the hallway ~450 feet. Pt tolerated well. 1800-pt ambulated in the hallway ~450 feet. Pt tolerated well.

## 2023-01-19 NOTE — PROGRESS NOTES
Problem: Mobility Impaired (Adult and Pediatric)  Goal: *Acute Goals and Plan of Care (Insert Text)  Description: Physical Therapy Goals  Initiated 1/17/2023 and to be accomplished within 7 day(s)  1. Patient will move from supine to sit and sit to supine  in bed with modified independence. 2.  Patient will transfer from bed to chair and chair to bed with modified independence using the least restrictive device. 3.  Patient will perform sit to stand with modified independence. 4.  Patient will ambulate with modified independence for 300 feet with the least restrictive device. PLOF: Pt lives with his wife in a [facility] with ramp entry. Indep PTA. Outcome: Progressing Towards Goal   PHYSICAL THERAPY TREATMENT    Patient: David Rapp (12 y.o. male)  Date: 1/19/2023  Diagnosis: Acute ST elevation myocardial infarction (STEMI) involving other coronary artery of inferior wall (HCC) [I21.19]  STEMI (ST elevation myocardial infarction) ([facility] [address] [I21.3] <principal problem not specified>  Procedure(s) (LRB):  CORONARY ARTERY BYPASS GRAFT (CABG) TIMES THREE (N/A)  ESOPHAGEAL TRANS ECHOCARDIOGRAM (N/A) 3 Days Post-Op  Precautions: Fall, Skin, Sternal      ASSESSMENT:  Pt cleared to participate in PT session, pt received on toilet with OT present agreeable to therapy session. Completing with OT to maximize safety and mobility. Standing with CGA and able to perform pericare in standing. Pt then ambulating x400 feet with slow gait speed, rollator and SBA. Pt then ascending/descending 5 steps with L handrail and SBA. Pt returned to sitting in recliner, discussed working on LE exercise throughout the day. Pt positioned for comfort and educated to call for assist before getting up, pt verbalized understanding. Pt left with all needs met and call bell in reach. RN notified of position and participation.      Progression toward goals:   []      Improving appropriately and progressing toward goals  [x]      Improving slowly and progressing toward goals  []      Not making progress toward goals and plan of care will be adjusted     PLAN:  Patient continues to benefit from skilled intervention to address the above impairments. Continue treatment per established plan of care. Further Equipment Recommendations for Discharge:  rolling walker    AMPAC:   Current research shows that an AM-PAC score of 18 or greater is associated with a discharge to the patient's home setting. Based on an AM-PAC score of 18/24 and their current functional mobility deficits, it is recommended that the patient have 2-3 sessions per week of Physical Therapy at d/c to increase the patient's independence. This AMPAC score should be considered in conjunction with interdisciplinary team recommendations to determine the most appropriate discharge setting. Patient's social support, diagnosis, medical stability, and prior level of function should also be taken into consideration. SUBJECTIVE:   Patient stated Am I going to see you again? Marielena Herrmann    OBJECTIVE DATA SUMMARY:   Critical Behavior:  Neurologic State: Alert  Orientation Level: Oriented X4  Cognition: Follows commands  Safety/Judgement: Fall prevention  Functional Mobility Training:  Bed Mobility:     Supine to Sit: Stand-by assistance     Scooting: Stand-by assistance    Transfers:  Sit to Stand: Contact guard assistance  Stand to Sit: Contact guard assistance    Balance:  Sitting: Intact  Standing: Impaired; With support  Standing - Static: Good  Standing - Dynamic : Fair     Ambulation/Gait Training:  Distance (ft): 400 Feet (ft)  Assistive Device: Walker, rollator  Ambulation - Level of Assistance: Stand-by assistance    Gait Abnormalities: Decreased step clearance    Speed/Keri: Slow  Step Length: Right shortened;Left shortened    Stairs:  Number of Stairs Trained: 5  Stairs - Level of Assistance: Stand-by assistance  Rail Use: Left       Pain:  Pain level pre-treatment: 0/10  Pain level post-treatment: 0/10       Activity Tolerance:   Fair tolerance     Please refer to the flowsheet for vital signs taken during this treatment. After treatment:   [] Patient left in no apparent distress sitting up in chair  [x] Patient left in no apparent distress in bed  [x] Call bell left within reach  [x] Nursing notified  [] Caregiver present  [] Bed alarm activated  [] SCDs applied      COMMUNICATION/EDUCATION:   [x]         Role of Physical Therapy in the acute care setting. [x]         Fall prevention education was provided and the patient/caregiver indicated understanding. [x]         Patient/family have participated as able in working toward goals and plan of care. [x]         Patient/family agree to work toward stated goals and plan of care. []         Patient understands intent and goals of therapy, but is neutral about his/her participation. []         Patient is unable to participate in stated goals/plan of care: ongoing with therapy staff.  []         Other:        Anamika Duron, PT   Time Calculation: 23 mins    [employer] AM-PAC® Basic Mobility Inpatient Short Form (6-Clicks) Version 2    How much HELP from another person does the patient currently need    (If the patient hasn't done an activity recently, how much help from another person do you think he/she would need if he/she tried?)   Total (Total A or Dep)   A Lot  (Mod to Max A)   A Little (Sup or Min A)   None (Mod I to I)   Turning from your back to your side while in a flat bed without using bedrails? [] 1 [] 2 [x] 3 [] 4   2. Moving from lying on your back to sitting on the side of a flat bed without using bedrails? [] 1 [] 2 [x] 3 [] 4   3. Moving to and from a bed to a chair (including a wheelchair)? [] 1 [] 2 [x] 3 [] 4   4. Standing up from a chair using your arms (e.g., wheelchair, or bedside chair)? [] 1 [] 2 [x] 3 [] 4   5. Walking in hospital room? [] 1 [] 2 [x] 3 [] 4   6.  Climbing 3-5 steps with a railing?+   [] 1 [] 2 [x] 3 [] 4   +If stair climbing cannot be assessed, skip item #6. Sum responses from items 1-5.

## 2023-01-20 ENCOUNTER — APPOINTMENT (OUTPATIENT)
Dept: GENERAL RADIOLOGY | Age: 63
DRG: 235 | End: 2023-01-20
Attending: PHYSICIAN ASSISTANT
Payer: OTHER GOVERNMENT

## 2023-01-20 LAB
AMPHET UR QL SCN: NEGATIVE
ANION GAP SERPL CALC-SCNC: 6 MMOL/L (ref 3–18)
BARBITURATES UR QL SCN: NEGATIVE
BASOPHILS # BLD: 0 K/UL (ref 0–0.1)
BASOPHILS NFR BLD: 0 % (ref 0–2)
BENZODIAZ UR QL: NEGATIVE
BENZODIAZ UR QL: POSITIVE
BUN SERPL-MCNC: 11 MG/DL (ref 7–18)
BUN/CREAT SERPL: 20 (ref 12–20)
CALCIUM SERPL-MCNC: 9 MG/DL (ref 8.5–10.1)
CANNABINOIDS UR QL CFM: POSITIVE
CANNABINOIDS UR QL SCN: POSITIVE
CHLORIDE SERPL-SCNC: 103 MMOL/L (ref 100–111)
CO2 SERPL-SCNC: 24 MMOL/L (ref 21–32)
COCAINE UR QL SCN: NEGATIVE
CREAT SERPL-MCNC: 0.55 MG/DL (ref 0.6–1.3)
DIFFERENTIAL METHOD BLD: ABNORMAL
EOSINOPHIL # BLD: 0.1 K/UL (ref 0–0.4)
EOSINOPHIL NFR BLD: 2 % (ref 0–5)
ERYTHROCYTE [DISTWIDTH] IN BLOOD BY AUTOMATED COUNT: 12.5 % (ref 11.6–14.5)
GLUCOSE BLD STRIP.AUTO-MCNC: 103 MG/DL (ref 70–110)
GLUCOSE BLD STRIP.AUTO-MCNC: 139 MG/DL (ref 70–110)
GLUCOSE BLD STRIP.AUTO-MCNC: 88 MG/DL (ref 70–110)
GLUCOSE BLD STRIP.AUTO-MCNC: 89 MG/DL (ref 70–110)
GLUCOSE SERPL-MCNC: 69 MG/DL (ref 74–99)
HCT VFR BLD AUTO: 23.7 % (ref 36–48)
HCV RNA SERPL NAA+PROBE-LOG IU: NOT DETECTED HCV LOG 10IU/ML
HCV RNA SERPL PROBE AMP-ACNC: NOT DETECTED HCVIU/ML
HCV RNA SERPL QL NAA+PROBE: NOT DETECTED
HDSCOM,HDSCOM: ABNORMAL
HGB BLD-MCNC: 8.6 G/DL (ref 13–16)
IMM GRANULOCYTES # BLD AUTO: 0.1 K/UL (ref 0–0.04)
IMM GRANULOCYTES NFR BLD AUTO: 1 % (ref 0–0.5)
LYMPHOCYTES # BLD: 1.5 K/UL (ref 0.9–3.6)
LYMPHOCYTES NFR BLD: 25 % (ref 21–52)
MCH RBC QN AUTO: 33.3 PG (ref 24–34)
MCHC RBC AUTO-ENTMCNC: 36.3 G/DL (ref 31–37)
MCV RBC AUTO: 91.9 FL (ref 78–100)
METHADONE UR QL: NEGATIVE
MONOCYTES # BLD: 0.8 K/UL (ref 0.05–1.2)
MONOCYTES NFR BLD: 13 % (ref 3–10)
NEUTS SEG # BLD: 3.7 K/UL (ref 1.8–8)
NEUTS SEG NFR BLD: 60 % (ref 40–73)
NRBC # BLD: 0 K/UL (ref 0–0.01)
NRBC BLD-RTO: 0 PER 100 WBC
OPIATES UR QL: NEGATIVE
PCP UR QL: NEGATIVE
PLATELET # BLD AUTO: 170 K/UL (ref 135–420)
PMV BLD AUTO: 9.4 FL (ref 9.2–11.8)
POTASSIUM SERPL-SCNC: 4.1 MMOL/L (ref 3.5–5.5)
RBC # BLD AUTO: 2.58 M/UL (ref 4.35–5.65)
SODIUM SERPL-SCNC: 133 MMOL/L (ref 136–145)
THC UR CFM-MCNC: 39 NG/ML
WBC # BLD AUTO: 6.2 K/UL (ref 4.6–13.2)

## 2023-01-20 PROCEDURE — 97535 SELF CARE MNGMENT TRAINING: CPT

## 2023-01-20 PROCEDURE — 97530 THERAPEUTIC ACTIVITIES: CPT

## 2023-01-20 PROCEDURE — 74011250636 HC RX REV CODE- 250/636: Performed by: PHYSICIAN ASSISTANT

## 2023-01-20 PROCEDURE — 74011250636 HC RX REV CODE- 250/636: Performed by: THORACIC SURGERY (CARDIOTHORACIC VASCULAR SURGERY)

## 2023-01-20 PROCEDURE — 74011250637 HC RX REV CODE- 250/637: Performed by: PHYSICIAN ASSISTANT

## 2023-01-20 PROCEDURE — 74011250637 HC RX REV CODE- 250/637: Performed by: THORACIC SURGERY (CARDIOTHORACIC VASCULAR SURGERY)

## 2023-01-20 PROCEDURE — 97116 GAIT TRAINING THERAPY: CPT

## 2023-01-20 PROCEDURE — 82962 GLUCOSE BLOOD TEST: CPT

## 2023-01-20 PROCEDURE — 71045 X-RAY EXAM CHEST 1 VIEW: CPT

## 2023-01-20 PROCEDURE — 74011000250 HC RX REV CODE- 250: Performed by: PHYSICIAN ASSISTANT

## 2023-01-20 PROCEDURE — 99024 POSTOP FOLLOW-UP VISIT: CPT | Performed by: PHYSICIAN ASSISTANT

## 2023-01-20 PROCEDURE — 99232 SBSQ HOSP IP/OBS MODERATE 35: CPT | Performed by: INTERNAL MEDICINE

## 2023-01-20 PROCEDURE — APPNB30 APP NON BILLABLE TIME 0-30 MINS: Performed by: PHYSICIAN ASSISTANT

## 2023-01-20 PROCEDURE — 85025 COMPLETE CBC W/AUTO DIFF WBC: CPT

## 2023-01-20 PROCEDURE — 65620000000 HC RM CCU GENERAL

## 2023-01-20 PROCEDURE — 80048 BASIC METABOLIC PNL TOTAL CA: CPT

## 2023-01-20 RX ORDER — BISACODYL 5 MG
10 TABLET, DELAYED RELEASE (ENTERIC COATED) ORAL DAILY
Status: DISCONTINUED | OUTPATIENT
Start: 2023-01-20 | End: 2023-01-21 | Stop reason: HOSPADM

## 2023-01-20 RX ORDER — AMIODARONE HYDROCHLORIDE 200 MG/1
200 TABLET ORAL 2 TIMES DAILY
Status: DISCONTINUED | OUTPATIENT
Start: 2023-01-20 | End: 2023-01-21 | Stop reason: HOSPADM

## 2023-01-20 RX ORDER — ATORVASTATIN CALCIUM 80 MG/1
80 TABLET, FILM COATED ORAL DAILY
Qty: 30 TABLET | Refills: 2 | Status: SHIPPED | OUTPATIENT
Start: 2023-01-20 | End: 2023-01-22 | Stop reason: SDUPTHER

## 2023-01-20 RX ORDER — HYDROCODONE BITARTRATE AND ACETAMINOPHEN 5; 325 MG/1; MG/1
1 TABLET ORAL
Qty: 28 TABLET | Refills: 0 | Status: SHIPPED | OUTPATIENT
Start: 2023-01-20 | End: 2023-01-20 | Stop reason: SDUPTHER

## 2023-01-20 RX ORDER — ATORVASTATIN CALCIUM 80 MG/1
80 TABLET, FILM COATED ORAL DAILY
Qty: 30 TABLET | Refills: 2 | Status: SHIPPED | OUTPATIENT
Start: 2023-01-20 | End: 2023-01-20

## 2023-01-20 RX ORDER — HYDROCODONE BITARTRATE AND ACETAMINOPHEN 5; 325 MG/1; MG/1
1 TABLET ORAL
Qty: 28 TABLET | Refills: 0 | Status: SHIPPED | OUTPATIENT
Start: 2023-01-20 | End: 2023-01-27

## 2023-01-20 RX ORDER — ATORVASTATIN CALCIUM 80 MG/1
80 TABLET, FILM COATED ORAL DAILY
Qty: 30 TABLET | Refills: 2 | Status: SHIPPED | OUTPATIENT
Start: 2023-01-20 | End: 2023-01-20 | Stop reason: SDUPTHER

## 2023-01-20 RX ORDER — METOPROLOL TARTRATE 50 MG/1
25 TABLET ORAL 2 TIMES DAILY
Qty: 60 TABLET | Refills: 2 | Status: SHIPPED | OUTPATIENT
Start: 2023-01-20 | End: 2023-01-20 | Stop reason: SDUPTHER

## 2023-01-20 RX ORDER — FACIAL-BODY WIPES
10 EACH TOPICAL DAILY PRN
Status: DISCONTINUED | OUTPATIENT
Start: 2023-01-20 | End: 2023-01-21 | Stop reason: HOSPADM

## 2023-01-20 RX ORDER — FUROSEMIDE 10 MG/ML
20 INJECTION INTRAMUSCULAR; INTRAVENOUS ONCE
Status: COMPLETED | OUTPATIENT
Start: 2023-01-20 | End: 2023-01-20

## 2023-01-20 RX ORDER — ASPIRIN 81 MG/1
81 TABLET ORAL DAILY
Qty: 30 TABLET | Refills: 2 | Status: SHIPPED | OUTPATIENT
Start: 2023-01-21 | End: 2023-01-22 | Stop reason: SDUPTHER

## 2023-01-20 RX ORDER — ASPIRIN 81 MG/1
81 TABLET ORAL DAILY
Qty: 30 TABLET | Refills: 2 | Status: SHIPPED | OUTPATIENT
Start: 2023-01-21 | End: 2023-01-20 | Stop reason: SDUPTHER

## 2023-01-20 RX ORDER — CLOPIDOGREL BISULFATE 75 MG/1
75 TABLET ORAL DAILY
Qty: 30 TABLET | Refills: 2 | Status: SHIPPED | OUTPATIENT
Start: 2023-01-21 | End: 2023-01-20 | Stop reason: SDUPTHER

## 2023-01-20 RX ORDER — METOPROLOL TARTRATE 50 MG/1
25 TABLET ORAL 2 TIMES DAILY
Qty: 60 TABLET | Refills: 2 | Status: SHIPPED | OUTPATIENT
Start: 2023-01-20 | End: 2023-01-21 | Stop reason: SDUPTHER

## 2023-01-20 RX ORDER — POTASSIUM CHLORIDE 20 MEQ/1
20 TABLET, EXTENDED RELEASE ORAL
Status: COMPLETED | OUTPATIENT
Start: 2023-01-20 | End: 2023-01-20

## 2023-01-20 RX ORDER — CLOPIDOGREL BISULFATE 75 MG/1
75 TABLET ORAL DAILY
Qty: 30 TABLET | Refills: 2 | Status: SHIPPED | OUTPATIENT
Start: 2023-01-21 | End: 2023-01-22 | Stop reason: SDUPTHER

## 2023-01-20 RX ADMIN — METOPROLOL TARTRATE 25 MG: 25 TABLET, FILM COATED ORAL at 09:22

## 2023-01-20 RX ADMIN — DOCUSATE SODIUM 100 MG: 100 CAPSULE, LIQUID FILLED ORAL at 09:22

## 2023-01-20 RX ADMIN — Medication 100 MG: at 09:22

## 2023-01-20 RX ADMIN — THERA TABS 1 TABLET: TAB at 09:22

## 2023-01-20 RX ADMIN — CYANOCOBALAMIN TAB 1000 MCG 1000 MCG: 1000 TAB at 09:22

## 2023-01-20 RX ADMIN — PANTOPRAZOLE SODIUM 40 MG: 40 TABLET, DELAYED RELEASE ORAL at 09:22

## 2023-01-20 RX ADMIN — POTASSIUM CHLORIDE 20 MEQ: 1500 TABLET, EXTENDED RELEASE ORAL at 09:22

## 2023-01-20 RX ADMIN — AMIODARONE HYDROCHLORIDE 200 MG: 200 TABLET ORAL at 18:13

## 2023-01-20 RX ADMIN — CLOPIDOGREL BISULFATE 75 MG: 75 TABLET ORAL at 09:22

## 2023-01-20 RX ADMIN — SODIUM CHLORIDE, PRESERVATIVE FREE 10 ML: 5 INJECTION INTRAVENOUS at 09:23

## 2023-01-20 RX ADMIN — HYDROCODONE BITARTRATE AND ACETAMINOPHEN 1 TABLET: 5; 325 TABLET ORAL at 00:37

## 2023-01-20 RX ADMIN — BISACODYL 10 MG: 10 SUPPOSITORY RECTAL at 10:35

## 2023-01-20 RX ADMIN — BISACODYL 10 MG: 5 TABLET, COATED ORAL at 09:22

## 2023-01-20 RX ADMIN — POLYETHYLENE GLYCOL 3350 17 G: 17 POWDER, FOR SOLUTION ORAL at 09:23

## 2023-01-20 RX ADMIN — CHLORHEXIDINE GLUCONATE 0.12% ORAL RINSE 10 ML: 1.2 LIQUID ORAL at 09:22

## 2023-01-20 RX ADMIN — METOPROLOL TARTRATE 25 MG: 25 TABLET, FILM COATED ORAL at 18:13

## 2023-01-20 RX ADMIN — FUROSEMIDE 20 MG: 10 INJECTION, SOLUTION INTRAMUSCULAR; INTRAVENOUS at 09:21

## 2023-01-20 RX ADMIN — DOCUSATE SODIUM 100 MG: 100 CAPSULE, LIQUID FILLED ORAL at 18:13

## 2023-01-20 RX ADMIN — HEPARIN SODIUM 5000 UNITS: 5000 INJECTION INTRAVENOUS; SUBCUTANEOUS at 23:24

## 2023-01-20 RX ADMIN — MUPIROCIN: 20 OINTMENT TOPICAL at 09:24

## 2023-01-20 RX ADMIN — AMIODARONE HYDROCHLORIDE 200 MG: 200 TABLET ORAL at 09:22

## 2023-01-20 RX ADMIN — ATORVASTATIN CALCIUM 80 MG: 40 TABLET, FILM COATED ORAL at 09:22

## 2023-01-20 RX ADMIN — ASPIRIN 81 MG: 81 TABLET, COATED ORAL at 09:22

## 2023-01-20 RX ADMIN — IRON SUCROSE 200 MG: 20 INJECTION, SOLUTION INTRAVENOUS at 09:21

## 2023-01-20 RX ADMIN — HEPARIN SODIUM 5000 UNITS: 5000 INJECTION INTRAVENOUS; SUBCUTANEOUS at 09:22

## 2023-01-20 RX ADMIN — SODIUM CHLORIDE, PRESERVATIVE FREE 10 ML: 5 INJECTION INTRAVENOUS at 18:13

## 2023-01-20 RX ADMIN — SODIUM CHLORIDE, PRESERVATIVE FREE 10 ML: 5 INJECTION INTRAVENOUS at 21:43

## 2023-01-20 RX ADMIN — FOLIC ACID 1 MG: 1 TABLET ORAL at 09:22

## 2023-01-20 RX ADMIN — CHLORHEXIDINE GLUCONATE 0.12% ORAL RINSE 10 ML: 1.2 LIQUID ORAL at 18:12

## 2023-01-20 RX ADMIN — HEPARIN SODIUM 5000 UNITS: 5000 INJECTION INTRAVENOUS; SUBCUTANEOUS at 18:13

## 2023-01-20 RX ADMIN — TAMSULOSIN HYDROCHLORIDE 0.4 MG: 0.4 CAPSULE ORAL at 09:22

## 2023-01-20 RX ADMIN — MUPIROCIN: 20 OINTMENT TOPICAL at 18:14

## 2023-01-20 NOTE — PROGRESS NOTES
1915: Bedside shift change report given to this nurse (oncoming nurse) by NINO Hylton (offgoing nurse). Report included the following information SBAR, Kardex, and Cardiac Rhythm NSR . Wound Prevention Checklist    Patient: Katiana Adkins (89 y.o. male)  Date: 1/19/2023  Diagnosis: Acute ST elevation myocardial infarction (STEMI) involving other coronary artery of inferior wall (HCC) [I21.19]  STEMI (ST elevation myocardial infarction) ([facility] [address] [I21.3] <principal problem not specified>    Precautions: Fall, Skin, Sternal       []  Heel prevention boots placed on patient    [x]  Patient turned q2h during shift    []  Lift team ordered    [x]  Patient on [place] bed/Specialty bed    [x]  Each Wound is documented during shift (Stage, Color, drainage, odor, measurements, and dressings)    [x]  Dual skin check done with NINO Bonilla RN  Patient resting in bed at this time, denies pain at this time. O2 sats in mid 90's on RA. NSR on monitor. Voiding in urinal. Patient compliant with fluid restriction at this time. 2200: Patient resting in bed, no distress noted at this time. 0040: Patient requested to sit in recliner chair due to feeling uncomfortable in bed. Patient transferred to recliner chair with assist x 1 staff. No desaturation, orthostatic hypotension, or distress noted. Patient using heart hugger appropriately. PRN pain medication given per order. 0300: Patient sleeping up in recliner chair, no distress noted. 0700:Report given to WILBERT Anderson

## 2023-01-20 NOTE — PROGRESS NOTES
Problem: Mobility Impaired (Adult and Pediatric)  Goal: *Acute Goals and Plan of Care (Insert Text)  Description: Physical Therapy Goals  Initiated 1/17/2023 and to be accomplished within 7 day(s)  1. Patient will move from supine to sit and sit to supine  in bed with modified independence. 2.  Patient will transfer from bed to chair and chair to bed with modified independence using the least restrictive device. 3.  Patient will perform sit to stand with modified independence. 4.  Patient will ambulate with modified independence for 300 feet with the least restrictive device. PLOF: Pt lives with his wife in a [facility] with ramp entry. Indep PTA. Outcome: Progressing Towards Goal     PHYSICAL THERAPY TREATMENT AND DISCHARGE    Patient: Lizette Larsen (49 y.o. male)  Date: 1/20/2023  Diagnosis: Acute ST elevation myocardial infarction (STEMI) involving other coronary artery of inferior wall (HCC) [I21.19]  STEMI (ST elevation myocardial infarction) ([facility] [address] [I21.3] <principal problem not specified>  Procedure(s) (LRB):  CORONARY ARTERY BYPASS GRAFT (CABG) TIMES THREE (N/A)  ESOPHAGEAL TRANS ECHOCARDIOGRAM (N/A) 4 Days Post-Op  Precautions: Fall, Skin, Sternal      ASSESSMENT:  Based on the objective data described below, the patient has met all mobility goals. He is ambulating independently without AD and steady gait. No difficulty with stairs. He is able to maintain sternal precautions. Will discharge from caseload. PLAN:  Maximum therapeutic gains met at current level of care and patient will be discharged from physical therapy at this time.   Rationale for discharge:  [x]     Goals Achieved  []     Plateau Reached  []     Patient not participating in therapy  []     Other:    Further Equipment Recommendations for Discharge:  N/A    [facility]: At this time and based on an AM-PAC score of 24/24 (or **/20 if omitting stairs), no further PT is recommended upon discharge due to (i.e. patient at baseline functional statusetc). Recommend patient returns to prior setting with prior services. This AMPAC score should be considered in conjunction with interdisciplinary team recommendations to determine the most appropriate discharge setting. Patient's social support, diagnosis, medical stability, and prior level of function should also be taken into consideration. SUBJECTIVE:   Patient stated Im going home tomorrow.     OBJECTIVE DATA SUMMARY:   Critical Behavior:  Neurologic State: Alert  Orientation Level: Oriented X4  Cognition: Appropriate decision making, Appropriate for age attention/concentration, Appropriate safety awareness, Follows commands  Safety/Judgement: Fall prevention  Functional Mobility Training:        Transfers:  Sit to Stand: Modified independent  Stand to Sit: Modified independent                Balance:  Sitting: Intact  Standing: Intact  Standing - Static: Good                Ambulation/Gait Training:  Distance (ft): 250 Feet (ft)  Assistive Device: Walker, rollator  Ambulation - Level of Assistance: Modified independent            Stairs:  Number of Stairs Trained: 8  Stairs - Level of Assistance: Modified independent  Rail Use: Left         Pain:  Pain level pre-treatment: 2/10   Pain level post-treatment: 2/10   Pain Intervention(s): Medication (see MAR); Rest, Ice, Repositioning   Response to intervention: Nurse notified, See doc flow    Activity Tolerance:   Good  Please refer to the flowsheet for vital signs taken during this treatment. After treatment:   [x] Patient left in no apparent distress sitting up in chair  [] Patient left in no apparent distress in bed  [x] Call bell left within reach  [x] Nursing notified  [] Caregiver present  [] Bed alarm activated  [] SCDs applied      COMMUNICATION/EDUCATION:   []         Role of Physical Therapy in the acute care setting. []         Fall prevention education was provided and the patient/caregiver indicated understanding.   [x] Patient/family have participated as able and agree with findings and recommendations. []         Patient is unable to participate in plan of care at this time. []         Other:        Sami Em, PT   Time Calculation: 12 mins    Joel Proctor AM-PAC® Basic Mobility Inpatient Short Form (6-Clicks) Version 2    How much HELP from another person does the patient currently need    (If the patient hasn't done an activity recently, how much help from another person do you think he/she would need if he/she tried?)   Total (Total A or Dep)   A Lot  (Mod to Max A)   A Little (Sup or Min A)   None (Mod I to I)   Turning from your back to your side while in a flat bed without using bedrails? [] 1 [] 2 [] 3 [x] 4   2. Moving from lying on your back to sitting on the side of a flat bed without using bedrails? [] 1 [] 2 [] 3 [x] 4   3. Moving to and from a bed to a chair (including a wheelchair)? [] 1 [] 2 [] 3 [x] 4   4. Standing up from a chair using your arms (e.g., wheelchair, or bedside chair)? [] 1 [] 2 [] 3 [x] 4   5. Walking in hospital room? [] 1 [] 2 [] 3 [x] 4   6. Climbing 3-5 steps with a railing?+   [] 1 [] 2 [] 3 [x] 4   +If stair climbing cannot be assessed, skip item #6. Sum responses from items 1-5.

## 2023-01-20 NOTE — PROGRESS NOTES
Cardiology Progress Note    Admit Date: 1/15/2023  Attending Cardiologist: Dr. Noni Tran     Assessment:     -CAD, s/p CABGx3 1/16/23, LIMA-LAD, SVG-Ramus intermedius, SVG-posterior descending branch of RCA. -Inferior STEMI 1/15/23 [facility]). Presented with CP, underwent emergent LHC with RCA balloon angioplasty. LHC findings as noted below. Transferred to [facility] for CABG.   100% occluded proximal/mid very calcified large RCA. Baseline CAROLINE 0 flow post angioplasty CAROLINE-3 flow. Due to severe calcification stent is unable to cross the lesion. Moderate-sized ramus intermedius have a 80% calcified ostial/proximal disease. Large sized mid/distal LAD have 80% disease. Large left main artery have ostial 40% to 50% disease in cranial views with nonischemic IFR of 0.96. LVEDP 16 mmHg. -Transient atrial fibrillation: Postoperatively on day 1. -HTN   -DM2   -Active tobacco abuse. -Alcohol abuse. Primary cardiologist is Dr. Sue Troncoso:       I saw, evaluated, interviewed and examined the patient personally. Patient in good spirit. No chest pain. Walking in the hallway  No fluid overload on exam  Currently on aspirin, Plavix, statin and beta-blocker  Plan for discharge noted for tomorrow  Will be available as needed. Once discharged, he will need to see primary cardiologist in [facility] with Dr. Samuel Joya at [address] [place] [address], MD       POD #4, Continue routine post operative care per CTS team.   Rhythm stable, remains in sinus. Continued on BB, ASA, Plavix, statin. Tentative plans for discharge tomorrow. Subjective:     No new complaints.      Objective:      Patient Vitals for the past 8 hrs:   Temp Pulse Resp BP SpO2   01/20/23 0600 -- 76 20 (!) 132/102 98 %   01/20/23 0500 -- 74 17 (!) 146/81 100 %   01/20/23 0400 98.7 °F (37.1 °C) 73 14 125/73 96 %   01/20/23 0300 -- 71 15 111/71 99 %   01/20/23 0200 -- 71 16 115/75 97 %   01/20/23 0100 -- 79 22 123/78 99 %   01/20/23 0000 99.1 °F (37.3 °C) 87 17 (!) 149/91 94 %           Patient Vitals for the past 96 hrs:   Weight   01/20/23 0037 83.4 kg (183 lb 12.8 oz)   01/19/23 0500 83.6 kg (184 lb 6.4 oz)   01/18/23 0519 84.2 kg (185 lb 11.2 oz)   01/17/23 0530 81.8 kg (180 lb 6.4 oz)         TELE: SR               Current Facility-Administered Medications   Medication Dose Route Frequency Last Admin    furosemide (LASIX) injection 20 mg  20 mg IntraVENous ONCE      potassium chloride (K-DUR, KLOR-CON M20) SR tablet 20 mEq  20 mEq Oral NOW      amiodarone (CORDARONE) tablet 200 mg  200 mg Oral BID      bisacodyL (DULCOLAX) suppository 10 mg  10 mg Rectal DAILY PRN      bisacodyL (DULCOLAX) tablet 10 mg  10 mg Oral DAILY      metoprolol tartrate (LOPRESSOR) tablet 25 mg  25 mg Oral BID 25 mg at 01/19/23 1701    pantoprazole (PROTONIX) tablet 40 mg  40 mg Oral DAILY 40 mg at 01/19/23 0816    insulin lispro (HUMALOG) injection   SubCUTAneous AC&HS      glucose chewable tablet 16 g  4 Tablet Oral PRN      glucagon (GLUCAGEN) injection 1 mg  1 mg IntraMUSCular PRN      cyanocobalamin tablet 1,000 mcg  1,000 mcg Oral DAILY 1,000 mcg at 01/19/23 0816    iron sucrose (VENOFER) injection 200 mg  200 mg IntraVENous Q24H 200 mg at 01/19/23 0818    clopidogreL (PLAVIX) tablet 75 mg  75 mg Oral DAILY 75 mg at 01/19/23 0817    HYDROcodone-acetaminophen (NORCO) 5-325 mg per tablet 1-2 Tablet  1-2 Tablet Oral Q6H PRN 1 Tablet at 01/20/23 0037    bisacodyL (DULCOLAX) suppository 10 mg  10 mg Rectal DAILY 10 mg at 01/17/23 1657    tamsulosin (FLOMAX) capsule 0.4 mg  0.4 mg Oral DAILY 0.4 mg at 01/19/23 0815    albumin human 5% (BUMINATE) solution 12.5 g  12.5 g IntraVENous Q20MIN PRN 12.5 g at 01/16/23 0576    sodium chloride (NS) flush 5-40 mL  5-40 mL IntraVENous Q8H 10 mL at 01/19/23 2200    sodium chloride (NS) flush 5-40 mL  5-40 mL IntraVENous PRN      acetaminophen (TYLENOL) tablet 650 mg  650 mg Oral Q4H PRN      morphine injection 2-4 mg  2-4 mg IntraVENous Q4H PRN 4 mg at 01/18/23 2350    naloxone (NARCAN) injection 0.4 mg  0.4 mg IntraVENous PRN      mupirocin (BACTROBAN) 2 % ointment   Both Nostrils BID Given at 01/19/23 1703    albuterol (PROVENTIL VENTOLIN) nebulizer solution 2.5 mg  2.5 mg Nebulization Q4H PRN      ondansetron (ZOFRAN) injection 4 mg  4 mg IntraVENous Q4H PRN      aspirin delayed-release tablet 81 mg  81 mg Oral DAILY 81 mg at 01/19/23 0815    chlorhexidine (PERIDEX) 0.12 % mouthwash 10 mL  10 mL Oral BID 10 mL at 01/19/23 1702    docusate sodium (COLACE) capsule 100 mg  100 mg Oral  mg at 01/19/23 1701    ELECTROLYTE REPLACEMENT PROTOCOL - Potassium Standard Dosing  1 Each Other PRN      ELECTROLYTE REPLACEMENT PROTOCOL - Magnesium  1 Each Other PRN      heparin (porcine) injection 5,000 Units  5,000 Units SubCUTAneous Q8H 5,000 Units at 01/19/23 2201    dextrose 10% infusion 0-250 mL  0-250 mL IntraVENous PRN      polyethylene glycol (MIRALAX) packet 17 g  17 g Oral DAILY 17 g at 01/19/23 0813    atorvastatin (LIPITOR) tablet 80 mg  80 mg Oral DAILY 80 mg at 26/62/67 6381    folic acid (FOLVITE) tablet 1 mg  1 mg Oral DAILY 1 mg at 01/19/23 0818    thiamine HCL (B-1) tablet 100 mg  100 mg Oral DAILY 100 mg at 01/19/23 0815    therapeutic multivitamin [facility]) tablet 1 Tablet  1 Tablet Oral DAILY 1 Tablet at 01/19/23 0817         Intake/Output Summary (Last 24 hours) at 1/20/2023 0759  Last data filed at 1/20/2023 5067  Gross per 24 hour   Intake 1350 ml   Output 2980 ml   Net -1630 ml         Physical Exam:  General:  alert, cooperative, no distress, appears stated age  Neck:  no JVD  Lungs:  diminished B/L   Heart:  RRR  Abdomen:  abdomen is soft without significant tenderness, masses, organomegaly or guarding  Extremities:  extremities normal, atraumatic, no cyanosis or edema    Visit Vitals  BP (!) 132/102   Pulse 76   Temp 98.7 °F (37.1 °C)   Resp 20   Ht 6' 1\" (1.854 m)   Wt 83.4 kg (183 lb 12.8 oz)   SpO2 98%   BMI 24.25 kg/m²       Data Review:     Labs: Results:       Chemistry Recent Labs     01/19/23  0530 01/18/23  0510 01/17/23  1543   GLU 88 127* 113*   * 132* 134*   K 3.9 3.7 3.8    102 104   CO2 23 22 25   BUN 9 7 8   CREA 0.54* 0.65 0.58*   CA 9.1 9.1 8.6   AGAP 6 8 5   BUCR 17 11* 14        CBC w/Diff Recent Labs     01/20/23  0456 01/19/23  0530 01/18/23  0510   WBC 6.2 7.0 8.2   RBC 2.58* 2.63* 2.89*   HGB 8.6* 8.6* 9.6*   HCT 23.7* 24.0* 26.3*    144 140   GRANS 60 68 76*   LYMPH 25 20* 14*   EOS 2 1 1        Cardiac Enzymes No results found for: CPK, CK, CKMMB, CKMB, RCK3, CKMBT, CKNDX, CKND1, YARELIS, TROPT, TROIQ, ARIELA, TROPT, TNIPOC, BNP, BNPP   Coagulation No results for input(s): PTP, INR, APTT, INREXT, INREXT in the last 72 hours. Lipid Panel Lab Results   Component Value Date/Time    Cholesterol, total 140 03/01/2014 04:28 AM    HDL Cholesterol 27 (L) 03/01/2014 04:28 AM    LDL, calculated 74.2 03/01/2014 04:28 AM    VLDL, calculated 38.8 03/01/2014 04:28 AM    Triglyceride 194 (H) 03/01/2014 04:28 AM    CHOL/HDL Ratio 5.2 (H) 03/01/2014 04:28 AM      BNP No results found for: BNP, BNPP, XBNPT   Liver Enzymes No results for input(s): TP, ALB, TBIL, AP in the last 72 hours.     No lab exists for component: SGOT, GPT, DBIL     Thyroid Studies No results found for: T4, T3U, TSH, TSHEXT, TSHEXT

## 2023-01-20 NOTE — PROGRESS NOTES
CARDIAC SURGERY PROGRESS NOTE    2023     Post Operative Day # 4     Chart reviewed. Interval History/Events of Past 24 hours:     Assessment:  CAD S/P  CABG x 3 - on BB, ASA, statin, plavix  Respiratory parameters stable  Cardiac status stable     Plans:  Lasix/k  Increase cathartics  Amio decreased    Heart Hugger use encouraged to mitigate pain and will also assist with deep breathing and incentive spirometry goals. Possible discharge 1 days. Xochitl Benz PA-C  [facility]  [phone]  _____________________________________________________________________________________________________________________________________________  Subjective:  Patient seen and examined on rounds today. BM: No    Objective:  Vital signs:   Visit Vitals  BP (!) 132/102   Pulse 76   Temp 98.7 °F (37.1 °C)   Resp 20   Ht 6' 1\" (1.854 m)   Wt 83.4 kg (183 lb 12.8 oz)   SpO2 98%   BMI 24.25 kg/m²     Temp (24hrs), Av °F (37.2 °C), Min:98.6 °F (37 °C), Max:99.4 °F (37.4 °C)    Admission Weight: Last Weight   Weight: 81.8 kg (180 lb 6.4 oz) Weight: 83.4 kg (183 lb 12.8 oz)     Last 3 Recorded Weights in this Encounter    23 0519 23 0500 23 0037   Weight: 84.2 kg (185 lb 11.2 oz) 83.6 kg (184 lb 6.4 oz) 83.4 kg (183 lb 12.8 oz)       Telemetry: nsr    Physical Examination:     General:  Alert, oriented  Lungs: Clear to ascultation without rales, wheezes or rhonchi. Chest: Dressings clean and dry. Midline incision healing well. Sternum stable. Heart: regular rate and rhythm, No murmur. Abdomen: Soft and non-tender without masses. Bowel sounds present. Extremities: Warm and well perfused. Edema no. Incisions: clean and dry  Neuro: No deficit.         SUP Prophylaxis: Pepcid  DVT Prophylaxis:   pneumatic compression boots: Yes  Compression stockings:  Yes  DVT ppx: Yes SC Heparin    DME needs:  t          Labs:  Lab Results   Component Value Date/Time    WBC 6.2 01/20/2023 04:56 AM    HCT 23.7 (L) 01/20/2023 04:56 AM     01/20/2023 04:56 AM               PLEASE NOTE:  This document may have been produced using voice recognition software. Unrecognized errors in transcription may be present. Please call with any questions. NOTE TO PATIENT:  The purpose of this note is to communicate optimally with the other providers involved in your care. It is written using standard medical terminology. If you have questions regarding details of the note please call my office at [phone] and make an appointment to discuss your concerns.

## 2023-01-20 NOTE — PROGRESS NOTES
Problem: Self Care Deficits Care Plan (Adult)  Goal: *Acute Goals and Plan of Care (Insert Text)  Description: Occupational Therapy Goals  Initiated 1/17/2023 within 7 day(s). 1.  Patient will perform grooming at sink in stance with G balance x 2-3 min with supervision/set-up. 2.  Patient will perform upper body dressing with modified independence. 3.  Patient will perform lower body dressing with supervision/set-up. 4.  Patient will perform toilet transfers with supervision/set-up. 5.  Patient will perform all aspects of toileting with supervision/set-up. 6.  Patient will utilize energy conservation techniques and sternal precautions during functional activities with verbal cues. Prior Level of Function:Pt reports independence at home      Outcome: Progressing Towards Goal   OCCUPATIONAL THERAPY TREATMENT    Patient: Blake Alcala (80 y.o. male)  Date: 1/20/2023  Diagnosis: Acute ST elevation myocardial infarction (STEMI) involving other coronary artery of inferior wall (HCC) [I21.19]  STEMI (ST elevation myocardial infarction) ([facility] [address] [I21.3] <principal problem not specified>  Procedure(s) (LRB):  CORONARY ARTERY BYPASS GRAFT (CABG) TIMES THREE (N/A)  ESOPHAGEAL TRANS ECHOCARDIOGRAM (N/A) 4 Days Post-Op  Precautions: Fall, Skin, Sternal  PLOF: Pt reports independence at home      Chart, occupational therapy assessment, plan of care, and goals were reviewed. ASSESSMENT:  Pt cleared by RN for OT tx at this time. Pt presented sitting up in chair upon arrival and agreeable for participation. Pt educated on mult energy conservation techniques including pacing and deep breathing to prevent SOB and fatigue, to increase activity tolerance and safety w/ADLs and functional mobility, pt verbalized understanding. Also reviewed sternal precautions requiring MIN cueing throughout session for adherence. Practiced LB dressing utilizing AE continuing to require SBA @ chair level.  Pt was assisted donning heart hugger with MIN A @ chair level. STS transfer CGA with Rollator x 2 trials to emphasize utilizing heart hugger in prep for ADL tasks. Pt was left in chair with all needs left within reach. RN made aware. Progression toward goals:  []          Improving appropriately and progressing toward goals  [x]          Improving slowly and progressing toward goals  []          Not making progress toward goals and plan of care will be adjusted     PLAN:  Patient continues to benefit from skilled intervention to address the above impairments. Continue treatment per established plan of care. Further Equipment Recommendations for Discharge:  Shower chair     AMPAC: Current research shows that an AM-PAC score of 18 or greater is associated with a discharge to the patient's home setting. Based on an AM-PAC score of 19/24 and their current ADL deficits; it is recommended that the patient have 2-3 sessions per week of Occupational Therapy at d/c to increase the patient's independence. This AMPAC score should be considered in conjunction with interdisciplinary team recommendations to determine the most appropriate discharge setting. Patient's social support, diagnosis, medical stability, and prior level of function should also be taken into consideration. SUBJECTIVE:   Patient stated I am doing real good.     OBJECTIVE DATA SUMMARY:   Cognitive/Behavioral Status:  Neurologic State: Alert  Orientation Level: Oriented X4  Cognition: Appropriate decision making, Appropriate for age attention/concentration, Appropriate safety awareness, Follows commands  Safety/Judgement: Fall prevention    Functional Mobility and Transfers for ADLs:      Transfers:  Sit to Stand: Contact guard assistance  Stand to Sit: Stand-by assistance          Balance:  Sitting: Intact  Standing: Impaired; With support  Standing - Static: Good    ADL Intervention:       Upper Body Dressing Assistance  Orthotics(Brace):  Minimum assistance (donning heart jerry)    Lower Body Dressing Assistance  Socks: Stand-by assistance  Leg Crossed Method Used: No  Position Performed: Seated in chair  Cues: Visual cues provided;Verbal cues provided  Adaptive Equipment Used: Sock aid;Reacher         Pain:  Pain level pre-treatment: 0/10   Pain level post-treatment: 0/10      Activity Tolerance:    Fair   Please refer to the flowsheet for vital signs taken during this treatment. After treatment:   [x]  Patient left in no apparent distress sitting up in chair  []  Patient left in no apparent distress in bed  [x]  Call bell left within reach  [x]  Nursing notified  []  Caregiver present  []  Bed alarm activated    COMMUNICATION/EDUCATION:   [x] Role of Occupational Therapy in the acute care setting  [x] Home safety education was provided and the patient/caregiver indicated understanding. [x] Patient/family have participated as able in working towards goals and plan of care. [x] Patient/family agree to work toward stated goals and plan of care. [] Patient understands intent and goals of therapy, but is neutral about his/her participation. [] Patient is unable to participate in goal setting and plan of care. Thank you for this referral.  ELIJAH Hensley  Time Calculation: 24 mins    [employer] AM-PAC® Daily Activity Inpatient Short Form (6-Clicks)*    How much HELP from another person does the patient currently need    (If the patient hasn't done an activity recently, how much help from another person do you think he/she would need if he/she tried?)   Total (Total A or Dep)   A Lot  (Mod to Max A)   A Little (Sup or Min A)   None (Mod I to I)   Putting on and taking off regular lower body clothing? [] 1 [] 2 [x] 3 [] 4   2. Bathing (including washing, rinsing,      drying)? [] 1 [] 2 [x] 3 [] 4   3. Toileting, which includes using toilet, bedpan or urinal?   [] 1 [] 2 [x] 3 [] 4   4. Putting on and taking off regular upper body clothing?    [] 1 [] 2 [x] 3 [] 4   5. Taking care of personal grooming such as brushing teeth? [] 1 [] 2 [x] 3 [] 4   6. Eating meals?    [] 1 [] 2 [] 3 [x] 4

## 2023-01-20 NOTE — PROGRESS NOTES
Problem: Falls - Risk of  Goal: *Absence of Falls  Description: Document Rachel San Fall Risk and appropriate interventions in the flowsheet.   Outcome: Progressing Towards Goal  Note: Fall Risk Interventions:  Mobility Interventions: Bed/chair exit alarm, Patient to call before getting OOB         Medication Interventions: Teach patient to arise slowly, Patient to call before getting OOB    Elimination Interventions: Call light in reach, Urinal in reach              Problem: Ventilator Management  Goal: *Adequate oxygenation and ventilation  Outcome: Resolved/Met  Goal: *Patient maintains clear airway/free of aspiration  Outcome: Resolved/Met  Goal: *Absence of infection signs and symptoms  Outcome: Resolved/Met  Goal: *Normal spontaneous ventilation  Outcome: Resolved/Met

## 2023-01-20 NOTE — PROGRESS NOTES
Updated clinicals and home health order has been faxed to the [place] office at the [place].               SOFY BarrettN RN  Care Management  Pager: [phone]

## 2023-01-21 ENCOUNTER — TELEPHONE (OUTPATIENT)
Dept: CARDIOTHORACIC SURGERY | Age: 63
End: 2023-01-21

## 2023-01-21 ENCOUNTER — APPOINTMENT (OUTPATIENT)
Dept: GENERAL RADIOLOGY | Age: 63
DRG: 235 | End: 2023-01-21
Attending: PHYSICIAN ASSISTANT
Payer: OTHER GOVERNMENT

## 2023-01-21 VITALS
OXYGEN SATURATION: 97 % | TEMPERATURE: 98.8 F | WEIGHT: 182.4 LBS | SYSTOLIC BLOOD PRESSURE: 118 MMHG | BODY MASS INDEX: 24.18 KG/M2 | RESPIRATION RATE: 21 BRPM | HEIGHT: 73 IN | HEART RATE: 83 BPM | DIASTOLIC BLOOD PRESSURE: 82 MMHG

## 2023-01-21 PROBLEM — H25.12 AGE-RELATED NUCLEAR CATARACT, LEFT EYE: Status: ACTIVE | Noted: 2023-01-21

## 2023-01-21 PROBLEM — F10.988: Status: ACTIVE | Noted: 2023-01-21

## 2023-01-21 PROBLEM — Z95.1 S/P CABG X 3: Status: ACTIVE | Noted: 2023-01-16

## 2023-01-21 PROBLEM — M72.2 PLANTAR FASCIAL FIBROMATOSIS OF LEFT FOOT: Status: ACTIVE | Noted: 2023-01-21

## 2023-01-21 PROBLEM — H40.009 GLAUCOMA SUSPECT: Status: ACTIVE | Noted: 2023-01-21

## 2023-01-21 PROBLEM — K21.9 GASTROESOPHAGEAL REFLUX DISEASE: Status: ACTIVE | Noted: 2023-01-21

## 2023-01-21 PROBLEM — K57.30 DIVERTICULOSIS OF COLON: Status: ACTIVE | Noted: 2023-01-21

## 2023-01-21 PROBLEM — M54.50 CHRONIC LOW BACK PAIN: Status: ACTIVE | Noted: 2023-01-21

## 2023-01-21 PROBLEM — N52.9 MALE ERECTILE DYSFUNCTION, UNSPECIFIED: Status: ACTIVE | Noted: 2023-01-21

## 2023-01-21 PROBLEM — G90.09 OTHER IDIOPATHIC PERIPHERAL AUTONOMIC NEUROPATHY: Status: ACTIVE | Noted: 2023-01-21

## 2023-01-21 PROBLEM — E55.9 VITAMIN D DEFICIENCY: Status: ACTIVE | Noted: 2023-01-21

## 2023-01-21 PROBLEM — G89.29 CHRONIC LOW BACK PAIN: Status: ACTIVE | Noted: 2023-01-21

## 2023-01-21 PROBLEM — T46.4X5A ADVERSE EFFECT OF ANGIOTENSIN-CONVERTING ENZYME INHIBITOR: Status: ACTIVE | Noted: 2023-01-21

## 2023-01-21 PROBLEM — G62.9 PERIPHERAL NEUROPATHY: Status: ACTIVE | Noted: 2023-01-21

## 2023-01-21 LAB
ANION GAP SERPL CALC-SCNC: 8 MMOL/L (ref 3–18)
BASOPHILS # BLD: 0 K/UL (ref 0–0.1)
BASOPHILS NFR BLD: 0 % (ref 0–2)
BUN SERPL-MCNC: 10 MG/DL (ref 7–18)
BUN/CREAT SERPL: 17 (ref 12–20)
CALCIUM SERPL-MCNC: 8.6 MG/DL (ref 8.5–10.1)
CHLORIDE SERPL-SCNC: 102 MMOL/L (ref 100–111)
CO2 SERPL-SCNC: 23 MMOL/L (ref 21–32)
CREAT SERPL-MCNC: 0.59 MG/DL (ref 0.6–1.3)
DIFFERENTIAL METHOD BLD: ABNORMAL
EOSINOPHIL # BLD: 0.1 K/UL (ref 0–0.4)
EOSINOPHIL NFR BLD: 2 % (ref 0–5)
ERYTHROCYTE [DISTWIDTH] IN BLOOD BY AUTOMATED COUNT: 12.6 % (ref 11.6–14.5)
GLUCOSE BLD STRIP.AUTO-MCNC: 110 MG/DL (ref 70–110)
GLUCOSE BLD STRIP.AUTO-MCNC: 96 MG/DL (ref 70–110)
GLUCOSE SERPL-MCNC: 65 MG/DL (ref 74–99)
HCT VFR BLD AUTO: 24 % (ref 36–48)
HGB BLD-MCNC: 8.7 G/DL (ref 13–16)
IMM GRANULOCYTES # BLD AUTO: 0.1 K/UL (ref 0–0.04)
IMM GRANULOCYTES NFR BLD AUTO: 1 % (ref 0–0.5)
LYMPHOCYTES # BLD: 1.1 K/UL (ref 0.9–3.6)
LYMPHOCYTES NFR BLD: 20 % (ref 21–52)
MCH RBC QN AUTO: 34.1 PG (ref 24–34)
MCHC RBC AUTO-ENTMCNC: 36.3 G/DL (ref 31–37)
MCV RBC AUTO: 94.1 FL (ref 78–100)
MONOCYTES # BLD: 0.9 K/UL (ref 0.05–1.2)
MONOCYTES NFR BLD: 17 % (ref 3–10)
NEUTS SEG # BLD: 3.2 K/UL (ref 1.8–8)
NEUTS SEG NFR BLD: 59 % (ref 40–73)
NRBC # BLD: 0 K/UL (ref 0–0.01)
NRBC BLD-RTO: 0 PER 100 WBC
PLATELET # BLD AUTO: 190 K/UL (ref 135–420)
PMV BLD AUTO: 9.3 FL (ref 9.2–11.8)
POTASSIUM SERPL-SCNC: 3.7 MMOL/L (ref 3.5–5.5)
RBC # BLD AUTO: 2.55 M/UL (ref 4.35–5.65)
SODIUM SERPL-SCNC: 133 MMOL/L (ref 136–145)
WBC # BLD AUTO: 5.5 K/UL (ref 4.6–13.2)

## 2023-01-21 PROCEDURE — 85025 COMPLETE CBC W/AUTO DIFF WBC: CPT

## 2023-01-21 PROCEDURE — APPNB45 APP NON BILLABLE 31-45 MINUTES: Performed by: PHYSICIAN ASSISTANT

## 2023-01-21 PROCEDURE — 82962 GLUCOSE BLOOD TEST: CPT

## 2023-01-21 PROCEDURE — 99024 POSTOP FOLLOW-UP VISIT: CPT | Performed by: PHYSICIAN ASSISTANT

## 2023-01-21 PROCEDURE — 80048 BASIC METABOLIC PNL TOTAL CA: CPT

## 2023-01-21 PROCEDURE — 2709999900 HC NON-CHARGEABLE SUPPLY

## 2023-01-21 PROCEDURE — 94762 N-INVAS EAR/PLS OXIMTRY CONT: CPT

## 2023-01-21 PROCEDURE — 74011250637 HC RX REV CODE- 250/637: Performed by: PHYSICIAN ASSISTANT

## 2023-01-21 PROCEDURE — 74011000250 HC RX REV CODE- 250: Performed by: PHYSICIAN ASSISTANT

## 2023-01-21 PROCEDURE — 74011250637 HC RX REV CODE- 250/637: Performed by: THORACIC SURGERY (CARDIOTHORACIC VASCULAR SURGERY)

## 2023-01-21 PROCEDURE — 71045 X-RAY EXAM CHEST 1 VIEW: CPT

## 2023-01-21 PROCEDURE — 74011250636 HC RX REV CODE- 250/636: Performed by: PHYSICIAN ASSISTANT

## 2023-01-21 RX ORDER — POTASSIUM CHLORIDE 20 MEQ/1
40 TABLET, EXTENDED RELEASE ORAL 2 TIMES DAILY
Qty: 30 TABLET | Refills: 0 | Status: SHIPPED | OUTPATIENT
Start: 2023-01-21 | End: 2023-01-28

## 2023-01-21 RX ORDER — POTASSIUM CHLORIDE 20 MEQ/1
40 TABLET, EXTENDED RELEASE ORAL ONCE
Status: COMPLETED | OUTPATIENT
Start: 2023-01-21 | End: 2023-01-21

## 2023-01-21 RX ORDER — POTASSIUM CHLORIDE 20 MEQ/1
20 TABLET, EXTENDED RELEASE ORAL ONCE
Status: DISCONTINUED | OUTPATIENT
Start: 2023-01-21 | End: 2023-01-21

## 2023-01-21 RX ORDER — METFORMIN HYDROCHLORIDE 500 MG/1
1000 TABLET ORAL 2 TIMES DAILY WITH MEALS
COMMUNITY

## 2023-01-21 RX ORDER — MELATONIN
50
COMMUNITY
Start: 2022-05-27

## 2023-01-21 RX ORDER — MULTIVITAMIN WITH IRON
1 TABLET ORAL DAILY
Qty: 30 TABLET | Refills: 2 | Status: SHIPPED | OUTPATIENT
Start: 2023-01-21 | End: 2023-01-22 | Stop reason: SDUPTHER

## 2023-01-21 RX ORDER — FUROSEMIDE 40 MG/1
40 TABLET ORAL DAILY
Qty: 30 TABLET | Refills: 0 | Status: SHIPPED | OUTPATIENT
Start: 2023-01-21 | End: 2023-01-28

## 2023-01-21 RX ORDER — FUROSEMIDE 10 MG/ML
20 INJECTION INTRAMUSCULAR; INTRAVENOUS ONCE
Status: COMPLETED | OUTPATIENT
Start: 2023-01-21 | End: 2023-01-21

## 2023-01-21 RX ORDER — METOPROLOL TARTRATE 50 MG/1
50 TABLET ORAL 2 TIMES DAILY
Qty: 60 TABLET | Refills: 2 | Status: SHIPPED | OUTPATIENT
Start: 2023-01-21 | End: 2023-01-22 | Stop reason: SDUPTHER

## 2023-01-21 RX ADMIN — DOCUSATE SODIUM 100 MG: 100 CAPSULE, LIQUID FILLED ORAL at 09:46

## 2023-01-21 RX ADMIN — AMIODARONE HYDROCHLORIDE 200 MG: 200 TABLET ORAL at 09:46

## 2023-01-21 RX ADMIN — PANTOPRAZOLE SODIUM 40 MG: 40 TABLET, DELAYED RELEASE ORAL at 09:46

## 2023-01-21 RX ADMIN — FOLIC ACID 1 MG: 1 TABLET ORAL at 10:05

## 2023-01-21 RX ADMIN — THERA TABS 1 TABLET: TAB at 09:46

## 2023-01-21 RX ADMIN — CYANOCOBALAMIN TAB 1000 MCG 1000 MCG: 1000 TAB at 09:46

## 2023-01-21 RX ADMIN — SODIUM CHLORIDE, PRESERVATIVE FREE 10 ML: 5 INJECTION INTRAVENOUS at 06:40

## 2023-01-21 RX ADMIN — Medication 100 MG: at 09:46

## 2023-01-21 RX ADMIN — POTASSIUM CHLORIDE 40 MEQ: 1500 TABLET, EXTENDED RELEASE ORAL at 10:53

## 2023-01-21 RX ADMIN — MUPIROCIN: 20 OINTMENT TOPICAL at 10:53

## 2023-01-21 RX ADMIN — TAMSULOSIN HYDROCHLORIDE 0.4 MG: 0.4 CAPSULE ORAL at 09:46

## 2023-01-21 RX ADMIN — FUROSEMIDE 20 MG: 10 INJECTION, SOLUTION INTRAMUSCULAR; INTRAVENOUS at 09:46

## 2023-01-21 RX ADMIN — METOPROLOL TARTRATE 25 MG: 25 TABLET, FILM COATED ORAL at 09:46

## 2023-01-21 RX ADMIN — POLYETHYLENE GLYCOL 3350 17 G: 17 POWDER, FOR SOLUTION ORAL at 09:45

## 2023-01-21 RX ADMIN — BISACODYL 10 MG: 5 TABLET, COATED ORAL at 09:46

## 2023-01-21 RX ADMIN — IRON SUCROSE 200 MG: 20 INJECTION, SOLUTION INTRAVENOUS at 08:26

## 2023-01-21 RX ADMIN — HEPARIN SODIUM 5000 UNITS: 5000 INJECTION INTRAVENOUS; SUBCUTANEOUS at 08:26

## 2023-01-21 RX ADMIN — CHLORHEXIDINE GLUCONATE 0.12% ORAL RINSE 10 ML: 1.2 LIQUID ORAL at 09:46

## 2023-01-21 RX ADMIN — ASPIRIN 81 MG: 81 TABLET, COATED ORAL at 09:45

## 2023-01-21 RX ADMIN — ATORVASTATIN CALCIUM 80 MG: 40 TABLET, FILM COATED ORAL at 09:45

## 2023-01-21 RX ADMIN — CLOPIDOGREL BISULFATE 75 MG: 75 TABLET ORAL at 09:46

## 2023-01-21 NOTE — TELEPHONE ENCOUNTER
Called wife after receiving call from ICU about medications and insurance. Reviewed medications. Outside pharmacy does not take [address] [facility] insurance. Prescriptions reprinted and given to office to mail to patient as patient lives in [place].     Gudelia Martínez PA-C  [facility]  [phone]

## 2023-01-21 NOTE — PROGRESS NOTES
07:15  Received pt up in chair from Emily Dyer RN. Pt A&O x 4  Denies pain. VS:149/88, HR 81 bpm sinus rhythm with occasional PVCs, RR 17, Sp02 100% room air. No IVF or drips infusing. No chest tubes or pacing wires. Wound Prevention Checklist    Patient: Peter Puentes (1 y.o. male)  Date: 1/21/2023  Diagnosis: Acute ST elevation myocardial infarction (STEMI) involving other coronary artery of inferior wall (HCC) [I21.19]  STEMI (ST elevation myocardial infarction) ([facility] [address] [I21.3] <principal problem not specified>    Precautions: Fall, Skin, Sternal       []  Heel prevention boots placed on patient    []  Patient turned q2h during shift    []  Lift team ordered    []  [facility]    []  Each Wound is documented during shift (Stage, Color, drainage, odor, measurements, and dressings)    [x]  Dual skin check done with WILBERT Gamez RN   08:00  Dr. Malou Dominique and NINO FREEMAN at bedside. 09:46  20 mg lasix iv administered. 10:53  40 mEq potassium chloride po administered for K+ 3.7  12:00  Cordis and triple lumen CVL removed. Pt on bedrest for next hour. Red CVT surgical bracelet placed on left wrist.  Wife expected at 13:00  13:40  Pt, wife and daughter viewed discharge video. Time for questions given. 15:48  Discharge instructions given and discussed with pt, pt's wife and pt's daughter. Discussed new medications and what pharmacy to obtain them. Discussed continued medications from home and discontinued medications from home. Informational sheets printed for all new meds. Discussed physical expectations, (walking encouraged) and limitations, (no pushing, pulling or lifting greater than 5 lbs) and continued use of heart hugger. Discussed incisional care and OK to shower day after tomorrow.   Discussed contacting Dr. Danette Mcdonald office for any and all questions with phone number on red bracelet placed on pt's left wrist.  Hand-outs for diabetic information and heart healthy diet included indischarge instructions. Time left for questions and discussion. Pt left unit via wheel chair and accompanied by RN, Wife and daughter. Pt assisted into private car driven by daughter.

## 2023-01-21 NOTE — DISCHARGE INSTRUCTIONS
Please CALL Cardiac Surgery office with any questions or concerns [phone]. - Take all medications as directed. - Wear Heart Hugger daily.   - Shower daily. Wash incisions with soap and water and pat dry. Keep lower leg incision dressed while draining.   - No heavy lifting.   - No driving until cleared by CT Surgery and while taking pain medications. - Follow up in Cardiac Surgery clinic in 1 week and 3 weeks. - Follow up with PCP in one week and Cardiology in 3 weeks.

## 2023-01-21 NOTE — PROGRESS NOTES
Discharge/Transition Planning     Sent dc summary to [place] social work. CM sent home health orders to [place] yesterday.    [facility] must [facility] and [place] PCP sign off on it

## 2023-01-21 NOTE — PROGRESS NOTES
1915: Bedside shift change report given to this nurse (oncoming nurse) by BARRY Marinelli RN (offgoing nurse). Report included the following information SBAR, Kardex, and Cardiac Rhythm NSR with PVC's . Wound Prevention Checklist    Patient: Shama Rojas (44 y.o. male)  Date: 1/20/2023  Diagnosis: Acute ST elevation myocardial infarction (STEMI) involving other coronary artery of inferior wall (HCC) [I21.19]  STEMI (ST elevation myocardial infarction) ([facility] [address] [I21.3] <principal problem not specified>    Precautions: Fall, Skin, Sternal       []  Heel prevention boots placed on patient    [x]  Patient turned q2h during shift    []  Lift team ordered    [x]  [facility]    [x]  Each Wound is documented during shift (Stage, Color, drainage, odor, measurements, and dressings)    [x]  Dual skin check done with Jaci Grijalva RN  Patient sitting up in chair, family at bedside. NSR with PVC's on monitor. O2 sat in high 90's on RA. No distress noted at this time. 2230: Patient sleeping in  chair vs bed per patient preference. Patient stood and repositioned every 2 hours while awake using heart hugger and assistance of this nurse. No distress noted. 2715: Report given to WILBERT Alvarez

## 2023-01-21 NOTE — DISCHARGE SUMMARY
CARDIAC SURGERY DISCHARGE SUMMARY    1/21/2023  9:11 AM    CHIEF COMPLAINT: STEMI      HISTORY OF PRESENT ILLNESS:  Omar Mendoza is a 60 y.o. male patient of Dr. Randolph Pérez who presented with STEMI class Echocardiography revealed an ejection fraction of 50-55%. Cardiac catheterization showed  with 3-vessel CAD. He was admitted for surgical coronary revascularization. PAST MEDICAL HISTORY:   Past Medical History:   Diagnosis Date    Anginal pain ([facility] [address]     Diabetes ([facility] [address]     new onset 2/2014    Essential hypertension, benign 2/28/2014    High cholesterol     Hyperosmolar (nonketotic) coma ([facility] [address] 2/27/2014    Hypertension     Hypokalemia 2/28/2014    Other idiopathic peripheral autonomic neuropathy 1/21/2023    Peripheral neuropathy 1/21/2023    Plantar fascial fibromatosis of left foot 1/21/2023    S/P CABG x 3 1/16/2023    CABG x3 - LIMA-LAD, rSVG-ramus, rSVG-PDA -Dr. Jadyn Johnson, 1/16/23    STEMI (ST elevation myocardial infarction) ([facility] [address] 1/15/2023    Added automatically from request for surgery [id number]    Type II or unspecified type diabetes mellitus without mention of complication, uncontrolled 2/28/2014   . PAST SURGICAL HISTORY: History reviewed. No pertinent surgical history. [facility] COURSE:  He was admitted to the hospital and underwent CABG x 3  on 1/16/23 with Dr. Jadyn Johnson. He was extubated on the first night following surgery and was eventually up and ambulating well and taking a diet well. Wires and tubes were removed. Notable postoperative events included anemia post op expected. Procedures:   Procedure(s):  CORONARY ARTERY BYPASS GRAFT (CABG) TIMES THREE  ESOPHAGEAL TRANS ECHOCARDIOGRAM      Consultants: Cardiology    He is to be discharged to  today. At the time of discharge, his lungs were clear to auscultation bilaterally and the heart had a regular rate and rhythm. The sternum was stable and all wounds were well healed with no evidence of infection. There was mild pedal edema.  Room air oximetry was 97%. LABS:  Lab Results   Component Value Date/Time    WBC 5.5 01/21/2023 04:35 AM    HCT 24.0 (L) 01/21/2023 04:35 AM     01/21/2023 04:35 AM      Lab Results   Component Value Date/Time     (L) 01/21/2023 04:35 AM    K 3.7 01/21/2023 04:35 AM     01/21/2023 04:35 AM    CO2 23 01/21/2023 04:35 AM    GLU 65 (L) 01/21/2023 04:35 AM    BUN 10 01/21/2023 04:35 AM    CREA 0.59 (L) 01/21/2023 04:35 AM    CREA 0.55 (L) 01/20/2023 04:56 AM    CREA 0.54 (L) 01/19/2023 05:30 AM       Imaging:y     CT CHEST ABD PELV WO CONT    Result Date: 1/15/2023  EXAM: CT of the Chest, Abdomen and Pelvis without contrast INDICATION:  assess for asc Ao dilation and Ca+ . Incidental hypertension. Extensive coronary disease recommended for coronary bypass grafting. TECHNIQUE: CT of the chest, abdomen and pelvis without contrast. Sagittal and coronal reformations obtained. All CT scans at this facility are performed using dose optimization technique as appropriate to a performed exam, to include automated exposure control, adjustment of the mA and/or kV according to patient size (including appropriate matching for site specific examination) or use of iterative reconstruction technique. IV contrast: None administered. Some contrast is noted in the collecting system from recent coronary angiogram. Enteric contrast: None COMPARISON: None. FINDINGS: Limitations: Evaluation of the solid organs and pulmonary arteries are limited due to the lack of IV contrast. Thyroid: Unremarkable. Mediastinum: No mediastinal adenopathy. No central fluid collection. No mediastinal mass. Heart: Unremarkable. Pericardium: Unremarkable. Coronary arteries: Extensive coronary calcifications are noted. Thoracic aorta: The ascending thoracic aorta is borderline ectatic at 4 x 4 cm. The arch and descending thoracic aorta are without evidence of ectasia or aneurysm.  Mild calcifications are noted in the aortic arch and minimal calcifications are noted in the proximal descending thoracic aorta. Pulmonary arteries: Due to the lack of IV contrast, evaluation of the pulmonary arteries is limited. Trachea and bronchi: Unremarkable Pleura: No pneumothorax. No pleural effusion identified. Lungs: No consolidation appreciated. Axilla/Chest Wall: The axilla are unremarkable. Peritoneum: No free air identified. No free fluid appreciated. No fluid collections identified. Liver: Unremarkable Biliary/gallbladder: No intrahepatic or extra hepatic biliary duct dilatation identified. The gallbladder is unremarkable. No pericholecystic inflammation. Spleen: Unremarkable Pancreas: No peripancreatic inflammation appreciated. No pancreatic ductal dilatation identified. No pancreatic lesions identified. : The adrenal glands are unremarkable. Contrast is noted in the renal pelvises and ureters. No perinephric fat stranding identified. No evidence of hydroureteronephrosis. The bladder is unremarkable. GI: The stomach is unremarkable. The small bowel is without evidence of obstruction. No dilated loops appreciated. No small bowel wall thickening identified. The mesentery is without evidence of adenopathy or inflammation. The appendix is unremarkable. No pericolonic inflammatory changes appreciated. No colonic wall thickening identified. Abdominal aorta and retroperitoneum: The abdominal aorta is without evidence of aneurysm. Moderate calcifications noted in the abdominal aorta and its branches. There is possible aneurysm of the left internal iliac artery with dense calcification. It is 1.7 cm in diameter. No periaortic adenopathy identified. No retroperitoneal fluid collection appreciated. Abdominal wall/Inguinal region: Unremarkable Musculoskeletal: Unremarkable     1. Mild ectasia of the ascending thoracic aorta with minimal calcifications in the aortic arch and descending thoracic aorta.  There is moderate calcifications of the abdominal aorta with possible calcified aneurysm of the left internal iliac artery at 1.7 cm. 2.  Extensive coronary calcifications. XR CHEST PORT    Result Date: 1/16/2023  Portable CXR: HISTORY: Postop COMPARISON: 1/15/2023 Post CABG. ET tube tip 3.2 cm above the queenie. NG tube extends into the stomach. Right IJ catheter tip at distal SVC. No pneumothorax. No pulmonary consolidation. No pleural effusion. No vascular congestion. No cardiomegaly. As described. ECHO ADULT FOLLOW-UP OR LIMITED    Result Date: 1/16/2023    Left Ventricle: Normal left ventricular systolic function with a visually estimated EF of 50 - 55%. Left ventricle size is normal. Normal wall thickness. See diagram for wall motion findings. Right Ventricle: Right ventricle is mildly dilated. Normal systolic function. TAPSE is normal. TAPSE is 1.9 cm. Right Atrium: Right atrium is mildly dilated. CARDIAC PROCEDURE    Result Date: 1/17/2023  Acute inferior STEMI 100% occluded proximal/mid very calcified large RCA. Baseline CAROLINE 0 flow post angioplasty CAROLINE-3 flow. Due to severe calcification stent is unable to cross the lesion. Moderate-sized ramus intermedius have a 80% calcified ostial/proximal disease. Large sized mid/distal LAD have 80% disease. Large left main artery have ostial 40% to 50% disease in cranial views with nonischemic IFR of 0.96. LVEDP 16 mmHg. Recommendation. Multivessel CABG. Discussed with patient and wife both agreed.      DUPLEX CAROTID BILATERAL    Result Date: 1/15/2023  Carotid duplex: Bilateral 1-49% stenosis of the internal carotid arteries Antegrade flow of the vertebral arteries Normal flow of the Subclavian arteries        DISCHARGE DIAGNOSES:    CAD s/p CABG on BB, ASA, statin and Plavix  HTN, controlled on BB  HLD  DM2 on metformin at home  Anemia post op expected  Tobacco use disorder  Alcohol use disorder       DISCHARGE DISPOSITION:  Activities: normal, with strict sternal precautions including no heavy lifting and with constant wearing of the sternal harness. Diet: Cardiac Diet and Diabetic Diet  Condition: good  Prognosis:  good    DISCHARGE INSTRUCTIONS:  He is to follow up in the Cardiac Surgery clinic in 7 days and 3 weeks, and will see the primary care physician and cardiologist preferably within one month. The Home health visiting nurses will see him once home. Follow-up appointments: Follow-up Information       Follow up With Specialties Details Why Contact Info    Hollie Lopes, [address]  [phone]              DISCHARGE MEDICATIONS:    Current Discharge Medication List        START taking these medications    Details   furosemide (LASIX) 40 mg tablet Take 1 Tablet by mouth daily for 7 days. Then as directed. Qty: 30 Tablet, Refills: 0  Start date: 1/21/2023, End date: 1/28/2023      potassium chloride (K-DUR, KLOR-CON M20) 20 mEq tablet Take 2 Tablets by mouth two (2) times a day for 7 days. Then as directed with Lasix (furosemide)  Qty: 30 Tablet, Refills: 0  Start date: 1/21/2023, End date: 1/28/2023      multivitamin with iron tablet Take 1 Tablet by mouth daily. Qty: 30 Tablet, Refills: 2  Start date: 1/21/2023      aspirin delayed-release 81 mg tablet Take 1 Tablet by mouth daily. Indications: .  Qty: 30 Tablet, Refills: 2  Start date: 1/21/2023      clopidogreL (PLAVIX) 75 mg tab Take 1 Tablet by mouth daily. Indications: .  Qty: 30 Tablet, Refills: 2  Start date: 1/21/2023      HYDROcodone-acetaminophen (NORCO) 5-325 mg per tablet Take 1 Tablet by mouth every six (6) hours as needed for Pain for up to 7 days. Max Daily Amount: 4 Tablets. Indications: .  Qty: 28 Tablet, Refills: 0  Start date: 1/20/2023, End date: 1/27/2023    Associated Diagnoses: S/P CABG (coronary artery bypass graft)           CONTINUE these medications which have CHANGED    Details   metoprolol tartrate (LOPRESSOR) 50 mg tablet Take 1 Tablet by mouth two (2) times a day. Indications: high blood pressure, . Qty: 60 Tablet, Refills: 2  Start date: 1/21/2023      atorvastatin (LIPITOR) 80 mg tablet Take 1 Tablet by mouth daily. Indications: .  Qty: 30 Tablet, Refills: 2  Start date: 1/20/2023           CONTINUE these medications which have NOT CHANGED    Details   metFORMIN (GLUCOPHAGE) 500 mg tablet Take 1,000 mg by mouth two (2) times daily (with meals). cholecalciferol (VITAMIN D3) (1000 Units /25 mcg) tablet 50 mcg. omeprazole (PRILOSEC) 20 mg capsule Take 1 Cap by mouth daily.   Qty: 30 Cap, Refills: 3           STOP taking these medications       nitroglycerin (NITROSTAT) 0.4 mg SL tablet Comments:   Reason for Stopping:         insulin lispro (HUMALOG) 100 unit/mL injection Comments:   Reason for Stopping:         aspirin 81 mg chewable tablet Comments:   Reason for Stopping:         amLODIPine (NORVASC) 10 mg tablet Comments:   Reason for Stopping:         pravastatin (PRAVACHOL) 40 mg tablet Comments:   Reason for Stopping:               Víctor Rosales PA-C  [facility]  [phone]

## 2023-01-22 RX ORDER — ASPIRIN 81 MG/1
81 TABLET ORAL DAILY
Qty: 30 TABLET | Refills: 2 | Status: SHIPPED | OUTPATIENT
Start: 2023-01-22

## 2023-01-22 RX ORDER — MULTIVITAMIN WITH IRON
1 TABLET ORAL DAILY
Qty: 30 TABLET | Refills: 2 | Status: SHIPPED | OUTPATIENT
Start: 2023-01-22

## 2023-01-22 RX ORDER — METOPROLOL TARTRATE 50 MG/1
50 TABLET ORAL 2 TIMES DAILY
Qty: 60 TABLET | Refills: 2 | Status: SHIPPED | OUTPATIENT
Start: 2023-01-22

## 2023-01-22 RX ORDER — CLOPIDOGREL BISULFATE 75 MG/1
75 TABLET ORAL DAILY
Qty: 30 TABLET | Refills: 2 | Status: SHIPPED | OUTPATIENT
Start: 2023-01-22

## 2023-01-22 RX ORDER — ATORVASTATIN CALCIUM 80 MG/1
80 TABLET, FILM COATED ORAL DAILY
Qty: 30 TABLET | Refills: 2 | Status: SHIPPED | OUTPATIENT
Start: 2023-01-22

## 2023-01-26 NOTE — PROGRESS NOTES
Physician Progress Note      PATIENTDara Seen  CSN #:                  [id number]  :                       1960  ADMIT DATE:       1/15/2023 5:52 AM  DISCH DATE:        1/15/2023 10:50 AM  RESPONDING  PROVIDER #:        Caryn Paredes MD          QUERY TEXT:    Pt admitted with STEMI. Pt noted to have hypotension in cath lab. If possible, please document in the progress notes and discharge summary if you are evaluating and/or treating any of the following: The medical record reflects the following:  Risk Factors: 60 y.o. male with history of diabetes, hypertension, hyperlipidemia presented to ER due to chest pain started at midnight. The chest pain is sharp having pain, located on the middle of the chest no radiation. EKG indicated STEMI. Code STEMI is called in ER. Patient Was Sent to Cath Lab directly from ER. Clinical Indicators: Per H&P - Hypotension in cath lab, Resolved per short time levophed, Monitor HD. Wean pressors, levo as needed  for MAP>65.  1/15 BP - 90/59, 93/64; HR 48, 49  Treatment: Levophed, PHENYLephrine, transferred to [facility] for CABG    Thank you,  Sergio Zarate RN, COOPER [email or web address]  .  Options provided:  -- Postoperative Cardiogenic Shock  -- Cardiogenic Shock  -- Unspecified Shock  -- Shock, please specify type of shock. -- Hypotension without Shock  -- Other - I will add my own diagnosis  -- Disagree - Not applicable / Not valid  -- Disagree - Clinically unable to determine / Unknown  -- Refer to Clinical Documentation Reviewer    PROVIDER RESPONSE TEXT:    Provider disagreed with this query.     Query created by: Jada Harp on 2023 12:11 PM      Electronically signed by:  Caryn Paredes MD 2023 3:50 PM

## 2023-01-27 ENCOUNTER — HOSPITAL ENCOUNTER (OUTPATIENT)
Dept: GENERAL RADIOLOGY | Age: 63
Discharge: HOME | End: 2023-01-27
Payer: OTHER GOVERNMENT

## 2023-01-27 ENCOUNTER — OFFICE VISIT (OUTPATIENT)
Dept: CARDIOTHORACIC SURGERY | Age: 63
End: 2023-01-27
Payer: OTHER GOVERNMENT

## 2023-01-27 VITALS
BODY MASS INDEX: 23.19 KG/M2 | WEIGHT: 175 LBS | HEIGHT: 73 IN | HEART RATE: 76 BPM | TEMPERATURE: 97.7 F | SYSTOLIC BLOOD PRESSURE: 132 MMHG | DIASTOLIC BLOOD PRESSURE: 84 MMHG

## 2023-01-27 DIAGNOSIS — Z95.1 S/P CABG (CORONARY ARTERY BYPASS GRAFT): ICD-10-CM

## 2023-01-27 DIAGNOSIS — Z95.1 S/P CABG (CORONARY ARTERY BYPASS GRAFT): Primary | ICD-10-CM

## 2023-01-27 PROCEDURE — 71046 X-RAY EXAM CHEST 2 VIEWS: CPT

## 2023-01-27 PROCEDURE — 99024 POSTOP FOLLOW-UP VISIT: CPT | Performed by: PHYSICIAN ASSISTANT

## 2023-01-27 NOTE — PROGRESS NOTES
[facility] Progress Note          Today's date: 1/27/2023    Interval History: This is the first visit after discharge 6 days ago following CABG x 3, 1/16/23. Patient not taking Plavix due to problems filling his prescriptions with the [place]. Aspirin, statin and beta-blocker taken as he had those at home. Has prescription to have Plavix filled today. He is doing well at home with minimal discomfort. Slight dyspnea on exertion with long walks. No complaints about his incisions. No leg edema. He is tolerating a diet and moving his bowels. He has been abstaining from alcohol and cigarettes. His blood sugars were reviewed and found to be mostly within range. Assessment:     Satisfactory progress after CABG x3    Plan:     1. Medication reconciliation performed. Anticipatory guidance moving forward discussed   2. Increase activity utilizing sternal precautions  3. Follow-up with PCP and cardiology as scheduled. Follow-up with our service in 2 weeks as scheduled. 4.  No longer need to take potassium and Lasix      Subjective:     Chief Complaint: None    Status post CABG x3    Objective:     Admission Weight: Last Weight   Weight: 79.4 kg (175 lb) Weight: 79.4 kg (175 lb)     No flowsheet data found. Visit Vitals  /84 (BP 1 Location: Right arm, BP Patient Position: Sitting, BP Cuff Size: Adult)   Pulse 76   Temp 97.7 °F (36.5 °C) (Temporal)   Ht 6' 1\" (1.854 m)   Wt 79.4 kg (175 lb)   BMI 23.09 kg/m²       BP Readings from Last 3 Encounters:   01/27/23 132/84   01/21/23 118/82   01/15/23 (!) 162/88     Wt Readings from Last 3 Encounters:   01/27/23 79.4 kg (175 lb)   01/21/23 82.7 kg (182 lb 6.4 oz)   01/15/23 88.5 kg (195 lb)       Physical Exam:  General: well appearing  Neck: No JVD or tracheal deviation. Lungs: Clear to auscultation without wheezes, rales or rhonchi. Chest: Incisions healing well. Sternum is stable.   Heart: Regular rate and rhythm without murmur or rub. Abdomen: Flat  Extremities: No edema. Left leg incisions healing well. Neuro:  DARDEN x 4      RADIOLOGY:   (independently reviewed) no effusion, atx, ptx or infiltrate      Triston Madrid PA-C    PLEASE NOTE:  This document has been produced using voice recognition software. Unrecognized errors in transcription may be present. NOTE TO PATIENT:  The purpose of this note is to communicate optimally with the other providers involved in your care. It is written using standard medical terminology. If you have questions regarding details of the note please call my office at [phone] and make an appointment to discuss your concerns.

## 2023-01-27 NOTE — PATIENT INSTRUCTIONS
Increase activity with sternal precautions. Okay to shower daily. Acetaminophen first-line for pain control. Follow-up with cardiology and primary care physician as scheduled. Follow-up with our service in 2 weeks.

## 2023-01-30 NOTE — PROGRESS NOTES
Physician Progress Note      Valeri Bhandari  [facility] #:                  [id number]  :                       1960  ADMIT DATE:       1/15/2023 5:52 AM  DISCH DATE:        1/15/2023 10:50 AM  RESPONDING  PROVIDER #:        Zunilda TORRES MD          QUERY TEXT:    Patient admitted with STEMI, noted to have sodium level of 129. If possible, please document in progress notes and discharge summary if you are evaluating and/or treating any of the following: The medical record reflects the following:  Risk Factors: 79-year-old male with a past medical history of type 2 diabetes, hypertension, hyperlipidemia, chronic smoker, alcohol abuse presents to the emergency room secondary to chest pain  Clinical Indicators: Na 129  Per 1/15 Pulmonary - Hyponatremia; monitor closely  Treatment:  0.9% Sodium chloride bolus 500mL; Monitor Na level    Thank you,  Maral Brown RN, [email or web address]  .  Options provided:  -- Hyponatremia  -- Hyponatremia not clinically significant  -- Other - I will add my own diagnosis  -- Disagree - Not applicable / Not valid  -- Disagree - Clinically unable to determine / Unknown  -- Refer to Clinical Documentation Reviewer    PROVIDER RESPONSE TEXT:    This patient has hyponatremia.     Query created by: Chetna De La Vega on 2023 9:52 AM      Electronically signed by:  Zunilda Lopes MD 2023 3:01 PM

## 2023-02-10 ENCOUNTER — OFFICE VISIT (OUTPATIENT)
Dept: CARDIOTHORACIC SURGERY | Age: 63
End: 2023-02-10
Payer: OTHER GOVERNMENT

## 2023-02-10 VITALS
TEMPERATURE: 97.7 F | WEIGHT: 178 LBS | OXYGEN SATURATION: 100 % | DIASTOLIC BLOOD PRESSURE: 78 MMHG | HEIGHT: 73 IN | HEART RATE: 62 BPM | BODY MASS INDEX: 23.59 KG/M2 | SYSTOLIC BLOOD PRESSURE: 132 MMHG

## 2023-02-10 DIAGNOSIS — Z95.1 S/P CABG X 3: Primary | ICD-10-CM

## 2023-02-10 PROCEDURE — 99024 POSTOP FOLLOW-UP VISIT: CPT | Performed by: PHYSICIAN ASSISTANT

## 2023-02-10 NOTE — PROGRESS NOTES
[facility] Progress Note          Today's date: 2/10/2023    Interval History: This is the second postoperative visit following CABG x3 on 1/16/2023. Patient's prescriptions have been filled appropriately. He did get a refill of Tea from the [address] He states he has been taking 1 Norco prior to bed. He is progressing his activity level without significant dyspnea. He is tolerating his diet and moving his bowels. He denies any problems with his incisions or pedal edema. Assessment:     Satisfactory progress after CABG x3    Plan:     1. Advised him to stop taking Norco and use acetaminophen as needed for pain. 2.  Continue to advance activity level using sternal precautions. May stop wearing sternal harness in 1 week. Okay to drive after 1 week. Okay to return to work in a month. 3.  Wound care discussed  4. Cardiac rehab referral placed  5. Follow-up with cardiology and PCP as scheduled. No routine follow-up visits with our service needed. Subjective:     Chief Complaint: Status post CABG    No complaint    Objective:     Admission Weight: Last Weight   Weight: 80.7 kg (178 lb) Weight: 80.7 kg (178 lb)     No flowsheet data found. Visit Vitals  /78 (BP 1 Location: Right arm, BP Patient Position: Sitting, BP Cuff Size: Adult)   Pulse 62   Temp 97.7 °F (36.5 °C) (Temporal)   Ht 6' 1\" (1.854 m)   Wt 80.7 kg (178 lb)   SpO2 100%   BMI 23.48 kg/m²       BP Readings from Last 3 Encounters:   02/10/23 132/78   01/27/23 132/84   01/21/23 118/82     Wt Readings from Last 3 Encounters:   02/10/23 80.7 kg (178 lb)   01/27/23 79.4 kg (175 lb)   01/21/23 82.7 kg (182 lb 6.4 oz)       Physical Exam:  General: Well-appearing. No apparent distress. Neck: No JVD or tracheal deviation. Lungs: Clear to auscultation without wheezes, rales or rhonchi. Chest: All incisions healing well. Heart: Regular rate and rhythm without murmur or rub.   Abdomen: Not distended. Active sounds. Soft and nontender. Extremities: Warm and well-perfused. No edema. Neuro: Moves all extremities x4 strong and equal.  No deficit appreciated. Ben Armendariz PA-C    PLEASE NOTE:  This document has been produced using voice recognition software. Unrecognized errors in transcription may be present. NOTE TO PATIENT:  The purpose of this note is to communicate optimally with the other providers involved in your care. It is written using standard medical terminology. If you have questions regarding details of the note please call my office at [phone] and make an appointment to discuss your concerns.

## 2023-03-07 ENCOUNTER — HOSPITAL ENCOUNTER (OUTPATIENT)
Facility: HOSPITAL | Age: 63
Setting detail: RECURRING SERIES
Discharge: HOME | End: 2023-03-10
Payer: OTHER GOVERNMENT

## 2023-03-07 VITALS — BODY MASS INDEX: 23.22 KG/M2 | WEIGHT: 176 LBS

## 2023-03-07 PROCEDURE — 93798 PHYS/QHP OP CAR RHAB W/ECG: CPT

## 2023-03-07 RX ORDER — OMEPRAZOLE 20 MG/1
20 CAPSULE, DELAYED RELEASE ORAL DAILY
COMMUNITY
Start: 2014-03-01 | End: 2023-03-10

## 2023-03-07 RX ORDER — ATORVASTATIN CALCIUM 80 MG/1
80 TABLET, FILM COATED ORAL
COMMUNITY
Start: 2023-01-22

## 2023-03-07 RX ORDER — ASPIRIN 81 MG/1
81 TABLET ORAL
COMMUNITY
Start: 2012-10-02

## 2023-03-07 RX ORDER — HYDROCODONE BITARTRATE AND ACETAMINOPHEN 5; 325 MG/1; MG/1
TABLET ORAL
COMMUNITY
Start: 2023-02-06

## 2023-03-07 RX ORDER — METOPROLOL TARTRATE 50 MG/1
50 TABLET, FILM COATED ORAL
COMMUNITY
Start: 2023-01-22

## 2023-03-07 RX ORDER — POTASSIUM CHLORIDE 20MEQ/15ML
LIQUID (ML) ORAL DAILY
COMMUNITY
End: 2023-03-10

## 2023-03-07 RX ORDER — FUROSEMIDE 40 MG/1
40 TABLET ORAL 2 TIMES DAILY
COMMUNITY

## 2023-03-07 RX ORDER — CLOPIDOGREL BISULFATE 75 MG/1
75 TABLET ORAL
COMMUNITY
Start: 2023-01-22

## 2023-03-07 ASSESSMENT — EXERCISE STRESS TEST
PEAK_HR: 95
PEAK_RPE: 8
PEAK_BP: 156/96
PEAK_BP: 156/96
PEAK_METS: 2.6
PEAK_BP: 160/99
PEAK_HR: 69
PEAK_RPD: 0
PEAK_RPE: 8

## 2023-03-07 ASSESSMENT — EJECTION FRACTION: EF_VALUE: 50

## 2023-03-07 ASSESSMENT — PATIENT HEALTH QUESTIONNAIRE - PHQ9
5. POOR APPETITE OR OVEREATING: 0
1. LITTLE INTEREST OR PLEASURE IN DOING THINGS: 0
SUM OF ALL RESPONSES TO PHQ QUESTIONS 1-9: 2
SUM OF ALL RESPONSES TO PHQ9 QUESTIONS 1 & 2: 0
7. TROUBLE CONCENTRATING ON THINGS, SUCH AS READING THE NEWSPAPER OR WATCHING TELEVISION: 0
SUM OF ALL RESPONSES TO PHQ QUESTIONS 1-9: 2
4. FEELING TIRED OR HAVING LITTLE ENERGY: 2
3. TROUBLE FALLING OR STAYING ASLEEP: 0
2. FEELING DOWN, DEPRESSED OR HOPELESS: 0
SUM OF ALL RESPONSES TO PHQ QUESTIONS 1-9: 2
SUM OF ALL RESPONSES TO PHQ QUESTIONS 1-9: 2
6. FEELING BAD ABOUT YOURSELF - OR THAT YOU ARE A FAILURE OR HAVE LET YOURSELF OR YOUR FAMILY DOWN: 0
9. THOUGHTS THAT YOU WOULD BE BETTER OFF DEAD, OR OF HURTING YOURSELF: 0
8. MOVING OR SPEAKING SO SLOWLY THAT OTHER PEOPLE COULD HAVE NOTICED. OR THE OPPOSITE, BEING SO FIGETY OR RESTLESS THAT YOU HAVE BEEN MOVING AROUND A LOT MORE THAN USUAL: 0

## 2023-03-07 ASSESSMENT — LIFESTYLE VARIABLES: SMOKELESS_TOBACCO: NO

## 2023-03-07 ASSESSMENT — NEW YORK HEART ASSOCIATION (NYHA) CLASSIFICATION: NYHA FUNCTIONAL CLASS: CLASS I

## 2023-03-07 NOTE — PROGRESS NOTES
CARDIAC REHAB INITIAL ITP FOR REVIEW AND SIGNATURE    David Greene [age] y.o. presented to cardiac rehab for an intake and a six minute walk test today with a primary diagnosis of CABG x 3. Patient's EF is 50%. Michael Casas has a history of diabetes, hypertension, hyperlipidemia, and status post CABG. Cardiac risk factors include smoking/ tobacco exposure, dyslipidemia, diabetes mellitus, hypertension, stress and these were reviewed with patient. Michael Casas is   and lives with their spouse. PHQ-9, depression score, is 2 and this is considered to be low. The result was discussed with patient who confirms score to be accurate and if above a 5, a copy of the results were sent to patient's PCP. Patient denied chest pain or SOB during 6 minute walk and the cardiac rhythm was in Normal Sinus Rhythm w/ inverted P wave. Michael Casas will attend exercise and educational sessions 2 days a week in cardiac rehab for 36   sessions. Exceptions noted during intake include none. Goals for Rehab:    Patient name: Michael Casas : 1960         Goals Comments   1. Increase stamina and endurance by the end of next recert. [x] initial  [] met                  [] not met  [] progressing  Pt will increase endurance and stamina to maximize cardiac benefits. 2. Watch educational video on cardiac medications by the end of next recert. [x] initial  [] met                  [] not met  [] progressing Pt will understand actions and side effects of cardiac medications. 3. Exercise within Target Heart Rate range of  by the end of next recert. [x] initial  [] met                  [] not met  [] progressing Pt will exercise within THR range to increase cardiac perfusion. 4. Increase strength by the end of the program.   [x] initial  [] met                  [] not met  [] progressing Pt states his long term goal is to gain strength.         Cardiac ITP     Treatment Diagnosis  Treatment Diagnosis 1: PCI (CABG x 3)  PCI Date: 01/16/23  Treatment Diagnosis 2: STEMI  STEMI Date: 01/15/23  Referral Date: 02/28/23  Co-morbidities: Diabetes (type 2)           Oxygen Intervention  Oxygen Use: No     Individual Treatment Plan  ITP Visit Type: Initial assessment  1st Date of Exercise : 03/07/23  ITP Next Review Date: 04/04/23  Visit #/Total Visits: 1/36  EF%: 50 %  Risk Stratification: Low  ITP: Exercise, Psychosocial, Tobacco, Nutrition, Education   Exercise   Stages of Change: Preparation  DASI Total Score: 23.45  Assisted Devices: None  Lynch Total Score: 0  Test: Six minute walk test    Data Measured Before Walk  Heart Rate: 71  Blood Pressure: (!) 147/96  O2 Saturation: 98  O2 Device: Room air  RPD: 0  RPE: 6      Data Measured during Walk  Indicate Mode of RPE: 6 minutes  RPE Data Measured: During  Treadmill Speed: 2.1 mph  Symptoms:  (None)  Any problems while exercising: None  Do You Have Shortness of Breath: No  Describe Type of Pain You Are Having: None  Peak RPE: 8  Peak RPD: 0  NYHA Heart Failure Classification: Class I    Data Measured Immediately After Walk  Distance Walked in : Feet   Distance Walked (ft): 1100 ft  Peak Heart Rate: 69  Peak Blood Pressure: (!) 160/99  Modified Parul Scale:  No breathlessness at all  RPE: 9  O2 Saturation: 98      Data Measured at 5 Minutes After Walk  Heart Rate: 71  Blood Pressure: (!) 156/96  SpO2: 99 %  O2 Device: Room air      Exercise Prescription  Mode: Treadmill, Stepper, Bike, Ergometer, Elliptical  Frequency per week: 2-3  Duration Per Session: 31/45  Intensity METS      : 2.60  RPE: 11-13  Progression: Inital  Symptoms with Exercise:  (None)  Target Heart Rate:   Resistance Training: Yes   Exercise Blood Pressures  Resting BP: 147/96  Peak BP: 156/96  Is BP WDL: No    Exercise Activity at Home  Type: Walking  Frequency: daily  Duration: 30 minutes  Resistance Training: Yes    Exercise Education  Education: Self pulse, Exercise safety, RPE scale, Signs/symptoms to report, Equipment orientation, Physically active    Exercise Target Goal  Target Goal(s):  Individual exercise RX, BP < 140/90 or < 130/80, if DM or CKD, Aerobic activity 30 + minutes/day  5 days/week    Psychosocial  Stages of Change: Action  [facility] Total Score: 30  PHQ-9 Total Score: 2    Psychosocial Intervention  Interventions: No intervention indicated  Currently Taking Psychotropic Meds: No  Medication Changes:  (Inital)    Psychosocial Education  Education: Cardiac meds    Psychosocial Target Goals  Uses Stress Mgmt Techniques: Yes    Tobacco  Stages of Change: Preparation  Tobacco Use: No  Quit: Less than 6 month  Date Started: 01/15/23  # Cigarette Smoked/Day: 1 pack every 2 days  Smokeless Tobacco Use: No    Tobacco Education  Resource Information Provided: Yes  Tobacco Triggers: Drinking and a full meal         Nutrition  Stages of Change: Preparation  Diabetes: Yes (type 2)  HgbA1c: 5.6  HgbA1c Date: 01/15/23  BG at Home: Yes  BG Frequency: daily after breakfast  Diabetes Med(s): Metformin  Diabetes Med(s) Change:  (Inital)              HgbA1c: 5.6    HgbA1c Date: 01/15/23    BG at Home: Yes    Diabetes Med(s): Metformin    Diabetes Med(s) Change:  (Inital)              Lipids  Date Lipids Drawn:  (2014)  Total: 140  HDL: 27  LDL: 74.2  Triglycerides: 194  Lipid Med(s): Lipitor  Lipid Med Change(s):  (Inital)    Weight Management  Weight : 176 lb (79.8 kg)  Height : 6' 1\" (185.4 cm)  BMI: 23.27  Waist Circumference : 34\"  Alcohol: None  Rate Your Plate Total Score: 51    Nutrition Intervention  Dietitian Consult: No  Nurse/Patient Discussion: Yes  Nutrition Class: No  Diabetes Education Referral: No  Lipid Clinic Referral: No  Weight Management Referral: No    Nutrition Education  Education: Signs/symptoms/treatment of hypo/hyperglycemia, DM & CAD relationship, Low saturated fat diet, Low sodium diet, Limit added sugars, Overall healthy eating pattern that emphasizes vegetables, fruits, wholegrains, healthy proteins and non-tropical oils, Reduced calorie/portion controlled diet    Nutrition Target Goals  Target Goals: LDL-C less than 70 and non-HDL-C <100 for those with ASCVD, Triglycerides less than 150         Education  Learning Barrier: Ready to learn    Education Intervention  Education Schedule Given: Yes    Patient Education   Education: CAD, Risk factors, Med compliance, Cardiac A&P, Signs/Symptoms of angina  Hypertension: Yes  Hypertension Controlled: Yes  Is BP WDL: No  Med(s) Change:  (Inital)  BP Meds: Lasix, ASA, Plavix, Metoprolol, Lipitor    Education Target Goals  Target Goals: Medication compliance, Risk factors, Understand target guidelines for lipids, Understand target guidelines for B/P  Patient Stated Education Goals: Pt states he wants to get stronger. Staff Treatment Goals  Goals: Exercise, Blood pressure, Functional capacity, Psychosocial, Tobacco, Nutrition, Lipids, Medications, Education  Exercise Goal: Pt wants to gain strength. Exercise Goal Status: Initial             Exercise Log     Rehab Common Questions  Any Problems or Changes Since Your Last Visit :  (Inital)  Any Symptoms While Exercising: Denies  Psychosocial/Stress Level: 0  Resting EKG Rhythm: SR w/ inverted P waves  Tobacco Use: Quit (add date in comments) (1/15/23)  Enter O2 Saturation and Liter Flow: 98  ITP New Review Date: 04/04/23  Visit Number/Total Visits: 1/36  What is Plan for Next Session: Exercise  On Call Medical Director Immediately Available:  Pb    Exercise Treatment Log  Target Heart Rate (Range):   Resting HR: 69  Resting BP: 147/96  Recovery HR: 68  Recovery BP: 156/96  Weight: 176 lb (79.8 kg)  Exercise EKG Rhythm: SR w/ inverted P wave  Exercise Duration: 6  Peak HR: 95  Peak BP: 156/96  Peak RPE: 8  Peak Mets: 2.6  SOB: 0  Angina: 0  Claudication: 0  Asymptomatic: Yes  O2 Saturation: 99  Total Minutes: 16      Debra Robins RN 3/7/2023 1:14 PM

## 2023-03-10 ENCOUNTER — HOSPITAL ENCOUNTER (OUTPATIENT)
Facility: HOSPITAL | Age: 63
Setting detail: RECURRING SERIES
Discharge: HOME | End: 2023-03-13
Payer: OTHER GOVERNMENT

## 2023-03-10 VITALS — BODY MASS INDEX: 23.35 KG/M2 | WEIGHT: 177 LBS

## 2023-03-10 PROCEDURE — 93798 PHYS/QHP OP CAR RHAB W/ECG: CPT

## 2023-03-10 RX ORDER — M-VIT,TX,IRON,MINS/CALC/FOLIC 27MG-0.4MG
1 TABLET ORAL DAILY
COMMUNITY

## 2023-03-10 RX ORDER — HYDROCHLOROTHIAZIDE 25 MG/1
25 TABLET ORAL DAILY
COMMUNITY

## 2023-03-10 ASSESSMENT — EXERCISE STRESS TEST
PEAK_BP: 139/90
PEAK_RPE: 11

## 2023-03-13 ENCOUNTER — HOSPITAL ENCOUNTER (OUTPATIENT)
Facility: HOSPITAL | Age: 63
Setting detail: RECURRING SERIES
Discharge: HOME | End: 2023-03-16
Payer: OTHER GOVERNMENT

## 2023-03-13 VITALS — BODY MASS INDEX: 23.62 KG/M2 | WEIGHT: 179 LBS

## 2023-03-13 PROCEDURE — 93798 PHYS/QHP OP CAR RHAB W/ECG: CPT

## 2023-03-13 ASSESSMENT — EXERCISE STRESS TEST
PEAK_RPE: 11
PEAK_BP: 156/99

## 2023-03-15 ENCOUNTER — HOSPITAL ENCOUNTER (OUTPATIENT)
Facility: HOSPITAL | Age: 63
Setting detail: RECURRING SERIES
Discharge: HOME | End: 2023-03-18
Payer: OTHER GOVERNMENT

## 2023-03-15 VITALS — WEIGHT: 176 LBS | BODY MASS INDEX: 23.22 KG/M2

## 2023-03-15 PROCEDURE — 93798 PHYS/QHP OP CAR RHAB W/ECG: CPT

## 2023-03-15 ASSESSMENT — EXERCISE STRESS TEST
PEAK_RPE: 13
PEAK_BP: 151/98

## 2023-03-15 NOTE — PROGRESS NOTES
Discussed elevated BP with Mr. Stern Console. Reviewed medication and he had stopped lasix per physician about 1 month ago. Educated on taking and recording Bp at home and discussing elevated BP with cp and or cardiologist.  Pt. Verbalized understanding.

## 2023-03-17 ENCOUNTER — HOSPITAL ENCOUNTER (OUTPATIENT)
Facility: HOSPITAL | Age: 63
Setting detail: RECURRING SERIES
Discharge: HOME | End: 2023-03-20
Payer: OTHER GOVERNMENT

## 2023-03-17 VITALS — WEIGHT: 175 LBS | BODY MASS INDEX: 23.09 KG/M2

## 2023-03-17 PROCEDURE — 93798 PHYS/QHP OP CAR RHAB W/ECG: CPT

## 2023-03-17 ASSESSMENT — EXERCISE STRESS TEST
PEAK_RPE: 13
PEAK_BP: 156/87
PEAK_HR: 124

## 2023-03-20 ENCOUNTER — HOSPITAL ENCOUNTER (OUTPATIENT)
Facility: HOSPITAL | Age: 63
Setting detail: RECURRING SERIES
Discharge: HOME | End: 2023-03-23
Payer: OTHER GOVERNMENT

## 2023-03-20 VITALS — WEIGHT: 176 LBS | BODY MASS INDEX: 23.22 KG/M2

## 2023-03-20 PROCEDURE — 93798 PHYS/QHP OP CAR RHAB W/ECG: CPT

## 2023-03-20 ASSESSMENT — EXERCISE STRESS TEST
PEAK_BP: 153/97
PEAK_HR: 85
PEAK_RPE: 14

## 2023-03-22 ENCOUNTER — HOSPITAL ENCOUNTER (OUTPATIENT)
Facility: HOSPITAL | Age: 63
Setting detail: RECURRING SERIES
Discharge: HOME | End: 2023-03-25
Payer: OTHER GOVERNMENT

## 2023-03-22 VITALS — WEIGHT: 176 LBS | BODY MASS INDEX: 23.22 KG/M2

## 2023-03-22 PROCEDURE — 93798 PHYS/QHP OP CAR RHAB W/ECG: CPT

## 2023-03-22 ASSESSMENT — EXERCISE STRESS TEST
PEAK_HR: 94
PEAK_BP: 153/96
PEAK_RPE: 13

## 2023-03-24 ENCOUNTER — HOSPITAL ENCOUNTER (OUTPATIENT)
Facility: HOSPITAL | Age: 63
Setting detail: RECURRING SERIES
Discharge: HOME | End: 2023-03-27
Payer: OTHER GOVERNMENT

## 2023-03-24 VITALS — WEIGHT: 175 LBS | BODY MASS INDEX: 23.09 KG/M2

## 2023-03-24 PROCEDURE — 93798 PHYS/QHP OP CAR RHAB W/ECG: CPT

## 2023-03-24 RX ORDER — LOSARTAN POTASSIUM 25 MG/1
25 TABLET ORAL DAILY
COMMUNITY

## 2023-03-24 ASSESSMENT — EXERCISE STRESS TEST
PEAK_RPE: 13
PEAK_BP: 152/88
PEAK_HR: 111

## 2023-03-27 ENCOUNTER — HOSPITAL ENCOUNTER (OUTPATIENT)
Facility: HOSPITAL | Age: 63
Setting detail: RECURRING SERIES
Discharge: HOME | End: 2023-03-30
Payer: OTHER GOVERNMENT

## 2023-03-27 VITALS — BODY MASS INDEX: 23.48 KG/M2 | WEIGHT: 178 LBS

## 2023-03-27 PROCEDURE — 93798 PHYS/QHP OP CAR RHAB W/ECG: CPT

## 2023-03-27 ASSESSMENT — EXERCISE STRESS TEST
PEAK_RPE: 11
PEAK_HR: 78
PEAK_BP: 148/94

## 2023-03-29 ENCOUNTER — HOSPITAL ENCOUNTER (OUTPATIENT)
Facility: HOSPITAL | Age: 63
Setting detail: RECURRING SERIES
Discharge: HOME | End: 2023-04-01
Payer: OTHER GOVERNMENT

## 2023-03-29 VITALS — BODY MASS INDEX: 23.75 KG/M2 | WEIGHT: 180 LBS

## 2023-03-29 PROCEDURE — 93798 PHYS/QHP OP CAR RHAB W/ECG: CPT

## 2023-03-29 ASSESSMENT — EXERCISE STRESS TEST
PEAK_HR: 100
PEAK_BP: 137/87
PEAK_RPE: 13

## 2023-03-31 ENCOUNTER — HOSPITAL ENCOUNTER (OUTPATIENT)
Facility: HOSPITAL | Age: 63
Setting detail: RECURRING SERIES
End: 2023-03-31
Payer: OTHER GOVERNMENT

## 2023-03-31 VITALS — BODY MASS INDEX: 23.62 KG/M2 | WEIGHT: 179 LBS

## 2023-03-31 PROCEDURE — 93798 PHYS/QHP OP CAR RHAB W/ECG: CPT

## 2023-03-31 ASSESSMENT — EXERCISE STRESS TEST
PEAK_HR: 87
PEAK_BP: 144/90
PEAK_RPE: 11

## 2023-04-03 ENCOUNTER — HOSPITAL ENCOUNTER (OUTPATIENT)
Facility: HOSPITAL | Age: 63
Setting detail: RECURRING SERIES
Discharge: HOME | End: 2023-04-06
Payer: OTHER GOVERNMENT

## 2023-04-03 VITALS — BODY MASS INDEX: 23.62 KG/M2 | WEIGHT: 179 LBS

## 2023-04-03 PROCEDURE — 93798 PHYS/QHP OP CAR RHAB W/ECG: CPT

## 2023-04-03 ASSESSMENT — EXERCISE STRESS TEST
PEAK_RPE: 9
PEAK_BP: 150/90
PEAK_BP: 126/80
PEAK_HR: 77

## 2023-04-03 ASSESSMENT — LIFESTYLE VARIABLES: SMOKELESS_TOBACCO: NO

## 2023-04-03 ASSESSMENT — EJECTION FRACTION: EF_VALUE: 50

## 2023-04-03 NOTE — PROGRESS NOTES
75  Resting BP: 144/90  Recovery HR: 76  Recovery BP: 133/87  Weight: 179 lb (81.2 kg)  Exercise EKG Rhythm: SR w/ inverted T waves and PVCs  Exercise Duration: 40  Peak HR: 87  Peak BP: 144/90  Peak RPE: 11  Peak Mets: 2.6  SOB: 0  Angina: 0  Claudication: 0  Asymptomatic: Yes  O2 Saturation: 99  Total Minutes: 50           Hortensia Merlos RN 4/3/2023 11:37 AM

## 2023-04-05 ENCOUNTER — HOSPITAL ENCOUNTER (OUTPATIENT)
Facility: HOSPITAL | Age: 63
Setting detail: RECURRING SERIES
Discharge: HOME | End: 2023-04-08
Payer: OTHER GOVERNMENT

## 2023-04-05 VITALS — WEIGHT: 178 LBS | BODY MASS INDEX: 23.48 KG/M2

## 2023-04-05 PROCEDURE — 93798 PHYS/QHP OP CAR RHAB W/ECG: CPT

## 2023-04-05 ASSESSMENT — EXERCISE STRESS TEST
PEAK_HR: 99
PEAK_RPE: 13
PEAK_BP: 136/97

## 2023-04-14 ENCOUNTER — APPOINTMENT (OUTPATIENT)
Facility: HOSPITAL | Age: 63
End: 2023-04-14
Payer: OTHER GOVERNMENT

## 2023-04-17 ENCOUNTER — HOSPITAL ENCOUNTER (OUTPATIENT)
Facility: HOSPITAL | Age: 63
Setting detail: RECURRING SERIES
Discharge: HOME | End: 2023-04-20
Payer: OTHER GOVERNMENT

## 2023-04-17 VITALS — WEIGHT: 180 LBS | BODY MASS INDEX: 23.75 KG/M2

## 2023-04-17 PROCEDURE — 93798 PHYS/QHP OP CAR RHAB W/ECG: CPT

## 2023-04-17 ASSESSMENT — EXERCISE STRESS TEST
PEAK_RPE: 6
PEAK_HR: 87
PEAK_BP: 150/88

## 2023-04-19 ENCOUNTER — APPOINTMENT (OUTPATIENT)
Facility: HOSPITAL | Age: 63
End: 2023-04-19
Payer: OTHER GOVERNMENT

## 2023-04-21 ENCOUNTER — HOSPITAL ENCOUNTER (OUTPATIENT)
Facility: HOSPITAL | Age: 63
Setting detail: RECURRING SERIES
Discharge: HOME | End: 2023-04-24
Payer: OTHER GOVERNMENT

## 2023-04-21 VITALS — WEIGHT: 183 LBS | BODY MASS INDEX: 24.14 KG/M2

## 2023-04-21 PROCEDURE — 93798 PHYS/QHP OP CAR RHAB W/ECG: CPT

## 2023-04-21 ASSESSMENT — EXERCISE STRESS TEST
PEAK_BP: 144/92
PEAK_RPE: 11
PEAK_HR: 96

## 2023-04-24 ENCOUNTER — HOSPITAL ENCOUNTER (OUTPATIENT)
Facility: HOSPITAL | Age: 63
Setting detail: RECURRING SERIES
Discharge: HOME | End: 2023-04-27
Payer: OTHER GOVERNMENT

## 2023-04-24 VITALS — BODY MASS INDEX: 23.88 KG/M2 | WEIGHT: 181 LBS

## 2023-04-24 PROCEDURE — 93798 PHYS/QHP OP CAR RHAB W/ECG: CPT

## 2023-04-24 ASSESSMENT — EXERCISE STRESS TEST
PEAK_BP: 143/90
PEAK_HR: 88
PEAK_RPE: 13

## 2023-04-26 ENCOUNTER — HOSPITAL ENCOUNTER (OUTPATIENT)
Facility: HOSPITAL | Age: 63
Setting detail: RECURRING SERIES
Discharge: HOME | End: 2023-04-29
Payer: OTHER GOVERNMENT

## 2023-04-26 VITALS — WEIGHT: 179 LBS | BODY MASS INDEX: 23.62 KG/M2

## 2023-04-26 PROCEDURE — 93798 PHYS/QHP OP CAR RHAB W/ECG: CPT

## 2023-04-26 ASSESSMENT — EXERCISE STRESS TEST
PEAK_HR: 79
PEAK_BP: 127/86
PEAK_RPE: 11

## 2023-04-28 ENCOUNTER — HOSPITAL ENCOUNTER (OUTPATIENT)
Facility: HOSPITAL | Age: 63
Setting detail: RECURRING SERIES
Discharge: HOME | End: 2023-05-01
Payer: OTHER GOVERNMENT

## 2023-04-28 VITALS — WEIGHT: 177 LBS | BODY MASS INDEX: 23.35 KG/M2

## 2023-04-28 PROCEDURE — 93798 PHYS/QHP OP CAR RHAB W/ECG: CPT

## 2023-04-28 ASSESSMENT — EXERCISE STRESS TEST
PEAK_RPE: 13
PEAK_BP: 133/90
PEAK_HR: 99

## 2023-05-01 ENCOUNTER — HOSPITAL ENCOUNTER (OUTPATIENT)
Facility: HOSPITAL | Age: 63
Setting detail: RECURRING SERIES
Discharge: HOME | End: 2023-05-04
Payer: OTHER GOVERNMENT

## 2023-05-01 VITALS — WEIGHT: 184 LBS | BODY MASS INDEX: 24.28 KG/M2

## 2023-05-01 PROCEDURE — 93798 PHYS/QHP OP CAR RHAB W/ECG: CPT

## 2023-05-01 ASSESSMENT — LIFESTYLE VARIABLES: SMOKELESS_TOBACCO: NO

## 2023-05-01 ASSESSMENT — EJECTION FRACTION: EF_VALUE: 50

## 2023-05-01 ASSESSMENT — EXERCISE STRESS TEST
PEAK_BP: 129/85
PEAK_RPE: 11
PEAK_HR: 116
PEAK_BP: 129/85

## 2023-05-01 NOTE — PROGRESS NOTES
CARDIAC REHAB ITP REASSESSMENT FOR REVIEW AND SIGNATURE  Patient name: Heron Feliz : 1960     Visits from Start of Care:                                    Reporting Period: 4/3/2023-2023     Subjective Reports: Pt is progressing well, no complications. Goals Comments   1. Watch educational video on healthy dieting by the end of next recert. [] met                  [] not met  [x] progressing  Pt will apply knowledge from video to diet. 2. Exercise within Target Heart Rate range of  by the end of next recert. [] met                  [] not met  [x] progressing Pt will exercise within [facility] for adequate cardiac perfusion. 3. Watch educational video on prescribed cardiac medications by the end of next recert. [] met                  [] not met  [x] progressing Pt will understand action and side effects of cardiac medications prescribed. 4. Exercise within Rate of Perceived Exertion range of 11-13 by the end of next recert. [x] met                  [] not met  [x] progressing Pt will exercise within RPE for optimum tissue perfusion. Key functional changes: Mr. Bety Hernandez has increase speed and strength. Problems/ barriers to goal attainment: None     Assessment / Recommendations: Continue with rehab 3x times a week.       Cardiac ITP    Treatment Diagnosis  Treatment Diagnosis 1: PCI (CABG x3)  PCI Date: 23  Treatment Diagnosis 2: STEMI  STEMI Date: 01/15/23  Referral Date: 23  Co-morbidities: Diabetes (Type 2)         Oxygen Intervention  Oxygen Use: No    Individual Treatment Plan  ITP Visit Type: Re-assessment  1st Date of Exercise : 23  ITP Next Review Date: 23  Visit #/Total Visits:   EF%: 50 %  Risk Stratification: Low  ITP: Exercise, Psychosocial, Tobacco, Nutrition, Education   Exercise   Stages of Change: Action  Assisted Devices: None                        Exercise Prescription  Mode: Treadmill, Stepper, Bike, Ergometer,

## 2023-05-03 ENCOUNTER — HOSPITAL ENCOUNTER (OUTPATIENT)
Facility: HOSPITAL | Age: 63
Setting detail: RECURRING SERIES
Discharge: HOME | End: 2023-05-06
Payer: OTHER GOVERNMENT

## 2023-05-03 VITALS — WEIGHT: 178 LBS | BODY MASS INDEX: 23.48 KG/M2

## 2023-05-03 PROCEDURE — 93798 PHYS/QHP OP CAR RHAB W/ECG: CPT

## 2023-05-03 ASSESSMENT — EXERCISE STRESS TEST
PEAK_BP: 128/85
PEAK_HR: 101
PEAK_RPE: 13

## 2023-05-05 ENCOUNTER — HOSPITAL ENCOUNTER (OUTPATIENT)
Facility: HOSPITAL | Age: 63
Setting detail: RECURRING SERIES
Discharge: HOME | End: 2023-05-08
Payer: OTHER GOVERNMENT

## 2023-05-05 VITALS — WEIGHT: 183 LBS | BODY MASS INDEX: 24.14 KG/M2

## 2023-05-05 PROCEDURE — 93798 PHYS/QHP OP CAR RHAB W/ECG: CPT

## 2023-05-05 ASSESSMENT — EXERCISE STRESS TEST
PEAK_BP: 131/81
PEAK_RPE: 13
PEAK_HR: 100

## 2023-05-08 ENCOUNTER — HOSPITAL ENCOUNTER (OUTPATIENT)
Facility: HOSPITAL | Age: 63
Setting detail: RECURRING SERIES
Discharge: HOME | End: 2023-05-11
Payer: OTHER GOVERNMENT

## 2023-05-08 VITALS — WEIGHT: 179 LBS | BODY MASS INDEX: 23.62 KG/M2

## 2023-05-08 PROCEDURE — 93798 PHYS/QHP OP CAR RHAB W/ECG: CPT

## 2023-05-08 ASSESSMENT — EXERCISE STRESS TEST
PEAK_BP: 135/91
PEAK_RPE: 13
PEAK_HR: 102

## 2023-05-10 ENCOUNTER — HOSPITAL ENCOUNTER (OUTPATIENT)
Facility: HOSPITAL | Age: 63
Setting detail: RECURRING SERIES
Discharge: HOME | End: 2023-05-13
Payer: OTHER GOVERNMENT

## 2023-05-10 VITALS — WEIGHT: 183 LBS | BODY MASS INDEX: 24.14 KG/M2

## 2023-05-10 PROCEDURE — 93798 PHYS/QHP OP CAR RHAB W/ECG: CPT

## 2023-05-10 ASSESSMENT — EXERCISE STRESS TEST
PEAK_BP: 133/85
PEAK_RPE: 12
PEAK_HR: 94

## 2023-05-12 ENCOUNTER — HOSPITAL ENCOUNTER (OUTPATIENT)
Facility: HOSPITAL | Age: 63
Setting detail: RECURRING SERIES
Discharge: HOME | End: 2023-05-15
Payer: OTHER GOVERNMENT

## 2023-05-12 VITALS — BODY MASS INDEX: 23.75 KG/M2 | WEIGHT: 180 LBS

## 2023-05-12 PROCEDURE — 93798 PHYS/QHP OP CAR RHAB W/ECG: CPT

## 2023-05-12 ASSESSMENT — EXERCISE STRESS TEST
PEAK_RPE: 13
PEAK_BP: 135/95
PEAK_HR: 109

## 2023-05-15 ENCOUNTER — HOSPITAL ENCOUNTER (OUTPATIENT)
Facility: HOSPITAL | Age: 63
Setting detail: RECURRING SERIES
Discharge: HOME | End: 2023-05-18
Payer: OTHER GOVERNMENT

## 2023-05-15 VITALS — WEIGHT: 183 LBS | BODY MASS INDEX: 24.14 KG/M2

## 2023-05-15 PROCEDURE — 93798 PHYS/QHP OP CAR RHAB W/ECG: CPT

## 2023-05-15 ASSESSMENT — EXERCISE STRESS TEST
PEAK_RPE: 11
PEAK_HR: 85
PEAK_BP: 136/86

## 2023-05-17 ENCOUNTER — HOSPITAL ENCOUNTER (OUTPATIENT)
Facility: HOSPITAL | Age: 63
Setting detail: RECURRING SERIES
Discharge: HOME | End: 2023-05-20
Payer: OTHER GOVERNMENT

## 2023-05-17 VITALS — WEIGHT: 181 LBS | BODY MASS INDEX: 23.88 KG/M2

## 2023-05-17 PROCEDURE — 93798 PHYS/QHP OP CAR RHAB W/ECG: CPT

## 2023-05-17 ASSESSMENT — EXERCISE STRESS TEST
PEAK_RPE: 13
PEAK_BP: 133/86
PEAK_HR: 109

## 2023-05-19 ENCOUNTER — HOSPITAL ENCOUNTER (OUTPATIENT)
Facility: HOSPITAL | Age: 63
Setting detail: RECURRING SERIES
Discharge: HOME | End: 2023-05-22
Payer: OTHER GOVERNMENT

## 2023-05-19 VITALS — WEIGHT: 185 LBS | BODY MASS INDEX: 24.41 KG/M2

## 2023-05-19 PROCEDURE — 93798 PHYS/QHP OP CAR RHAB W/ECG: CPT

## 2023-05-19 ASSESSMENT — EXERCISE STRESS TEST
PEAK_BP: 142/93
PEAK_RPE: 11
PEAK_HR: 102

## 2023-05-24 ENCOUNTER — HOSPITAL ENCOUNTER (OUTPATIENT)
Facility: HOSPITAL | Age: 63
Setting detail: RECURRING SERIES
Discharge: HOME | End: 2023-05-27
Payer: OTHER GOVERNMENT

## 2023-05-24 VITALS — WEIGHT: 185 LBS | BODY MASS INDEX: 24.41 KG/M2

## 2023-05-24 PROCEDURE — 93798 PHYS/QHP OP CAR RHAB W/ECG: CPT

## 2023-05-24 ASSESSMENT — EXERCISE STRESS TEST
PEAK_BP: 116/77
PEAK_HR: 102
PEAK_RPE: 12

## 2023-05-26 ENCOUNTER — HOSPITAL ENCOUNTER (OUTPATIENT)
Facility: HOSPITAL | Age: 63
Setting detail: RECURRING SERIES
Discharge: HOME | End: 2023-05-29
Payer: OTHER GOVERNMENT

## 2023-05-26 VITALS — BODY MASS INDEX: 24.14 KG/M2 | WEIGHT: 183 LBS

## 2023-05-26 PROCEDURE — 93798 PHYS/QHP OP CAR RHAB W/ECG: CPT

## 2023-05-26 RX ORDER — CYCLOBENZAPRINE HCL 5 MG
5 TABLET ORAL 3 TIMES DAILY PRN
COMMUNITY

## 2023-05-26 ASSESSMENT — LIFESTYLE VARIABLES: SMOKELESS_TOBACCO: NO

## 2023-05-26 ASSESSMENT — EXERCISE STRESS TEST
PEAK_RPE: 13
PEAK_BP: 105/71
PEAK_BP: 116/83
PEAK_HR: 99

## 2023-05-26 ASSESSMENT — EJECTION FRACTION: EF_VALUE: 50

## 2023-05-31 ENCOUNTER — HOSPITAL ENCOUNTER (OUTPATIENT)
Facility: HOSPITAL | Age: 63
Setting detail: RECURRING SERIES
End: 2023-05-31
Payer: OTHER GOVERNMENT

## 2023-06-02 ENCOUNTER — HOSPITAL ENCOUNTER (OUTPATIENT)
Facility: HOSPITAL | Age: 63
Setting detail: RECURRING SERIES
Discharge: HOME | End: 2023-06-05
Payer: OTHER GOVERNMENT

## 2023-06-02 VITALS — WEIGHT: 187 LBS | BODY MASS INDEX: 24.67 KG/M2

## 2023-06-02 PROCEDURE — 93798 PHYS/QHP OP CAR RHAB W/ECG: CPT

## 2023-06-02 ASSESSMENT — EXERCISE STRESS TEST
PEAK_BP: 135/85
PEAK_HR: 110
PEAK_RPE: 11

## 2023-06-05 ENCOUNTER — HOSPITAL ENCOUNTER (OUTPATIENT)
Facility: HOSPITAL | Age: 63
Setting detail: RECURRING SERIES
Discharge: HOME | End: 2023-06-08
Payer: OTHER GOVERNMENT

## 2023-06-05 VITALS — BODY MASS INDEX: 23.88 KG/M2 | WEIGHT: 181 LBS

## 2023-06-05 PROCEDURE — 93798 PHYS/QHP OP CAR RHAB W/ECG: CPT

## 2023-06-05 ASSESSMENT — EXERCISE STRESS TEST
PEAK_HR: 87
PEAK_RPE: 11
PEAK_BP: 138/82
PEAK_METS: 4.1

## 2023-06-07 ENCOUNTER — HOSPITAL ENCOUNTER (OUTPATIENT)
Facility: HOSPITAL | Age: 63
Setting detail: RECURRING SERIES
Discharge: HOME | End: 2023-06-10
Payer: OTHER GOVERNMENT

## 2023-06-07 VITALS — WEIGHT: 184 LBS | BODY MASS INDEX: 24.28 KG/M2

## 2023-06-07 PROCEDURE — 93797 PHYS/QHP OP CAR RHAB WO ECG: CPT

## 2023-06-07 PROCEDURE — 93798 PHYS/QHP OP CAR RHAB W/ECG: CPT

## 2023-06-07 ASSESSMENT — EXERCISE STRESS TEST
PEAK_RPE: 11
PEAK_BP: 140/98

## 2023-07-31 ENCOUNTER — HOSPITAL ENCOUNTER (EMERGENCY)
Facility: HOSPITAL | Age: 63
Discharge: HOME | End: 2023-07-31
Attending: EMERGENCY MEDICINE
Payer: OTHER GOVERNMENT

## 2023-07-31 VITALS
RESPIRATION RATE: 18 BRPM | WEIGHT: 181 LBS | BODY MASS INDEX: 23.99 KG/M2 | DIASTOLIC BLOOD PRESSURE: 94 MMHG | HEART RATE: 70 BPM | TEMPERATURE: 97.1 F | OXYGEN SATURATION: 99 % | SYSTOLIC BLOOD PRESSURE: 161 MMHG | HEIGHT: 73 IN

## 2023-07-31 DIAGNOSIS — I10 HYPERTENSION, UNSPECIFIED TYPE: Primary | ICD-10-CM

## 2023-07-31 LAB
ANION GAP SERPL CALC-SCNC: 8 MMOL/L (ref 3–18)
BASOPHILS # BLD: 0 K/UL (ref 0–0.1)
BASOPHILS NFR BLD: 1 % (ref 0–2)
BUN SERPL-MCNC: 9 MG/DL (ref 7–18)
BUN/CREAT SERPL: 13 (ref 12–20)
CALCIUM SERPL-MCNC: 9.2 MG/DL (ref 8.5–10.1)
CHLORIDE SERPL-SCNC: 98 MMOL/L (ref 100–111)
CO2 SERPL-SCNC: 26 MMOL/L (ref 21–32)
CREAT SERPL-MCNC: 0.68 MG/DL (ref 0.6–1.3)
DIFFERENTIAL METHOD BLD: ABNORMAL
EOSINOPHIL # BLD: 0.2 K/UL (ref 0–0.4)
EOSINOPHIL NFR BLD: 3 % (ref 0–5)
ERYTHROCYTE [DISTWIDTH] IN BLOOD BY AUTOMATED COUNT: 14.6 % (ref 11.6–14.5)
GLUCOSE SERPL-MCNC: 73 MG/DL (ref 74–99)
HCT VFR BLD AUTO: 39.1 % (ref 36–48)
HGB BLD-MCNC: 14.2 G/DL (ref 13–16)
IMM GRANULOCYTES # BLD AUTO: 0 K/UL (ref 0–0.04)
IMM GRANULOCYTES NFR BLD AUTO: 0 % (ref 0–0.5)
LYMPHOCYTES # BLD: 2.6 K/UL (ref 0.9–3.6)
LYMPHOCYTES NFR BLD: 38 % (ref 21–52)
MCH RBC QN AUTO: 31.6 PG (ref 24–34)
MCHC RBC AUTO-ENTMCNC: 36.3 G/DL (ref 31–37)
MCV RBC AUTO: 87.1 FL (ref 78–100)
MONOCYTES # BLD: 0.7 K/UL (ref 0.05–1.2)
MONOCYTES NFR BLD: 10 % (ref 3–10)
NEUTS SEG # BLD: 3.3 K/UL (ref 1.8–8)
NEUTS SEG NFR BLD: 49 % (ref 40–73)
NRBC # BLD: 0 K/UL (ref 0–0.01)
NRBC BLD-RTO: 0 PER 100 WBC
PLATELET # BLD AUTO: 199 K/UL (ref 135–420)
PMV BLD AUTO: 8.9 FL (ref 9.2–11.8)
POTASSIUM SERPL-SCNC: 4 MMOL/L (ref 3.5–5.5)
RBC # BLD AUTO: 4.49 M/UL (ref 4.35–5.65)
SODIUM SERPL-SCNC: 132 MMOL/L (ref 136–145)
WBC # BLD AUTO: 6.8 K/UL (ref 4.6–13.2)

## 2023-07-31 PROCEDURE — 80048 BASIC METABOLIC PNL TOTAL CA: CPT

## 2023-07-31 PROCEDURE — 85025 COMPLETE CBC W/AUTO DIFF WBC: CPT

## 2023-07-31 PROCEDURE — 99283 EMERGENCY DEPT VISIT LOW MDM: CPT

## 2023-07-31 RX ORDER — LOSARTAN POTASSIUM 25 MG/1
50 TABLET ORAL DAILY
Qty: 30 TABLET | Refills: 0 | Status: SHIPPED | OUTPATIENT
Start: 2023-07-31

## 2023-07-31 ASSESSMENT — PAIN - FUNCTIONAL ASSESSMENT: PAIN_FUNCTIONAL_ASSESSMENT: NONE - DENIES PAIN

## 2023-07-31 NOTE — ED TRIAGE NOTES
Pt states his BP has been high and not regulated with his current medication. No complaints just concerned. Problems has been going on for a while. Unsure when his cardiology F/U is. No Cp, SOB, no headache.

## 2023-08-01 NOTE — ED PROVIDER NOTES
Take 1 tablet by mouth dailyHistorical Med      aspirin 81 MG EC tablet 81 mgHistorical Med      atorvastatin (LIPITOR) 80 MG tablet 80 mgHistorical Med      vitamin D (CHOLECALCIFEROL) 25 MCG (1000 UT) TABS tablet 50 mcgHistorical Med      clopidogrel (PLAVIX) 75 MG tablet 75 mgHistorical Med      HYDROcodone-acetaminophen (NORCO) 5-325 MG per tablet TAKE 1 TABLET BY MOUTH TWICE A DAY AS NEEDED FOR POST-OPERATIVE STERNAL PAIN GREATER THAN 7 OUF OF 10 ON THE PAIN RATING SCALE; *USE AFTER TYLENOL* **DO NOT EXCEED 4000MG OF ACETAMINOPHEN FROM ALL SOURCES CONTAINING ACETAMINOPHEN IN A 24-HOUR PERIOD**Hi storical Med      metFORMIN (GLUCOPHAGE) 500 MG tablet Take 1,000 mg by mouth 2 times daily (with meals)Historical Med      metoprolol tartrate (LOPRESSOR) 50 MG tablet 50 mgHistorical Med             DISCONTINUED MEDICATIONS:  Discharge Medication List as of 7/31/2023  9:47 PM          PATIENT REFERRED TO:  Follow Up with:  No follow-up provider specified. Dragon Disclaimer     Please note that this dictation was completed with Purplle, the computer voice recognition software. Quite often unanticipated grammatical, syntax, homophones, and other interpretive errors are inadvertently transcribed by the computer software. Please disregard these errors. Please excuse any errors that have escaped final proofreading. Julien Vickers MD am the primary clinician of record.   Tony Vera MD  (Electronically signed)            Tip Emery MD  07/31/23 [id number] Jose Lance MD  08/01/23 2353

## 2023-12-28 ENCOUNTER — HOSPITAL ENCOUNTER (EMERGENCY)
Facility: HOSPITAL | Age: 63
Discharge: HOME | End: 2023-12-28
Attending: EMERGENCY MEDICINE
Payer: OTHER GOVERNMENT

## 2023-12-28 VITALS
HEIGHT: 73 IN | SYSTOLIC BLOOD PRESSURE: 112 MMHG | OXYGEN SATURATION: 97 % | BODY MASS INDEX: 25.71 KG/M2 | RESPIRATION RATE: 16 BRPM | WEIGHT: 194 LBS | DIASTOLIC BLOOD PRESSURE: 65 MMHG | HEART RATE: 86 BPM | TEMPERATURE: 102.4 F

## 2023-12-28 DIAGNOSIS — R50.9 FEVER, UNSPECIFIED FEVER CAUSE: ICD-10-CM

## 2023-12-28 DIAGNOSIS — B34.9 VIRAL ILLNESS: Primary | ICD-10-CM

## 2023-12-28 LAB
FLUAV RNA SPEC QL NAA+PROBE: NOT DETECTED
FLUBV RNA SPEC QL NAA+PROBE: NOT DETECTED
SARS-COV-2 RNA RESP QL NAA+PROBE: NOT DETECTED

## 2023-12-28 PROCEDURE — 6370000000 HC RX 637 (ALT 250 FOR IP): Performed by: EMERGENCY MEDICINE

## 2023-12-28 PROCEDURE — 99283 EMERGENCY DEPT VISIT LOW MDM: CPT

## 2023-12-28 PROCEDURE — 87636 SARSCOV2 & INF A&B AMP PRB: CPT

## 2023-12-28 RX ORDER — IBUPROFEN 400 MG/1
800 TABLET ORAL
Status: COMPLETED | OUTPATIENT
Start: 2023-12-28 | End: 2023-12-28

## 2023-12-28 RX ORDER — ACETAMINOPHEN 325 MG/1
650 TABLET ORAL
Status: COMPLETED | OUTPATIENT
Start: 2023-12-28 | End: 2023-12-28

## 2023-12-28 RX ADMIN — IBUPROFEN 800 MG: 400 TABLET, FILM COATED ORAL at 08:20

## 2023-12-28 RX ADMIN — ACETAMINOPHEN 650 MG: 325 TABLET ORAL at 08:20

## 2023-12-28 NOTE — ED TRIAGE NOTES
Patient reports that he has a sore throat body aches and headache and every one in his home has the flu

## 2023-12-28 NOTE — ED PROVIDER NOTES
packs/day: 0.00     Types: Cigarettes     Quit date: 2023     Years since quittin.9   Substance Use Topics    Alcohol use: No     Comment: none x 3 weeks    Drug use: Yes     Types: Marijuana Arzella Skill)       MEDICATIONS:  Current Facility-Administered Medications   Medication Dose Route Frequency Provider Last Rate Last Admin    acetaminophen (TYLENOL) tablet 650 mg  650 mg Oral NOW Abner Segal MD        benzocaine-menthol (CEPACOL SORE THROAT) lozenge 1 lozenge  1 lozenge Oral NOW Abner Seagl MD        ibuprofen (ADVIL;MOTRIN) tablet 800 mg  800 mg Oral NOW Abner Segal MD         Current Outpatient Medications   Medication Sig Dispense Refill    losartan (COZAAR) 25 MG tablet Take 2 tablets by mouth daily 30 tablet 0    cyclobenzaprine (FLEXERIL) 5 MG tablet Take 1 tablet by mouth 3 times daily as needed for Muscle spasms      Multiple Vitamins-Minerals (THERAPEUTIC MULTIVITAMIN-MINERALS) tablet Take 1 tablet by mouth daily      hydroCHLOROthiazide (HYDRODIURIL) 25 MG tablet Take 1 tablet by mouth daily      aspirin 81 MG EC tablet 81 mg      atorvastatin (LIPITOR) 80 MG tablet 80 mg      vitamin D (CHOLECALCIFEROL) 25 MCG (1000 UT) TABS tablet 50 mcg      clopidogrel (PLAVIX) 75 MG tablet 75 mg      HYDROcodone-acetaminophen (NORCO) 5-325 MG per tablet TAKE 1 TABLET BY MOUTH TWICE A DAY AS NEEDED FOR POST-OPERATIVE STERNAL PAIN GREATER THAN 7 OUF OF 10 ON THE PAIN RATING SCALE; *USE AFTER TYLENOL* **DO NOT EXCEED 4000MG OF ACETAMINOPHEN FROM ALL SOURCES CONTAINING ACETAMINOPHEN IN A 24-HOUR PERIOD**      metFORMIN (GLUCOPHAGE) 500 MG tablet Take 1,000 mg by mouth 2 times daily (with meals)      metoprolol tartrate (LOPRESSOR) 50 MG tablet 50 mg         ALLERGIES:  No Known Allergies    SOCIAL DETERMINANTS OF HEALTH:  Social Determinants of Health     Tobacco Use: Medium Risk (2023)    Patient History     Smoking Tobacco Use: Former     Smokeless Tobacco Use: Unknown     Passive

## 2023-12-28 NOTE — DISCHARGE INSTRUCTIONS
Follow-up with your primary care doctor. Drink plenty of water. Return to the ED for worsening symptoms or for other concerns. Tylenol Motrin may be taken over-the-counter for symptoms. You may also use any cough drop that you prefer.

## 2024-01-11 ENCOUNTER — APPOINTMENT (OUTPATIENT)
Facility: HOSPITAL | Age: 64
End: 2024-01-11
Payer: OTHER GOVERNMENT

## 2024-01-11 ENCOUNTER — HOSPITAL ENCOUNTER (INPATIENT)
Facility: HOSPITAL | Age: 64
LOS: 2 days | Discharge: HOME | End: 2024-01-13
Attending: HOSPITALIST | Admitting: HOSPITALIST
Payer: OTHER GOVERNMENT

## 2024-01-11 DIAGNOSIS — I25.10 CORONARY ARTERY DISEASE INVOLVING NATIVE CORONARY ARTERY OF NATIVE HEART WITHOUT ANGINA PECTORIS: Primary | ICD-10-CM

## 2024-01-11 DIAGNOSIS — R07.9 CHEST PAIN, UNSPECIFIED TYPE: ICD-10-CM

## 2024-01-11 DIAGNOSIS — R94.31 ABNORMAL EKG: ICD-10-CM

## 2024-01-11 DIAGNOSIS — Z95.1 S/P CABG X 3: ICD-10-CM

## 2024-01-11 PROBLEM — R91.8 LUNG INFILTRATE: Status: ACTIVE | Noted: 2024-01-11

## 2024-01-11 LAB
ALBUMIN SERPL-MCNC: 3.5 G/DL (ref 3.4–5)
ALBUMIN/GLOB SERPL: 1 (ref 0.8–1.7)
ALP SERPL-CCNC: 92 U/L (ref 45–117)
ALT SERPL-CCNC: 29 U/L (ref 16–61)
ANION GAP SERPL CALC-SCNC: 5 MMOL/L (ref 3–18)
AST SERPL-CCNC: 21 U/L (ref 10–38)
BASOPHILS # BLD: 0 K/UL (ref 0–0.1)
BASOPHILS NFR BLD: 0 % (ref 0–2)
BILIRUB SERPL-MCNC: 0.5 MG/DL (ref 0.2–1)
BUN SERPL-MCNC: 7 MG/DL (ref 7–18)
BUN/CREAT SERPL: 9 (ref 12–20)
CALCIUM SERPL-MCNC: 9.1 MG/DL (ref 8.5–10.1)
CHLORIDE SERPL-SCNC: 105 MMOL/L (ref 100–111)
CO2 SERPL-SCNC: 26 MMOL/L (ref 21–32)
CREAT SERPL-MCNC: 0.74 MG/DL (ref 0.6–1.3)
D DIMER PPP FEU-MCNC: 0.31 UG/ML(FEU)
DIFFERENTIAL METHOD BLD: ABNORMAL
EOSINOPHIL # BLD: 0.1 K/UL (ref 0–0.4)
EOSINOPHIL NFR BLD: 2 % (ref 0–5)
ERYTHROCYTE [DISTWIDTH] IN BLOOD BY AUTOMATED COUNT: 13.3 % (ref 11.6–14.5)
GLOBULIN SER CALC-MCNC: 3.4 G/DL (ref 2–4)
GLUCOSE BLD STRIP.AUTO-MCNC: 218 MG/DL (ref 70–110)
GLUCOSE SERPL-MCNC: 110 MG/DL (ref 74–99)
HCT VFR BLD AUTO: 36.5 % (ref 36–48)
HGB BLD-MCNC: 13.2 G/DL (ref 13–16)
IMM GRANULOCYTES # BLD AUTO: 0 K/UL (ref 0–0.04)
IMM GRANULOCYTES NFR BLD AUTO: 0 % (ref 0–0.5)
LYMPHOCYTES # BLD: 2.1 K/UL (ref 0.9–3.6)
LYMPHOCYTES NFR BLD: 33 % (ref 21–52)
MCH RBC QN AUTO: 30.8 PG (ref 24–34)
MCHC RBC AUTO-ENTMCNC: 36.2 G/DL (ref 31–37)
MCV RBC AUTO: 85.1 FL (ref 78–100)
MONOCYTES # BLD: 0.5 K/UL (ref 0.05–1.2)
MONOCYTES NFR BLD: 8 % (ref 3–10)
NEUTS SEG # BLD: 3.6 K/UL (ref 1.8–8)
NEUTS SEG NFR BLD: 56 % (ref 40–73)
NRBC # BLD: 0 K/UL (ref 0–0.01)
NRBC BLD-RTO: 0 PER 100 WBC
PLATELET # BLD AUTO: 225 K/UL (ref 135–420)
PMV BLD AUTO: 8.8 FL (ref 9.2–11.8)
POTASSIUM SERPL-SCNC: 3.8 MMOL/L (ref 3.5–5.5)
PROT SERPL-MCNC: 6.9 G/DL (ref 6.4–8.2)
RBC # BLD AUTO: 4.29 M/UL (ref 4.35–5.65)
SODIUM SERPL-SCNC: 136 MMOL/L (ref 136–145)
TROPONIN I SERPL HS-MCNC: 13 NG/L (ref 0–78)
TROPONIN I SERPL HS-MCNC: 13 NG/L (ref 0–78)
WBC # BLD AUTO: 6.4 K/UL (ref 4.6–13.2)

## 2024-01-11 PROCEDURE — 71045 X-RAY EXAM CHEST 1 VIEW: CPT

## 2024-01-11 PROCEDURE — 85379 FIBRIN DEGRADATION QUANT: CPT

## 2024-01-11 PROCEDURE — 96374 THER/PROPH/DIAG INJ IV PUSH: CPT

## 2024-01-11 PROCEDURE — 6360000002 HC RX W HCPCS: Performed by: HOSPITALIST

## 2024-01-11 PROCEDURE — 1100000003 HC PRIVATE W/ TELEMETRY

## 2024-01-11 PROCEDURE — 82962 GLUCOSE BLOOD TEST: CPT

## 2024-01-11 PROCEDURE — 85025 COMPLETE CBC W/AUTO DIFF WBC: CPT

## 2024-01-11 PROCEDURE — 6370000000 HC RX 637 (ALT 250 FOR IP): Performed by: HOSPITALIST

## 2024-01-11 PROCEDURE — 80053 COMPREHEN METABOLIC PANEL: CPT

## 2024-01-11 PROCEDURE — 84484 ASSAY OF TROPONIN QUANT: CPT

## 2024-01-11 PROCEDURE — 93005 ELECTROCARDIOGRAM TRACING: CPT | Performed by: HOSPITALIST

## 2024-01-11 PROCEDURE — 96375 TX/PRO/DX INJ NEW DRUG ADDON: CPT

## 2024-01-11 PROCEDURE — 2580000003 HC RX 258: Performed by: HOSPITALIST

## 2024-01-11 PROCEDURE — 6370000000 HC RX 637 (ALT 250 FOR IP): Performed by: NURSE PRACTITIONER

## 2024-01-11 PROCEDURE — 83036 HEMOGLOBIN GLYCOSYLATED A1C: CPT

## 2024-01-11 PROCEDURE — 99285 EMERGENCY DEPT VISIT HI MDM: CPT

## 2024-01-11 PROCEDURE — 93005 ELECTROCARDIOGRAM TRACING: CPT | Performed by: NURSE PRACTITIONER

## 2024-01-11 PROCEDURE — 6360000002 HC RX W HCPCS: Performed by: NURSE PRACTITIONER

## 2024-01-11 PROCEDURE — 94761 N-INVAS EAR/PLS OXIMETRY MLT: CPT

## 2024-01-11 RX ORDER — SODIUM CHLORIDE 0.9 % (FLUSH) 0.9 %
5-40 SYRINGE (ML) INJECTION EVERY 12 HOURS SCHEDULED
Status: DISCONTINUED | OUTPATIENT
Start: 2024-01-11 | End: 2024-01-13 | Stop reason: HOSPADM

## 2024-01-11 RX ORDER — ASPIRIN 81 MG/1
324 TABLET, CHEWABLE ORAL ONCE
Status: COMPLETED | OUTPATIENT
Start: 2024-01-11 | End: 2024-01-11

## 2024-01-11 RX ORDER — ACETAMINOPHEN 650 MG/1
650 SUPPOSITORY RECTAL EVERY 6 HOURS PRN
Status: DISCONTINUED | OUTPATIENT
Start: 2024-01-11 | End: 2024-01-13 | Stop reason: HOSPADM

## 2024-01-11 RX ORDER — ATORVASTATIN CALCIUM 20 MG/1
80 TABLET, FILM COATED ORAL NIGHTLY
Status: DISCONTINUED | OUTPATIENT
Start: 2024-01-11 | End: 2024-01-13 | Stop reason: HOSPADM

## 2024-01-11 RX ORDER — DEXTROSE MONOHYDRATE 100 MG/ML
INJECTION, SOLUTION INTRAVENOUS CONTINUOUS PRN
Status: DISCONTINUED | OUTPATIENT
Start: 2024-01-11 | End: 2024-01-13 | Stop reason: HOSPADM

## 2024-01-11 RX ORDER — POLYETHYLENE GLYCOL 3350 17 G/17G
17 POWDER, FOR SOLUTION ORAL DAILY PRN
Status: DISCONTINUED | OUTPATIENT
Start: 2024-01-11 | End: 2024-01-13 | Stop reason: HOSPADM

## 2024-01-11 RX ORDER — ENOXAPARIN SODIUM 100 MG/ML
40 INJECTION SUBCUTANEOUS DAILY
Status: DISCONTINUED | OUTPATIENT
Start: 2024-01-11 | End: 2024-01-13 | Stop reason: HOSPADM

## 2024-01-11 RX ORDER — ONDANSETRON 2 MG/ML
4 INJECTION INTRAMUSCULAR; INTRAVENOUS EVERY 6 HOURS PRN
Status: DISCONTINUED | OUTPATIENT
Start: 2024-01-11 | End: 2024-01-13 | Stop reason: HOSPADM

## 2024-01-11 RX ORDER — ONDANSETRON 2 MG/ML
4 INJECTION INTRAMUSCULAR; INTRAVENOUS ONCE
Status: COMPLETED | OUTPATIENT
Start: 2024-01-11 | End: 2024-01-11

## 2024-01-11 RX ORDER — GLUCAGON 1 MG/ML
1 KIT INJECTION PRN
Status: DISCONTINUED | OUTPATIENT
Start: 2024-01-11 | End: 2024-01-13 | Stop reason: HOSPADM

## 2024-01-11 RX ORDER — SODIUM CHLORIDE 0.9 % (FLUSH) 0.9 %
5-40 SYRINGE (ML) INJECTION PRN
Status: DISCONTINUED | OUTPATIENT
Start: 2024-01-11 | End: 2024-01-13 | Stop reason: HOSPADM

## 2024-01-11 RX ORDER — MORPHINE SULFATE 2 MG/ML
1 INJECTION, SOLUTION INTRAMUSCULAR; INTRAVENOUS EVERY 4 HOURS PRN
Status: DISCONTINUED | OUTPATIENT
Start: 2024-01-11 | End: 2024-01-13 | Stop reason: HOSPADM

## 2024-01-11 RX ORDER — SODIUM CHLORIDE 9 MG/ML
INJECTION, SOLUTION INTRAVENOUS PRN
Status: DISCONTINUED | OUTPATIENT
Start: 2024-01-11 | End: 2024-01-13 | Stop reason: HOSPADM

## 2024-01-11 RX ORDER — ACETAMINOPHEN 325 MG/1
650 TABLET ORAL EVERY 6 HOURS PRN
Status: DISCONTINUED | OUTPATIENT
Start: 2024-01-11 | End: 2024-01-13 | Stop reason: HOSPADM

## 2024-01-11 RX ORDER — MORPHINE SULFATE 4 MG/ML
4 INJECTION, SOLUTION INTRAMUSCULAR; INTRAVENOUS
Status: COMPLETED | OUTPATIENT
Start: 2024-01-11 | End: 2024-01-11

## 2024-01-11 RX ORDER — INSULIN LISPRO 100 [IU]/ML
0-8 INJECTION, SOLUTION INTRAVENOUS; SUBCUTANEOUS
Status: DISCONTINUED | OUTPATIENT
Start: 2024-01-11 | End: 2024-01-13 | Stop reason: HOSPADM

## 2024-01-11 RX ORDER — NITROGLYCERIN 0.4 MG/1
0.4 TABLET SUBLINGUAL
Status: COMPLETED | OUTPATIENT
Start: 2024-01-11 | End: 2024-01-11

## 2024-01-11 RX ORDER — INSULIN LISPRO 100 [IU]/ML
0-4 INJECTION, SOLUTION INTRAVENOUS; SUBCUTANEOUS NIGHTLY
Status: DISCONTINUED | OUTPATIENT
Start: 2024-01-11 | End: 2024-01-13 | Stop reason: HOSPADM

## 2024-01-11 RX ORDER — ASPIRIN 81 MG/1
81 TABLET ORAL DAILY
Status: DISCONTINUED | OUTPATIENT
Start: 2024-01-11 | End: 2024-01-13 | Stop reason: HOSPADM

## 2024-01-11 RX ORDER — ONDANSETRON 4 MG/1
4 TABLET, ORALLY DISINTEGRATING ORAL EVERY 8 HOURS PRN
Status: DISCONTINUED | OUTPATIENT
Start: 2024-01-11 | End: 2024-01-13 | Stop reason: HOSPADM

## 2024-01-11 RX ORDER — NITROGLYCERIN 0.4 MG/1
0.4 TABLET SUBLINGUAL EVERY 5 MIN PRN
Status: DISCONTINUED | OUTPATIENT
Start: 2024-01-11 | End: 2024-01-13 | Stop reason: HOSPADM

## 2024-01-11 RX ADMIN — AZITHROMYCIN MONOHYDRATE 500 MG: 500 INJECTION, POWDER, LYOPHILIZED, FOR SOLUTION INTRAVENOUS at 22:18

## 2024-01-11 RX ADMIN — ONDANSETRON 4 MG: 2 INJECTION INTRAMUSCULAR; INTRAVENOUS at 17:00

## 2024-01-11 RX ADMIN — ATORVASTATIN CALCIUM 80 MG: 20 TABLET, FILM COATED ORAL at 20:42

## 2024-01-11 RX ADMIN — MORPHINE SULFATE 4 MG: 4 INJECTION, SOLUTION INTRAMUSCULAR; INTRAVENOUS at 17:00

## 2024-01-11 RX ADMIN — ASPIRIN 81 MG: 81 TABLET ORAL at 20:42

## 2024-01-11 RX ADMIN — CEFTRIAXONE 1000 MG: 1 INJECTION, POWDER, FOR SOLUTION INTRAMUSCULAR; INTRAVENOUS at 20:41

## 2024-01-11 RX ADMIN — ENOXAPARIN SODIUM 40 MG: 100 INJECTION SUBCUTANEOUS at 20:42

## 2024-01-11 RX ADMIN — NITROGLYCERIN 0.4 MG: 0.4 TABLET, ORALLY DISINTEGRATING SUBLINGUAL at 16:20

## 2024-01-11 RX ADMIN — ASPIRIN 324 MG: 81 TABLET, CHEWABLE ORAL at 16:20

## 2024-01-11 ASSESSMENT — PAIN SCALES - GENERAL
PAINLEVEL_OUTOF10: 0
PAINLEVEL_OUTOF10: 0
PAINLEVEL_OUTOF10: 6
PAINLEVEL_OUTOF10: 0
PAINLEVEL_OUTOF10: 5

## 2024-01-11 ASSESSMENT — PAIN DESCRIPTION - LOCATION: LOCATION: CHEST

## 2024-01-11 ASSESSMENT — HEART SCORE: ECG: 1

## 2024-01-11 ASSESSMENT — PAIN - FUNCTIONAL ASSESSMENT: PAIN_FUNCTIONAL_ASSESSMENT: 0-10

## 2024-01-11 NOTE — CONSULTS
[facility]      Patient: Juan Suárez MRN: [id number]  SSN: [id number]    YOB: 1960  Age: 63 y.o.  Sex: male    Date of Consultation: 01/11/2024  Referring Physician: Lynn Duke NP  Reason for Consultation: Chest pain    Chief complain: Chest pain     HPI: 63-year-old gentleman came to emergency with complaining of left-sided chest discomfort.  Patient is complaining of left-sided dull chest discomfort for some time.  On day of arrival discomfort was continuous and not relieving so he decided to come to the emergency room.  Complaining of shortness of breath on exertion.  Chest pain was not associated with any the symptoms and it was nonradiating.  Denies any dizziness, palpitation, presyncope or syncope.  Denies any smoking or alcohol abuse.  He quit smoking last year.  Cardiology consult called for evaluation of chest pain.    Past Medical History:   Diagnosis Date    Anginal pain (HCC)     Diabetes (HCC)     new onset 2/2014    Essential hypertension, benign 2/28/2014    High cholesterol     Hyperosmolar (nonketotic) coma (HCC) 2/27/2014    Hypertension     Hypokalemia 2/28/2014    Other idiopathic peripheral autonomic neuropathy 1/21/2023    Peripheral neuropathy 1/21/2023    Plantar fascial fibromatosis of left foot 1/21/2023    S/P CABG x 3 1/16/2023    CABG x3 - LIMA-LAD, rSVG-ramus, rSVG-PDA -Dr. Bolaños, 1/16/23    STEMI (ST elevation myocardial infarction) ([facility]) 1/15/2023    Added automatically from request for surgery [id number]    Type II or unspecified type diabetes mellitus without mention of complication, uncontrolled 2/28/2014     No past surgical history on file.  Current Facility-Administered Medications   Medication Dose Route Frequency    sodium chloride flush 0.9 % injection 5-40 mL  5-40 mL IntraVENous 2 times per day    sodium chloride flush 0.9 % injection 5-40 mL  5-40 mL IntraVENous PRN    0.9 % sodium chloride infusion   IntraVENous PRN    enoxaparin (LOVENOX)  Positive chest pain, shortness of breath on exertion, palpitations, pnd, or orthopnea.   Resp: No cough, hemoptysis, wheezing and  dyspnea  GI: No nausea, vomiting, diarrhea, constipation, melena or hematochezia.   : No urinary obstruction, dysuria or hematuria.   Derm: No rash, new skin lesion or pruritis.   Musc/skeletal:  positive bone or joint complains.  Vasc: No edema, cyanosis or claudication.   Endo: No heat/cold intolerance, no polyuria,polydipsia or polyphagia.   Neuro: No unilateral weakness, numbness, tingling. No seizures.   Heme: No easy bruising or bleeding.      Physical:   Patient Vitals for the past 6 hrs:   Temp Pulse Resp BP SpO2   01/11/24 1712 -- 56 13 122/73 99 %   01/11/24 1641 -- 58 14 127/73 98 %   01/11/24 1626 -- 60 12 120/76 --   01/11/24 1559 -- 60 15 134/81 98 %   01/11/24 1549 -- 57 14 139/81 98 %   01/11/24 1529 -- 59 13 135/85 --   01/11/24 1519 -- 60 15 (!) 145/83 --   01/11/24 1457 -- 59 14 (!) 140/80 100 %   01/11/24 1447 -- 60 13 (!) 149/82 100 %   01/11/24 1423 97.8 °F (36.6 °C) 63 18 (!) 152/87 100 %         Exam:   General Appearance: Comfortable, not using accessory muscles of respiration.  HEENT: RONNIE.   HEAD: Atraumatic  NECK: No JVD, no thyroidomeglay. CAROTIDS: no bruit  LUNGS: Clear bilaterally.   HEART: S1+S2 audible, no murmur, no pericardial rub.     ABD: Non-tender, BS Audible    EXT: No edema, and no cyanosis.  VASCULAR EXAM: Pulses are intact.    PSYCHIATRIC EXAM: Mood is appropriate.  MUSCULOSKELETAL: Grossly no joint deformity.  NEUROLOGICAL: AAO times 3, Motor and sensory sytem intact      Review of Data:   LABS:   Lab Results   Component Value Date/Time    WBC 6.4 01/11/2024 02:50 PM    HGB 13.2 01/11/2024 02:50 PM    HCT 36.5 01/11/2024 02:50 PM     01/11/2024 02:50 PM     Lab Results   Component Value Date/Time     01/11/2024 02:50 PM    K 3.8 01/11/2024 02:50 PM     01/11/2024 02:50 PM    CO2 26 01/11/2024 02:50 PM    BUN 7 01/11/2024

## 2024-01-11 NOTE — ED PROVIDER NOTES
[facility] EMERGENCY DEPT  EMERGENCY DEPARTMENT ENCOUNTER       Pt Name: Juan Suárez  MRN: [id number]  Birthdate 1960  Date of evaluation: 1/11/2024  PCP: Jonah Lake MD  Note Started: 6:57 PM 1/11/24     CHIEF COMPLAINT       Chief Complaint   Patient presents with    Chest Pain        HISTORY OF PRESENT ILLNESS: 1 or more elements      History From: Patient  HPI Limitations: None  Chronic Conditions: DM, hypertension, STEMI, high cholesterol, diverticulosis, GERD, peripheral neuropathy, vitamin D deficiency  Social Determinants affecting Dx or Tx: None     Juan Suárez is a 63 y.o. male with history of  DM, hypertension, STEMI, high cholesterol, diverticulosis, GERD, peripheral neuropathy, vitamin D deficiency who presents to ED c/o chest pain.  Patient had a triple bypass approximately 1 year ago.  Patient reports dull left-sided chest pain today that is constant.  Patient reports has had this pain before but was intermittent, patient describes pain as dull, without noted aggravating or alleviating factors, patient has not tried any medications, patient denies shortness of breath, dizziness, focal weakness or paresthesias, cough, nausea/vomiting.  Patient denies fever or chills, does report he had the flu over Xu.     Nursing Notes were all reviewed and agreed with or any disagreements were addressed in the HPI.    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 Bolaños, 1/16/23    STEMI (ST elevation myocardial  considerations with the patient. They agree with the plan of admission.      FINAL IMPRESSION     1. S/P CABG x 3    2. Chest pain, unspecified type            DISPOSITION/PLAN   DISPOSITION Admitted 01/11/2024 06:27:46 PM           PATIENT REFERRED TO:  No follow-up provider specified.       DISCHARGE MEDICATIONS:     Medication List        ASK your doctor about these medications      aspirin 81 MG EC tablet     atorvastatin 80 MG tablet  Commonly known as: LIPITOR     clopidogrel 75 MG tablet  Commonly known as: PLAVIX     cyclobenzaprine 5 MG tablet  Commonly known as: FLEXERIL     hydroCHLOROthiazide 25 MG tablet  Commonly known as: HYDRODIURIL     HYDROcodone-acetaminophen 5-325 MG per tablet  Commonly known as: NORCO     losartan 25 MG tablet  Commonly known as: COZAAR  Take 2 tablets by mouth daily     metFORMIN 500 MG tablet  Commonly known as: GLUCOPHAGE     metoprolol tartrate 50 MG tablet  Commonly known as: LOPRESSOR     therapeutic multivitamin-minerals tablet     vitamin D 25 MCG (1000 UT) Tabs tablet  Commonly known as: CHOLECALCIFEROL                       I am the Primary Clinician of Record.       (Please note that parts of this dictation were completed with voice recognition software. Quite often unanticipated grammatical, syntax, homophones, and other interpretive errors are inadvertently transcribed by the computer software. Please disregards these errors. Please excuse any errors that have escaped final proofreading.)      Leilani Bailey, APRN - NP  01/11/24 [id number]

## 2024-01-11 NOTE — ED TRIAGE NOTES
Dull L sided chest pain starting at 1030, has since not relieved. Tingling noted to L finger tips, no SOB/nausea/dizziness, +hx of heart attack and triple bypass surgery, +blood thinner use

## 2024-01-11 NOTE — H&P
History & Physical    Patient: Juan Suárez MRN: [id number]  CSN: [id number]    YOB: 1960  Age: 63 y.o.  Sex: male      DOA: 1/11/2024  Primary Care Provider:  Jonah Lake MD      Assessment/Plan     Active Hospital Problems    Diagnosis Date Noted    Chest pain [R07.9] 01/11/2024    Lung infiltrate [R91.8] 01/11/2024    High cholesterol [E78.00]     S/P CABG x 3 [Z95.1] 01/16/2023    Type 2 diabetes mellitus without complication, without long-term current use of insulin (HCC) [E11.9] 02/28/2014    Essential hypertension, benign [I10] 02/28/2014         Admit to tele   Chest pain, cbge   Ce negative   Cardiac monitor, ce trend need to r/o acs   Ekg: no st change at this point,   morphine /nitro prn for chest pain  Cardiology consult   Need cath vs stress test will defer to cardiology   Continue aspirin and lipitor, he is not sure if he is on plavix will continue till bring meds from family   HR 50s -will defer to cardiolgist for bbb   Echo   Uds       Right lower lobe mild acute infiltrate.   Will start abx for possible pna       DM type II , with complication,  ssi, diabetic diet , hypoglycemia protocol       HTN, accelerated  Continue home medication.     Full code   Please note that this dictation was completed with [employer], the [employer] voice recognition software.  Quite often unanticipated grammatical, syntax, homophones, and other interpretive errors are inadvertently transcribed by the computer software.  Please disregard these errors.  Please excuse any errors that have escaped final proofreading    Estimate  length of stay : 2-3 day    DVT : lovenox ppi proph  CC: chest pain        HPI:     Juan Suárez is a 63 y.o. male with hx cad, cabg, dm ,htn , hld presented to ER due Chest pain this am. He was given nitro and morphine per ems, chest pain resolved.  ce x1 negative. EKG 1st degree av block. Denies any slurred speech/headache/n/v/blurred vission/d/c/palpitation/gait    Glucose:   Lab Results   Component Value Date/Time    GLUCOSE 110 01/11/2024 02:50 PM      Last 3 Glucose:   Recent Labs     01/11/24  1450   GLUCOSE 110*      POC Glucose:   Lab Results   Component Value Date/Time    POCGLU 110 01/21/2023 11:35 AM      Last 3 POC Glucose: No results for input(s): \"POCGLU\" in the last 72 hours.   Last 3 CK, CKMB, Troponin: No results for input(s): \"CKTOTAL\", \"CKMB\", \"TROPONINI\" in the last 72 hours.   CBC:   Lab Results   Component Value Date/Time    WBC 6.4 01/11/2024 02:50 PM    RBC 4.29 01/11/2024 02:50 PM    HGB 13.2 01/11/2024 02:50 PM    HCT 36.5 01/11/2024 02:50 PM    MCV 85.1 01/11/2024 02:50 PM    MCH 30.8 01/11/2024 02:50 PM    MCHC 36.2 01/11/2024 02:50 PM    RDW 13.3 01/11/2024 02:50 PM     01/11/2024 02:50 PM    MPV 8.8 01/11/2024 02:50 PM      Last 3 CBC:   Recent Labs     01/11/24  1450   WBC 6.4   RBC 4.29*   HGB 13.2   HCT 36.5   MCV 85.1   MCH 30.8   MCHC 36.2   RDW 13.3      MPV 8.8*      WBC:   Lab Results   Component Value Date/Time    WBC 6.4 01/11/2024 02:50 PM      Last 3 WBC:   Recent Labs     01/11/24  1450   WBC 6.4      Platelets:   Lab Results   Component Value Date/Time     01/11/2024 02:50 PM      Last 3 Platelets:   Recent Labs     01/11/24  1450         Hemoglobin/Hematocrit:   Lab Results   Component Value Date/Time    HGB 13.2 01/11/2024 02:50 PM    HCT 36.5 01/11/2024 02:50 PM      Last 3 Hemoglobin/Hematocrit:   Recent Labs     01/11/24  1450   HGB 13.2   HCT 36.5      Hepatic Function Panel:   Lab Results   Component Value Date/Time    ALKPHOS 92 01/11/2024 02:50 PM    ALKPHOS 53 01/17/2023 06:30 AM    ALT 29 01/11/2024 02:50 PM    AST 21 01/11/2024 02:50 PM    PROT 6.9 01/11/2024 02:50 PM    BILITOT 0.5 01/11/2024 02:50 PM    BILIDIR 0.4 01/17/2023 06:30 AM    LABALBU 3.5 01/11/2024 02:50 PM      Last 3 Hepatic Function Panel:   Recent Labs     01/11/24  1450   ALKPHOS 92   ALT 29   AST 21   PROT 6.9   BILITOT 0.5

## 2024-01-12 ENCOUNTER — APPOINTMENT (OUTPATIENT)
Facility: HOSPITAL | Age: 64
End: 2024-01-12
Payer: OTHER GOVERNMENT

## 2024-01-12 ENCOUNTER — HOSPITAL ENCOUNTER (INPATIENT)
Facility: HOSPITAL | Age: 64
End: 2024-01-12
Payer: OTHER GOVERNMENT

## 2024-01-12 ENCOUNTER — APPOINTMENT (OUTPATIENT)
Facility: HOSPITAL | Age: 64
End: 2024-01-12
Attending: HOSPITALIST
Payer: OTHER GOVERNMENT

## 2024-01-12 LAB
AMPHET UR QL SCN: NEGATIVE
ANION GAP SERPL CALC-SCNC: 6 MMOL/L (ref 3–18)
BARBITURATES UR QL SCN: NEGATIVE
BASOPHILS # BLD: 0 K/UL (ref 0–0.1)
BASOPHILS NFR BLD: 0 % (ref 0–2)
BENZODIAZ UR QL: NEGATIVE
BUN SERPL-MCNC: 8 MG/DL (ref 7–18)
BUN/CREAT SERPL: 11 (ref 12–20)
CALCIUM SERPL-MCNC: 9.3 MG/DL (ref 8.5–10.1)
CANNABINOIDS UR QL SCN: NEGATIVE
CHLORIDE SERPL-SCNC: 106 MMOL/L (ref 100–111)
CO2 SERPL-SCNC: 28 MMOL/L (ref 21–32)
COCAINE UR QL SCN: NEGATIVE
CREAT SERPL-MCNC: 0.72 MG/DL (ref 0.6–1.3)
DIFFERENTIAL METHOD BLD: ABNORMAL
ECHO AO ARCH DIAM: 2.1 CM
ECHO AO ASC DIAM: 3.6 CM
ECHO AO ASCENDING AORTA INDEX: 1.69 CM/M2
ECHO AO ROOT DIAM: 3.3 CM
ECHO AO ROOT INDEX: 1.55 CM/M2
ECHO AV AREA PEAK VELOCITY: 4.5 CM2
ECHO AV AREA VTI: 4.6 CM2
ECHO AV AREA/BSA PEAK VELOCITY: 2.1 CM2/M2
ECHO AV AREA/BSA VTI: 2.2 CM2/M2
ECHO AV MEAN GRADIENT: 2 MMHG
ECHO AV MEAN VELOCITY: 0.7 M/S
ECHO AV PEAK GRADIENT: 4 MMHG
ECHO AV PEAK VELOCITY: 1 M/S
ECHO AV VELOCITY RATIO: 1
ECHO AV VTI: 23.3 CM
ECHO BSA: 2.13 M2
ECHO BSA: 2.13 M2
ECHO IVC PROX: 1.5 CM
ECHO LA DIAMETER INDEX: 1.69 CM/M2
ECHO LA DIAMETER: 3.6 CM
ECHO LA TO AORTIC ROOT RATIO: 1.09
ECHO LA VOL A-L A2C: 48 ML (ref 18–58)
ECHO LA VOL A-L A4C: 60 ML (ref 18–58)
ECHO LA VOL BP: 49 ML (ref 18–58)
ECHO LA VOL MOD A2C: 46 ML (ref 18–58)
ECHO LA VOL MOD A4C: 52 ML (ref 18–58)
ECHO LA VOL/BSA BIPLANE: 23 ML/M2 (ref 16–34)
ECHO LA VOLUME AREA LENGTH: 54 ML
ECHO LA VOLUME INDEX A-L A2C: 23 ML/M2 (ref 16–34)
ECHO LA VOLUME INDEX A-L A4C: 28 ML/M2 (ref 16–34)
ECHO LA VOLUME INDEX AREA LENGTH: 25 ML/M2 (ref 16–34)
ECHO LA VOLUME INDEX MOD A2C: 22 ML/M2 (ref 16–34)
ECHO LA VOLUME INDEX MOD A4C: 24 ML/M2 (ref 16–34)
ECHO LV E' LATERAL VELOCITY: 12 CM/S
ECHO LV E' SEPTAL VELOCITY: 7 CM/S
ECHO LV EDV A2C: 124 ML
ECHO LV EDV A4C: 103 ML
ECHO LV EDV BP: 113 ML (ref 67–155)
ECHO LV EDV INDEX A4C: 48 ML/M2
ECHO LV EDV INDEX BP: 53 ML/M2
ECHO LV EDV NDEX A2C: 58 ML/M2
ECHO LV EJECTION FRACTION A2C: 61 %
ECHO LV EJECTION FRACTION A4C: 69 %
ECHO LV EJECTION FRACTION BIPLANE: 65 % (ref 55–100)
ECHO LV ESV A2C: 49 ML
ECHO LV ESV A4C: 32 ML
ECHO LV ESV BP: 40 ML (ref 22–58)
ECHO LV ESV INDEX A2C: 23 ML/M2
ECHO LV ESV INDEX A4C: 15 ML/M2
ECHO LV ESV INDEX BP: 19 ML/M2
ECHO LV FRACTIONAL SHORTENING: 33 % (ref 28–44)
ECHO LV INTERNAL DIMENSION DIASTOLE INDEX: 2.39 CM/M2
ECHO LV INTERNAL DIMENSION DIASTOLIC: 5.1 CM (ref 4.2–5.9)
ECHO LV INTERNAL DIMENSION SYSTOLIC INDEX: 1.6 CM/M2
ECHO LV INTERNAL DIMENSION SYSTOLIC: 3.4 CM
ECHO LV IVSD: 1 CM (ref 0.6–1)
ECHO LV MASS 2D: 188 G (ref 88–224)
ECHO LV MASS INDEX 2D: 88.3 G/M2 (ref 49–115)
ECHO LV POSTERIOR WALL DIASTOLIC: 1 CM (ref 0.6–1)
ECHO LV RELATIVE WALL THICKNESS RATIO: 0.39
ECHO LVOT AREA: 4.2 CM2
ECHO LVOT AV VTI INDEX: 1.06
ECHO LVOT DIAM: 2.3 CM
ECHO LVOT MEAN GRADIENT: 2 MMHG
ECHO LVOT PEAK GRADIENT: 4 MMHG
ECHO LVOT PEAK VELOCITY: 1 M/S
ECHO LVOT STROKE VOLUME INDEX: 48.4 ML/M2
ECHO LVOT SV: 103 ML
ECHO LVOT VTI: 24.8 CM
ECHO MV A VELOCITY: 0.72 M/S
ECHO MV E DECELERATION TIME (DT): 209.2 MS
ECHO MV E VELOCITY: 0.67 M/S
ECHO MV E/A RATIO: 0.93
ECHO MV E/E' LATERAL: 5.58
ECHO MV E/E' RATIO (AVERAGED): 7.58
ECHO RA END SYSTOLIC VOLUME APICAL 4 CHAMBER INDEX BSA: 20 ML/M2
ECHO RA VOLUME: 43 ML
ECHO RV INTERNAL DIMENSION: 3.5 CM
ECHO RV TAPSE: 1.7 CM (ref 1.7–?)
EKG ATRIAL RATE: 56 BPM
EKG ATRIAL RATE: 66 BPM
EKG DIAGNOSIS: NORMAL
EKG DIAGNOSIS: NORMAL
EKG P AXIS: 28 DEGREES
EKG P AXIS: 40 DEGREES
EKG P-R INTERVAL: 274 MS
EKG P-R INTERVAL: 288 MS
EKG Q-T INTERVAL: 380 MS
EKG Q-T INTERVAL: 412 MS
EKG QRS DURATION: 100 MS
EKG QRS DURATION: 102 MS
EKG QTC CALCULATION (BAZETT): 397 MS
EKG QTC CALCULATION (BAZETT): 398 MS
EKG R AXIS: 0 DEGREES
EKG R AXIS: 4 DEGREES
EKG T AXIS: -27 DEGREES
EKG T AXIS: -28 DEGREES
EKG VENTRICULAR RATE: 56 BPM
EKG VENTRICULAR RATE: 66 BPM
EOSINOPHIL # BLD: 0.2 K/UL (ref 0–0.4)
EOSINOPHIL NFR BLD: 2 % (ref 0–5)
ERYTHROCYTE [DISTWIDTH] IN BLOOD BY AUTOMATED COUNT: 13.5 % (ref 11.6–14.5)
EST. AVERAGE GLUCOSE BLD GHB EST-MCNC: 117 MG/DL
GLUCOSE BLD STRIP.AUTO-MCNC: 111 MG/DL (ref 70–110)
GLUCOSE BLD STRIP.AUTO-MCNC: 163 MG/DL (ref 70–110)
GLUCOSE BLD STRIP.AUTO-MCNC: 172 MG/DL (ref 70–110)
GLUCOSE SERPL-MCNC: 96 MG/DL (ref 74–99)
HBA1C MFR BLD: 5.7 % (ref 4.2–5.6)
HCT VFR BLD AUTO: 38.4 % (ref 36–48)
HGB BLD-MCNC: 13.5 G/DL (ref 13–16)
IMM GRANULOCYTES # BLD AUTO: 0 K/UL (ref 0–0.04)
IMM GRANULOCYTES NFR BLD AUTO: 0 % (ref 0–0.5)
LYMPHOCYTES # BLD: 3.5 K/UL (ref 0.9–3.6)
LYMPHOCYTES NFR BLD: 47 % (ref 21–52)
Lab: ABNORMAL
MAGNESIUM SERPL-MCNC: 1.9 MG/DL (ref 1.6–2.6)
MCH RBC QN AUTO: 30.4 PG (ref 24–34)
MCHC RBC AUTO-ENTMCNC: 35.2 G/DL (ref 31–37)
MCV RBC AUTO: 86.5 FL (ref 78–100)
METHADONE UR QL: NEGATIVE
MONOCYTES # BLD: 0.7 K/UL (ref 0.05–1.2)
MONOCYTES NFR BLD: 9 % (ref 3–10)
NEUTS SEG # BLD: 3.2 K/UL (ref 1.8–8)
NEUTS SEG NFR BLD: 42 % (ref 40–73)
NRBC # BLD: 0 K/UL (ref 0–0.01)
NRBC BLD-RTO: 0 PER 100 WBC
NUC STRESS EJECTION FRACTION: 61 %
OPIATES UR QL: POSITIVE
PCP UR QL: NEGATIVE
PLATELET # BLD AUTO: 224 K/UL (ref 135–420)
PMV BLD AUTO: 8.8 FL (ref 9.2–11.8)
POTASSIUM SERPL-SCNC: 3.5 MMOL/L (ref 3.5–5.5)
RBC # BLD AUTO: 4.44 M/UL (ref 4.35–5.65)
SODIUM SERPL-SCNC: 140 MMOL/L (ref 136–145)
STRESS BASELINE DIAS BP: 92 MMHG
STRESS BASELINE HR: 57 BPM
STRESS BASELINE SYS BP: 159 MMHG
STRESS ESTIMATED WORKLOAD: 1 METS
STRESS PEAK DIAS BP: 92 MMHG
STRESS PEAK SYS BP: 159 MMHG
STRESS PERCENT HR ACHIEVED: 54 %
STRESS POST PEAK HR: 85 BPM
STRESS RATE PRESSURE PRODUCT: NORMAL BPM*MMHG
STRESS TARGET HR: 156 BPM
TID: 1.05
TROPONIN I SERPL HS-MCNC: 13 NG/L (ref 0–78)
TROPONIN I SERPL HS-MCNC: 15 NG/L (ref 0–78)
WBC # BLD AUTO: 7.6 K/UL (ref 4.6–13.2)

## 2024-01-12 PROCEDURE — 36415 COLL VENOUS BLD VENIPUNCTURE: CPT

## 2024-01-12 PROCEDURE — 6360000002 HC RX W HCPCS: Performed by: HOSPITALIST

## 2024-01-12 PROCEDURE — 6360000002 HC RX W HCPCS: Performed by: INTERNAL MEDICINE

## 2024-01-12 PROCEDURE — 93306 TTE W/DOPPLER COMPLETE: CPT

## 2024-01-12 PROCEDURE — 83735 ASSAY OF MAGNESIUM: CPT

## 2024-01-12 PROCEDURE — 3430000000 HC RX DIAGNOSTIC RADIOPHARMACEUTICAL: Performed by: INTERNAL MEDICINE

## 2024-01-12 PROCEDURE — 85025 COMPLETE CBC W/AUTO DIFF WBC: CPT

## 2024-01-12 PROCEDURE — 6370000000 HC RX 637 (ALT 250 FOR IP): Performed by: HOSPITALIST

## 2024-01-12 PROCEDURE — 80048 BASIC METABOLIC PNL TOTAL CA: CPT

## 2024-01-12 PROCEDURE — 84484 ASSAY OF TROPONIN QUANT: CPT

## 2024-01-12 PROCEDURE — 78452 HT MUSCLE IMAGE SPECT MULT: CPT

## 2024-01-12 PROCEDURE — 1100000003 HC PRIVATE W/ TELEMETRY

## 2024-01-12 PROCEDURE — A9500 TC99M SESTAMIBI: HCPCS | Performed by: INTERNAL MEDICINE

## 2024-01-12 PROCEDURE — 2580000003 HC RX 258: Performed by: HOSPITALIST

## 2024-01-12 PROCEDURE — 82962 GLUCOSE BLOOD TEST: CPT

## 2024-01-12 PROCEDURE — 93017 CV STRESS TEST TRACING ONLY: CPT

## 2024-01-12 PROCEDURE — 80307 DRUG TEST PRSMV CHEM ANLYZR: CPT

## 2024-01-12 RX ORDER — CLOPIDOGREL BISULFATE 75 MG/1
75 TABLET ORAL DAILY
Status: DISCONTINUED | OUTPATIENT
Start: 2024-01-12 | End: 2024-01-13 | Stop reason: HOSPADM

## 2024-01-12 RX ORDER — REGADENOSON 0.08 MG/ML
0.4 INJECTION, SOLUTION INTRAVENOUS
Status: COMPLETED | OUTPATIENT
Start: 2024-01-12 | End: 2024-01-12

## 2024-01-12 RX ORDER — LOSARTAN POTASSIUM 50 MG/1
50 TABLET ORAL DAILY
Status: DISCONTINUED | OUTPATIENT
Start: 2024-01-12 | End: 2024-01-13 | Stop reason: HOSPADM

## 2024-01-12 RX ORDER — METOPROLOL SUCCINATE 100 MG/1
200 TABLET, EXTENDED RELEASE ORAL DAILY
COMMUNITY

## 2024-01-12 RX ORDER — METOPROLOL SUCCINATE 100 MG/1
200 TABLET, EXTENDED RELEASE ORAL DAILY
Status: DISCONTINUED | OUTPATIENT
Start: 2024-01-12 | End: 2024-01-13 | Stop reason: HOSPADM

## 2024-01-12 RX ORDER — HYDROCHLOROTHIAZIDE 25 MG/1
25 TABLET ORAL DAILY
Status: DISCONTINUED | OUTPATIENT
Start: 2024-01-12 | End: 2024-01-13 | Stop reason: HOSPADM

## 2024-01-12 RX ORDER — TETRAKIS(2-METHOXYISOBUTYLISOCYANIDE)COPPER(I) TETRAFLUOROBORATE 1 MG/ML
34.9 INJECTION, POWDER, LYOPHILIZED, FOR SOLUTION INTRAVENOUS
Status: COMPLETED | OUTPATIENT
Start: 2024-01-12 | End: 2024-01-12

## 2024-01-12 RX ORDER — TETRAKIS(2-METHOXYISOBUTYLISOCYANIDE)COPPER(I) TETRAFLUOROBORATE 1 MG/ML
10.7 INJECTION, POWDER, LYOPHILIZED, FOR SOLUTION INTRAVENOUS
Status: COMPLETED | OUTPATIENT
Start: 2024-01-12 | End: 2024-01-12

## 2024-01-12 RX ADMIN — TETRAKIS(2-METHOXYISOBUTYLISOCYANIDE)COPPER(I) TETRAFLUOROBORATE 10.7 MILLICURIE: 1 INJECTION, POWDER, LYOPHILIZED, FOR SOLUTION INTRAVENOUS at 09:26

## 2024-01-12 RX ADMIN — LOSARTAN POTASSIUM 50 MG: 50 TABLET, FILM COATED ORAL at 18:27

## 2024-01-12 RX ADMIN — METOPROLOL SUCCINATE 200 MG: 100 TABLET, EXTENDED RELEASE ORAL at 18:27

## 2024-01-12 RX ADMIN — HYDROCHLOROTHIAZIDE 25 MG: 25 TABLET ORAL at 18:27

## 2024-01-12 RX ADMIN — SODIUM CHLORIDE, PRESERVATIVE FREE 10 ML: 5 INJECTION INTRAVENOUS at 09:37

## 2024-01-12 RX ADMIN — ENOXAPARIN SODIUM 40 MG: 100 INJECTION SUBCUTANEOUS at 18:31

## 2024-01-12 RX ADMIN — REGADENOSON 0.4 MG: 0.08 INJECTION, SOLUTION INTRAVENOUS at 11:43

## 2024-01-12 RX ADMIN — AZITHROMYCIN MONOHYDRATE 500 MG: 500 INJECTION, POWDER, LYOPHILIZED, FOR SOLUTION INTRAVENOUS at 22:32

## 2024-01-12 RX ADMIN — ASPIRIN 81 MG: 81 TABLET ORAL at 09:37

## 2024-01-12 RX ADMIN — CEFTRIAXONE 1000 MG: 1 INJECTION, POWDER, FOR SOLUTION INTRAMUSCULAR; INTRAVENOUS at 22:32

## 2024-01-12 RX ADMIN — CLOPIDOGREL BISULFATE 75 MG: 75 TABLET ORAL at 18:27

## 2024-01-12 RX ADMIN — TETRAKIS(2-METHOXYISOBUTYLISOCYANIDE)COPPER(I) TETRAFLUOROBORATE 34.9 MILLICURIE: 1 INJECTION, POWDER, LYOPHILIZED, FOR SOLUTION INTRAVENOUS at 11:43

## 2024-01-12 RX ADMIN — ATORVASTATIN CALCIUM 80 MG: 20 TABLET, FILM COATED ORAL at 22:32

## 2024-01-12 RX ADMIN — MORPHINE SULFATE 1 MG: 2 INJECTION, SOLUTION INTRAMUSCULAR; INTRAVENOUS at 02:08

## 2024-01-12 ASSESSMENT — PAIN DESCRIPTION - ORIENTATION: ORIENTATION: LEFT

## 2024-01-12 ASSESSMENT — PAIN DESCRIPTION - LOCATION: LOCATION: CHEST

## 2024-01-12 ASSESSMENT — PAIN SCALES - GENERAL: PAINLEVEL_OUTOF10: 4

## 2024-01-12 ASSESSMENT — PAIN DESCRIPTION - DESCRIPTORS: DESCRIPTORS: DULL

## 2024-01-12 NOTE — PROGRESS NOTES
Hospitalist Progress Note-critical care note     Patient: Juan Suárez MRN: [id number]  CSN: [id number]    YOB: 1960  Age: 63 y.o.  Sex: male    DOA: 1/11/2024 LOS:  LOS: 1 day            Chief complaint: Chest pain, lung infiltrate, diabetes, hypertension    Assessment/Plan         Active Hospital Problems    Diagnosis Date Noted    Chest pain [R07.9] 01/11/2024    Lung infiltrate [R91.8] 01/11/2024    High cholesterol [E78.00]     S/P CABG x 3 [Z95.1] 01/16/2023    Type 2 diabetes mellitus without complication, without long-term current use of insulin (HCC) [E11.9] 02/28/2014    Essential hypertension, benign [I10] 02/28/2014         Chest pain, cbge   Ce negative still report chest discomfort   Cardiac monitor, ce trend need to r/o acs   Ekg: no st change at this point, stress test today   morphine /nitro prn for chest pain  Cardiology consult   Continue aspirin and lipitor, Plavix metoprolol   Heart rate better  Echo         Right lower lobe mild acute infiltrate.   Continue abx for possible pna         DM type II , with complication,  ssi, diabetic diet , hypoglycemia protocol        HTN, accelerated    Subjective still have on and off chest pressure    TIME: E/M Time spent with patient and patient care issues: [] 31-40 mins  [x] 41-49 mins  [] 50 mins or more.      Disposition :tbd,   Review of systems:    General: No fevers or chills.  Cardiovascular: No chest pain, + pressure. No palpitations.   Pulmonary: No shortness of breath.   Gastrointestinal: No nausea, vomiting.     Vital signs/Intake and Output:  Visit Vitals  /88   Pulse 65   Temp 98.1 °F (36.7 °C) (Oral)   Resp 16   Ht 1.854 m (6' 1\")   Wt 88.5 kg (195 lb)   SpO2 97%   BMI 25.73 kg/m²     Current Shift:  01/12 0701 - 01/12 1900  In: 240 [P.O.:240]  Out: -   Last three shifts:  No intake/output data recorded.        Physical Exam:  General: WD, WN.  Alert, cooperative, no acute distress    HEENT: NC, Atraumatic.  BENJA

## 2024-01-12 NOTE — PLAN OF CARE
Problem: Discharge Planning  Goal: Discharge to home or other facility with appropriate resources  Outcome: Progressing  Flowsheets (Taken 1/11/2024 2015)  Discharge to home or other facility with appropriate resources:   Identify barriers to discharge with patient and caregiver   Arrange for needed discharge resources and transportation as appropriate   Identify discharge learning needs (meds, wound care, etc)

## 2024-01-12 NOTE — CARE COORDINATION
Case Management Assessment  Initial Evaluation    Date/Time of Evaluation: 1/12/2024 3:14 PM  Assessment Completed by: Sea Rodriguez RN    If patient is discharged prior to next notation, then this note serves as note for discharge by case management.    Patient Name: Juan Suárez                   YOB: 1960  Diagnosis: Chest pain [R07.9]  S/P CABG x 3 [Z95.1]  Chest pain, unspecified type [R07.9]                   Date / Time: 1/11/2024  2:24 PM    Patient Admission Status: Inpatient   Readmission Risk (Low < 19, Mod (19-27), High > 27): Readmission Risk Score: 7    Current PCP: Jonah Lake MD  PCP verified by CM? Yes    Chart Reviewed: Yes      History Provided by: Patient  Patient Orientation: Alert and Oriented    Patient Cognition: Alert    Hospitalization in the last 30 days (Readmission):  No    If yes, Readmission Assessment in CM Navigator will be completed.    Advance Directives:      Code Status: Full Code   Patient's Primary Decision Maker is:        Discharge Planning:    Patient lives with: Spouse/Significant Other Type of Home: House  Primary Care Giver: Self  Patient Support Systems include: Spouse/Significant Other   Current Financial resources: [place]  Current community resources: None  Current services prior to admission: None            Current DME:              Type of Home Care services:  None    ADLS  Prior functional level: Independent in ADLs/IADLs  Current functional level: Independent in ADLs/IADLs    PT AM-PAC:   /24  OT AM-PAC:   /24    Family can provide assistance at DC: Yes  Would you like Case Management to discuss the discharge plan with any other family members/significant others, and if so, who?    Plans to Return to Present Housing: Yes  Other Identified Issues/Barriers to RETURNING to current housing: None  Potential Assistance needed at discharge: N/A            Potential DME:    Patient expects to discharge to: House  Plan for transportation

## 2024-01-13 VITALS
HEIGHT: 73 IN | SYSTOLIC BLOOD PRESSURE: 121 MMHG | BODY MASS INDEX: 25.84 KG/M2 | DIASTOLIC BLOOD PRESSURE: 72 MMHG | WEIGHT: 195 LBS | OXYGEN SATURATION: 99 % | RESPIRATION RATE: 17 BRPM | HEART RATE: 63 BPM | TEMPERATURE: 98.2 F

## 2024-01-13 PROBLEM — E11.8 TYPE II DIABETES MELLITUS WITH COMPLICATION (HCC): Status: ACTIVE | Noted: 2024-01-13

## 2024-01-13 PROBLEM — I25.700 CORONARY ARTERY DISEASE INVOLVING CORONARY BYPASS GRAFT WITH UNSTABLE ANGINA PECTORIS (HCC): Status: ACTIVE | Noted: 2024-01-13

## 2024-01-13 LAB
ANION GAP SERPL CALC-SCNC: 6 MMOL/L (ref 3–18)
BASOPHILS # BLD: 0 K/UL (ref 0–0.1)
BASOPHILS NFR BLD: 0 % (ref 0–2)
BUN SERPL-MCNC: 10 MG/DL (ref 7–18)
BUN/CREAT SERPL: 12 (ref 12–20)
CALCIUM SERPL-MCNC: 9 MG/DL (ref 8.5–10.1)
CHLORIDE SERPL-SCNC: 106 MMOL/L (ref 100–111)
CO2 SERPL-SCNC: 29 MMOL/L (ref 21–32)
CREAT SERPL-MCNC: 0.81 MG/DL (ref 0.6–1.3)
DIFFERENTIAL METHOD BLD: ABNORMAL
EOSINOPHIL # BLD: 0.1 K/UL (ref 0–0.4)
EOSINOPHIL NFR BLD: 2 % (ref 0–5)
ERYTHROCYTE [DISTWIDTH] IN BLOOD BY AUTOMATED COUNT: 13.4 % (ref 11.6–14.5)
GLUCOSE BLD STRIP.AUTO-MCNC: 105 MG/DL (ref 70–110)
GLUCOSE SERPL-MCNC: 104 MG/DL (ref 74–99)
HCT VFR BLD AUTO: 37.6 % (ref 36–48)
HGB BLD-MCNC: 13.5 G/DL (ref 13–16)
IMM GRANULOCYTES # BLD AUTO: 0 K/UL (ref 0–0.04)
IMM GRANULOCYTES NFR BLD AUTO: 0 % (ref 0–0.5)
LYMPHOCYTES # BLD: 3.1 K/UL (ref 0.9–3.6)
LYMPHOCYTES NFR BLD: 46 % (ref 21–52)
MCH RBC QN AUTO: 31 PG (ref 24–34)
MCHC RBC AUTO-ENTMCNC: 35.9 G/DL (ref 31–37)
MCV RBC AUTO: 86.2 FL (ref 78–100)
MONOCYTES # BLD: 0.6 K/UL (ref 0.05–1.2)
MONOCYTES NFR BLD: 9 % (ref 3–10)
NEUTS SEG # BLD: 2.8 K/UL (ref 1.8–8)
NEUTS SEG NFR BLD: 42 % (ref 40–73)
NRBC # BLD: 0 K/UL (ref 0–0.01)
NRBC BLD-RTO: 0 PER 100 WBC
PLATELET # BLD AUTO: 241 K/UL (ref 135–420)
PMV BLD AUTO: 9.1 FL (ref 9.2–11.8)
POTASSIUM SERPL-SCNC: 3.8 MMOL/L (ref 3.5–5.5)
RBC # BLD AUTO: 4.36 M/UL (ref 4.35–5.65)
SODIUM SERPL-SCNC: 141 MMOL/L (ref 136–145)
TROPONIN I SERPL HS-MCNC: 14 NG/L (ref 0–78)
WBC # BLD AUTO: 6.7 K/UL (ref 4.6–13.2)

## 2024-01-13 PROCEDURE — 6370000000 HC RX 637 (ALT 250 FOR IP): Performed by: HOSPITALIST

## 2024-01-13 PROCEDURE — 84484 ASSAY OF TROPONIN QUANT: CPT

## 2024-01-13 PROCEDURE — 82962 GLUCOSE BLOOD TEST: CPT

## 2024-01-13 PROCEDURE — 36415 COLL VENOUS BLD VENIPUNCTURE: CPT

## 2024-01-13 PROCEDURE — 85025 COMPLETE CBC W/AUTO DIFF WBC: CPT

## 2024-01-13 PROCEDURE — 80048 BASIC METABOLIC PNL TOTAL CA: CPT

## 2024-01-13 RX ORDER — NITROGLYCERIN 0.4 MG/1
0.4 TABLET SUBLINGUAL EVERY 5 MIN PRN
Qty: 25 TABLET | Refills: 0 | Status: SHIPPED | OUTPATIENT
Start: 2024-01-13

## 2024-01-13 RX ORDER — DOXYCYCLINE HYCLATE 100 MG
100 TABLET ORAL 2 TIMES DAILY
Qty: 10 TABLET | Refills: 0 | Status: SHIPPED | OUTPATIENT
Start: 2024-01-13 | End: 2024-01-18

## 2024-01-13 RX ADMIN — LOSARTAN POTASSIUM 50 MG: 50 TABLET, FILM COATED ORAL at 08:48

## 2024-01-13 RX ADMIN — HYDROCHLOROTHIAZIDE 25 MG: 25 TABLET ORAL at 08:48

## 2024-01-13 RX ADMIN — ASPIRIN 81 MG: 81 TABLET ORAL at 08:48

## 2024-01-13 RX ADMIN — METOPROLOL SUCCINATE 200 MG: 100 TABLET, EXTENDED RELEASE ORAL at 08:48

## 2024-01-13 RX ADMIN — CLOPIDOGREL BISULFATE 75 MG: 75 TABLET ORAL at 08:49

## 2024-01-13 NOTE — PROGRESS NOTES
Discharge instructions reviewed with patient. He verbalized understanding of all discharge instructions and education. Medications to be picked up at pt's preferred pharmacy. Pt awaiting wife's arrival for transportation

## 2024-01-13 NOTE — PROGRESS NOTES
Cardiology Progress Note        Patient: Juan Suárez        Sex: male          DOA: 1/11/2024  YOB: 1960      Age:  63 y.o.        LOS:  LOS: 1 day     Patient seen and examined, chart reviewed.    Assessment/Plan     Patient Active Problem List   Diagnosis    Hyperosmolar (nonketotic) coma (HCC)    Essential hypertension, benign    Hypokalemia    STEMI (ST elevation myocardial infarction) ([facility])    S/P CABG x 3    High cholesterol    Type 2 diabetes mellitus without complication, without long-term current use of insulin (HCC)    Alcohol use, unspecified with other alcohol-induced disorder (HCC)    Chronic low back pain    Diverticulosis of colon    Gastroesophageal reflux disease    Glaucoma suspect    Peripheral neuropathy    Plantar fascial fibromatosis of left foot    Vitamin D deficiency    Other idiopathic peripheral autonomic neuropathy    Male erectile dysfunction, unspecified    Age-related nuclear cataract, left eye    Adverse effect of angiotensin-converting enzyme inhibitor    Chest pain    Lung infiltrate      CAD  Precordial chest pain     Patient had STEMI in January 2023 catheterization report  Acute inferior STEMI     100% occluded proximal/mid very calcified large RCA.  Baseline DAPHNE 0 flow post angioplasty DAPHNE-3 flow.  Due to severe calcification stent is unable to cross the lesion.      Moderate-sized ramus intermedius have a 80% calcified ostial/proximal disease.     Large sized mid/distal LAD have 80% disease.     Large left main artery have ostial 40% to 50% disease in cranial views with nonischemic IFR of 0.96.  LVEDP 16 mmHg.     Recommendation.  Multivessel CABG.  Discussed with patient and wife both agreed.    Echocardiogram was done in 01/2023 reported      Left Ventricle: Normal left ventricular systolic function with a visually estimated EF of 50 - 55%. Left ventricle size is normal. Normal wall thickness. See diagram for wall motion

## 2024-01-13 NOTE — DISCHARGE SUMMARY
Discharge Summary    Patient: Juan Suárez MRN: [id number]  CSN: [id number]    YOB: 1960  Age: 63 y.o.  Sex: male    DOA: 1/11/2024 LOS:  LOS: 2 days   Discharge Date:      Primary Care Provider:  Jonah Lake MD    Admission Diagnoses: Chest pain [R07.9]  S/P CABG x 3 [Z95.1]  Chest pain, unspecified type [R07.9]    Discharge Diagnoses:    Active Hospital Problems    Diagnosis     Coronary artery disease involving coronary bypass graft with unstable angina pectoris (HCC) [I25.700]     Type II diabetes mellitus with complication (HCC) [E11.8]     Chest pain [R07.9]     Lung infiltrate [R91.8]     Peripheral neuropathy [G62.9]     High cholesterol [E78.00]     S/P CABG x 3 [Z95.1]     Essential hypertension, benign [I10]          Discharge Medications:        Medication List        START taking these medications      doxycycline hyclate 100 MG tablet  Commonly known as: VIBRA-TABS  Take 1 tablet by mouth 2 times daily for 5 days     nitroGLYCERIN 0.4 MG SL tablet  Commonly known as: NITROSTAT  Place 1 tablet under the tongue every 5 minutes as needed for Chest pain up to max of 3 total doses. If no relief after 1 dose, call 911.            CONTINUE taking these medications      aspirin 81 MG EC tablet     atorvastatin 80 MG tablet  Commonly known as: LIPITOR     clopidogrel 75 MG tablet  Commonly known as: PLAVIX     hydroCHLOROthiazide 25 MG tablet  Commonly known as: HYDRODIURIL     losartan 25 MG tablet  Commonly known as: COZAAR  Take 2 tablets by mouth daily     metFORMIN 500 MG tablet  Commonly known as: GLUCOPHAGE     metoprolol succinate 100 MG extended release tablet  Commonly known as: TOPROL XL     therapeutic multivitamin-minerals tablet     vitamin D 25 MCG (1000 UT) Tabs tablet  Commonly known as: CHOLECALCIFEROL               Where to Get Your Medications        [id number] - [place], [place] - [address] - [address]  stress test is abnormal but negative for ischemia. 2) Baseline EKG revealed Sinus rhythm,  T inversion in inferior leads. There were  nonspecific ST changes after injection of Regadenoson  and in recovery. 3) There was severe fixed perfusion defect of medium size in basal and mid inferior segments 4) Calculated LVEF 61%. TID 1.05 5) Cannot comment on exercise capacity due to pharmacological study. 6) No previous study to compare.     Echo (TTE) complete (PRN contrast/bubble/strain/3D)    Result Date: 1/12/2024    Left Ventricle: Normal left ventricular systolic function with a visually estimated EF of 55 - 60%. Left ventricle size is normal. Normal wall thickness. The following segments are hypokinetic: basal inferior, mid inferior and apical inferior. All other segments are normal.. Grade I diastolic dysfunction present with normal LV EF.   Tricuspid Valve: Unable to assess RVSP due to inadequate or insignificant tricuspid regurgitation.   Image quality is adequate.     XR CHEST PORTABLE    Result Date: 1/11/2024  EXAM:  XR CHEST PORTABLE INDICATION: Chest pain COMPARISON: 1/27/2023 TECHNIQUE: portable chest AP view FINDINGS: The cardiac silhouette is within normal limits status post median sternotomy. The pulmonary vasculature is within normal limits. Lung volumes are moderate with minimal acute infiltrate in the right lung base. The visualized bones and upper abdomen are age-appropriate.     Right lower lobe mild acute infiltrate.           No results found for this or any previous visit.           CC: Jonah Lake MD

## (undated) DEVICE — STENT ONYXNG40015UX ONYX 4.00X15RX
Type: IMPLANTABLE DEVICE | Status: NON-FUNCTIONAL
Brand: ONYX FRONTIER™

## (undated) DEVICE — TORCON NB, ADVANTAGE CATHETER: Brand: TORCON NB

## (undated) DEVICE — NEEDLE HYPO 25GA L0.625IN BLU POLYPR HUB S STL REG BVL STR

## (undated) DEVICE — SUTURE ABSORBABLE BRAIDED 2-0 CT-1 27 IN UD VICRYL J259H

## (undated) DEVICE — STERILE POLYISOPRENE POWDER-FREE SURGICAL GLOVES: Brand: PROTEXIS

## (undated) DEVICE — Device: Brand: RETRACT-O-TAPE 18G X 30.5CM BLUNT NEEDLE

## (undated) DEVICE — SUTURE NRLN SZ 0 L18IN NONABSORBABLE BLK L36MM CT-1 1/2 CIR C521D

## (undated) DEVICE — CATH DIAG FR4 EXPO 5FRX100CM -- EXPO

## (undated) DEVICE — SPLINT WR POS F/ARTERIAL ACC -- BX/10

## (undated) DEVICE — PRESSURE MONITORING SET: Brand: TRUWAVE

## (undated) DEVICE — SUT ETHBND 3-0 30IN RB1 DA GRN --

## (undated) DEVICE — CATH BLLN DIL 2.0X12MM RX -- EUPHORA

## (undated) DEVICE — PREP SKN CHLRAPRP APL 26ML STR --

## (undated) DEVICE — RUNTHROUGH NS EXTRA FLOPPY PTCA GUIDEWIRE: Brand: RUNTHROUGH

## (undated) DEVICE — CATH GUID COR JR4.0 6FR 100CM -- LAUNCHER

## (undated) DEVICE — Device

## (undated) DEVICE — AVID DUAL STAGE VENOUS DRAINAGE CANNULA: Brand: AVID DUAL STAGE VENOUS DRAINAGE CANNULA

## (undated) DEVICE — CATH GUID COR EB40 6FR 100CM -- LAUNCHER

## (undated) DEVICE — SHUNT CV L14MM DIA1.25MM IC BLB CLRVW
Type: IMPLANTABLE DEVICE | Site: CORONARY | Status: NON-FUNCTIONAL
Removed: 2023-01-16

## (undated) DEVICE — CATHETER GUID 6FR L150CM RAP EXCHG L25CM TIP 5.1FR PUSHROD

## (undated) DEVICE — CONN PERF Y 0.5X3/8X3/8IN --

## (undated) DEVICE — CATH KT RAD ART 20GX1.75IN --

## (undated) DEVICE — Y BARBED CONNECTOR POLYPROPYLENE, LARGE: Brand: ARGYLE

## (undated) DEVICE — CATH GUID COR AL75 6FR 100CM -- LAUNCHER

## (undated) DEVICE — HI-TORQUE WHISPER ES GUIDE WIRE .014 STRAIGHTTIP 3.0 CM X 190 CM: Brand: HI-TORQUE WHISPER

## (undated) DEVICE — CATH ANGI BLLN DIL 4.0X12MM -- NC EUPHORA

## (undated) DEVICE — AGENT HEMSTAT W4XL8IN OXIDIZED REGENERATED CELOS ABSRB

## (undated) DEVICE — SENSOR PLSE OXMTR AD CBL L36IN ADH FRM FIT SPO2 DISP

## (undated) DEVICE — EZ GLIDE AORTIC CANNULA: Brand: EDWARDS LIFESCIENCES EZ GLIDE AORTIC CANNULA

## (undated) DEVICE — CLIP LIG M BLU TI HRT SHP WIRE HORZ 600 PER BX

## (undated) DEVICE — SHIELD RAD 14X16 IN W/ SCOOP ABSORBER

## (undated) DEVICE — GLOVE SURG SZ 8 L11.77IN FNGR THK9.8MIL STRW LTX POLYMER

## (undated) DEVICE — SUT SLK 2-0SH 30IN BLK --

## (undated) DEVICE — BRUSH SCRB 4% CHG RED DISP --

## (undated) DEVICE — X-RAY SPONGES,12 PLY: Brand: DERMACEA

## (undated) DEVICE — SUTURE ETHBND EXCEL SZ 0 L30IN NONABSORBABLE GRN CT1 L36MM X424H

## (undated) DEVICE — GAUZE,SPONGE,4"X4",16PLY,STRL,LF,10/TRAY: Brand: MEDLINE

## (undated) DEVICE — BLOWER FLUID GAS MIX L165CM S STL SHFT MAL AND HNDPC

## (undated) DEVICE — SWAN-GANZ CCOMBO V THERMODILUTION CATHETER: Brand: SWAN-GANZ CCOMBO V

## (undated) DEVICE — ROTATING SURGICAL PUNCHES, 1 PER POUCH: Brand: A&E MEDICAL / ROTATING SURGICAL PUNCHES

## (undated) DEVICE — GDWIRE ANGIO DUO FLX 0.018X25 --

## (undated) DEVICE — CANNULA PERF L2IN BLNT TIP 2MM VES CLR RADPQ BODY FEM LUER

## (undated) DEVICE — FOGARTY SPRING CLIPS 6MM: Brand: FOGARTY SOFTJAW

## (undated) DEVICE — DECANTER BAG 9": Brand: MEDLINE INDUSTRIES, INC.

## (undated) DEVICE — SUMP INTCARD W/ 1/4INCH CONN 20FRENCH 15INCH DLP

## (undated) DEVICE — PROBE VASC 8MHZ WTRPRF

## (undated) DEVICE — HEART II: Brand: MEDLINE INDUSTRIES, INC.

## (undated) DEVICE — PACK PROCEDURE SURG CATH CUST

## (undated) DEVICE — CATHETER L HRT VENT SIL 16 IN OVERALL LEN 20FR N VENT CONN

## (undated) DEVICE — STABILIZER SURG TISS W/ CANSTR TBNG EVOLUTION OCTPS

## (undated) DEVICE — LINER,SOFT,SUCTION CANISTER,1500CC: Brand: MEDLINE

## (undated) DEVICE — REM POLYHESIVE ADULT PATIENT RETURN ELECTRODE: Brand: VALLEYLAB

## (undated) DEVICE — CANNULA PERF 22FR L305CM W 3 8IN ART BLNT TIP HI FLOW VENT

## (undated) DEVICE — SUTURE NONABSORBABLE MONOFILAMENT 4-0 RB-1 36 IN BLU PROLENE 8557H

## (undated) DEVICE — CATHETER ANGIO JL3.5 0.056 INX6 FRX100 CM THRULUMEN EXPO

## (undated) DEVICE — KIT VASCULAR TOURNIQUET 5IN

## (undated) DEVICE — SUTURE VCRL SZ 0 L36IN ABSRB UD L36MM CT-1 1/2 CIR J946H

## (undated) DEVICE — SUTURE MCRYL SZ 4 0 L18IN ABSRB VLT PS 1 L24MM 3 8 CIR REV Y682H

## (undated) DEVICE — ELECTRODE BLDE L4IN NONINSULATED EDGE

## (undated) DEVICE — GOWN,SIRUS,NONRNF,SETINSLV,XL,20/CS: Brand: MEDLINE

## (undated) DEVICE — STOPCOCK TRNSDUC 500PSI 3 W ROT M LUER LT BLU OFF HNDL R

## (undated) DEVICE — SYR LR LCK 1ML GRAD NSAF 30ML --

## (undated) DEVICE — GLIDESHEATH SLENDER STAINLESS STEEL KIT: Brand: GLIDESHEATH SLENDER

## (undated) DEVICE — MEDI-VAC TUBING CONNECTOR 5-IN-1 STRAIGHT: Brand: CARDINAL HEALTH

## (undated) DEVICE — PROCEDURE KIT 30 ATM 20 CC BLEEDBK CTRL COPILOT 20/30

## (undated) DEVICE — PAD,ABDOMINAL,8"X10",ST,LF: Brand: MEDLINE

## (undated) DEVICE — IMPLANTABLE DEVICE: Type: IMPLANTABLE DEVICE | Status: NON-FUNCTIONAL

## (undated) DEVICE — ADAPTER CARDPLG SET MGMT FEM LUER W/ CLR CODE CLMP DLP

## (undated) DEVICE — NDL PRT INJ NSAF BLNT 18GX1.5 --

## (undated) DEVICE — PENCIL ES L3M BTTN SWCH S STL HEX LOK BLDE ELECTRD HOLSTER

## (undated) DEVICE — DRAPE,ANGIO,BRACH,STERILE,38X44: Brand: MEDLINE

## (undated) DEVICE — TUBE SUCT 20FR RIG MACRO SUC

## (undated) DEVICE — CONNECTOR TBNG W0.25XL0.375IN REDUC PLAS VLV BARB

## (undated) DEVICE — CATHETER ART 20 GAX4 CM 22 GA RADIAL FEP

## (undated) DEVICE — GUIDEWIRE VASC L260CM DIA0.035IN RAD 3MM J TIP L7CM PTFE

## (undated) DEVICE — SOL IRR SOD CL 0.9% 1000ML BTL --

## (undated) DEVICE — GOWN,AURORA,FABRIC-REINFORCED,LARGE: Brand: MEDLINE

## (undated) DEVICE — CATH ANGI BLLN DIL 3.5X12MM -- NC EUPHORA

## (undated) DEVICE — SUTURE PROL SZ 7-0 L24IN NONABSORBABLE BLU L8MM BV175-6 3/8 8735H

## (undated) DEVICE — SUTURE PROL SZ 4-0 L30IN NONABSORBABLE BLU SH-1 L22MM 1/2 8526H

## (undated) DEVICE — BARD® PTFE FELT PLEDGETS, (RECTANGLE), 4.8 MM X 6 MM: Brand: BARD® PTFE FELT PLEDGETS

## (undated) DEVICE — DRAIN SURG 24FR L5/16IN DIA8MM SIL RND HUBLESS FULL FLUT

## (undated) DEVICE — GLOVE SURG SZ 7 L11.33IN FNGR THK9.8MIL STRW LTX POLYMER

## (undated) DEVICE — TOWEL SURG W16XL26IN WHT NONFENESTRATED ST 4 PER PK

## (undated) DEVICE — Device: Brand: OMNIWIRE PRESSURE GUIDE WIRE

## (undated) DEVICE — SYS VSL HARV HEMOPRO2 VASOVIEW -- HARV SYS MINIMUM ORDER 5/EA

## (undated) DEVICE — INSERT SUT HLD F/OCTBS RETRCTR --

## (undated) DEVICE — OPTIFOAM GENTLE LIQUITRAP, SACRUM, 9X9: Brand: MEDLINE

## (undated) DEVICE — INSULATED BLADE ELECTRODE: Brand: EDGE

## (undated) DEVICE — NC TREK CORONARY DILATATION CATHETER 3.5 MM X 15 MM / RAPID-EXCHANGE: Brand: NC TREK

## (undated) DEVICE — SPONGE DRAIN NONWOVEN 4X4IN -- 2/PK

## (undated) DEVICE — SET TRNQT STD 12FR TB LEN 7 IN 2 RED 2 BLU 2 CLR 16FR 2 L

## (undated) DEVICE — ARGYLE FRAZIER SURGICAL SUCTION INSTRUMENT 8 FR/CH (2.7 MM): Brand: ARGYLE

## (undated) DEVICE — TRAY CATHETER 16FR W URIN M STATLOK STBL DEV FOR LUBRI-SIL 2 TEMP

## (undated) DEVICE — TUBING PRSS MON L24IN PVC RIG NONEXPANDING M TO FEM CONN

## (undated) DEVICE — PROCEDURE KIT FLUID MGMT 10 FR CUST MAINFOLD

## (undated) DEVICE — 22G X 7"(178MM)PLASTIC HUBWITH WINGS, CALIBRATIONS: Brand: TUOHY EPIDURAL NEEDLE

## (undated) DEVICE — STANDARD SURGICAL GOWN, L: Brand: CONVERTORS

## (undated) DEVICE — SUTURE NONABSORBABLE MONOFILAMENT 6-0 C-1 1X30 IN PROLENE 8706H

## (undated) DEVICE — STANDARD HYPODERMIC NEEDLE,POLYPROPYLENE HUB: Brand: MONOJECT